# Patient Record
Sex: MALE | Race: BLACK OR AFRICAN AMERICAN | HISPANIC OR LATINO | Employment: OTHER | URBAN - METROPOLITAN AREA
[De-identification: names, ages, dates, MRNs, and addresses within clinical notes are randomized per-mention and may not be internally consistent; named-entity substitution may affect disease eponyms.]

---

## 2017-07-12 ENCOUNTER — HOSPITAL ENCOUNTER (EMERGENCY)
Facility: HOSPITAL | Age: 32
Discharge: HOME | End: 2017-07-12
Admitting: EMERGENCY MEDICINE

## 2017-07-12 ENCOUNTER — APPOINTMENT (EMERGENCY)
Dept: RADIOLOGY | Facility: HOSPITAL | Age: 32
End: 2017-07-12

## 2017-07-12 VITALS
TEMPERATURE: 99 F | OXYGEN SATURATION: 97 % | RESPIRATION RATE: 16 BRPM | SYSTOLIC BLOOD PRESSURE: 121 MMHG | HEART RATE: 85 BPM | DIASTOLIC BLOOD PRESSURE: 60 MMHG

## 2017-07-12 DIAGNOSIS — S89.91XA RIGHT KNEE INJURY, INITIAL ENCOUNTER: Primary | ICD-10-CM

## 2017-07-12 PROCEDURE — 99283 EMERGENCY DEPT VISIT LOW MDM: CPT

## 2017-07-12 PROCEDURE — 73564 X-RAY EXAM KNEE 4 OR MORE: CPT

## 2017-07-12 RX ORDER — IBUPROFEN 600 MG/1
600 TABLET ORAL ONCE
Status: COMPLETED | OUTPATIENT
Start: 2017-07-12 | End: 2017-07-12

## 2017-07-12 RX ADMIN — IBUPROFEN 600 MG: 600 TABLET, FILM COATED ORAL at 20:53

## 2018-04-06 ENCOUNTER — HOSPITAL ENCOUNTER (EMERGENCY)
Facility: HOSPITAL | Age: 33
Discharge: HOME | End: 2018-04-06
Attending: EMERGENCY MEDICINE

## 2018-04-06 VITALS
RESPIRATION RATE: 15 BRPM | OXYGEN SATURATION: 95 % | DIASTOLIC BLOOD PRESSURE: 55 MMHG | WEIGHT: 150 LBS | TEMPERATURE: 98 F | HEART RATE: 56 BPM | SYSTOLIC BLOOD PRESSURE: 105 MMHG

## 2018-04-06 DIAGNOSIS — T78.40XA ALLERGIC REACTION, INITIAL ENCOUNTER: Primary | ICD-10-CM

## 2018-04-06 PROCEDURE — 96372 THER/PROPH/DIAG INJ SC/IM: CPT

## 2018-04-06 PROCEDURE — 96374 THER/PROPH/DIAG INJ IV PUSH: CPT

## 2018-04-06 PROCEDURE — 96375 TX/PRO/DX INJ NEW DRUG ADDON: CPT

## 2018-04-06 PROCEDURE — 99283 EMERGENCY DEPT VISIT LOW MDM: CPT

## 2018-04-06 RX ORDER — EPINEPHRINE 1 MG/ML
0.3 INJECTION, SOLUTION, CONCENTRATE INTRAVENOUS ONCE
Status: COMPLETED | OUTPATIENT
Start: 2018-04-06 | End: 2018-04-06

## 2018-04-06 RX ORDER — DIPHENHYDRAMINE HYDROCHLORIDE 50 MG/ML
12.5 INJECTION INTRAMUSCULAR; INTRAVENOUS ONCE
Status: COMPLETED | OUTPATIENT
Start: 2018-04-06 | End: 2018-04-06

## 2018-04-06 RX ORDER — DEXAMETHASONE SODIUM PHOSPHATE 10 MG/ML
10 INJECTION, SOLUTION INTRAMUSCULAR; INTRAVENOUS ONCE
Status: COMPLETED | OUTPATIENT
Start: 2018-04-06 | End: 2018-04-06

## 2018-04-06 RX ORDER — EPINEPHRINE 0.3 MG/.3ML
0.3 INJECTION SUBCUTANEOUS ONCE
Qty: 0.6 ML | Refills: 0 | Status: SHIPPED | OUTPATIENT
Start: 2018-04-06 | End: 2018-09-06 | Stop reason: SDUPTHER

## 2018-04-06 RX ADMIN — EPINEPHRINE 0.3 MG: 1 INJECTION, SOLUTION, CONCENTRATE INTRAVENOUS at 01:35

## 2018-04-06 RX ADMIN — DEXAMETHASONE SODIUM PHOSPHATE 10 MG: 10 INJECTION, SOLUTION INTRAMUSCULAR; INTRAVENOUS at 01:33

## 2018-04-06 RX ADMIN — FAMOTIDINE 20 MG: 10 INJECTION, SOLUTION INTRAVENOUS at 01:31

## 2018-04-06 RX ADMIN — DIPHENHYDRAMINE HYDROCHLORIDE 12.5 MG: 50 INJECTION, SOLUTION INTRAMUSCULAR; INTRAVENOUS at 01:32

## 2018-04-06 NOTE — DISCHARGE INSTRUCTIONS
Reacción alérgica general   LO QUE NECESITA SABER:   Kenna reacción alérgica es la respuesta de curry cuerpo a un alérgeno  Los alérgenos [employer], alimentos, picaduras de insectos, la caspa de los [place], el moho, el látex, los químicos y los ácaros del polvo  El polen de los [place], [place] césped y las hierbas malas también pueden causar kenna reacción alérgica  INSTRUCCIONES SOBRE EL NATE HOSPITALARIA:   Regrese a la charo de emergencias si:   · Usted tiene un sarpullido, urticaria, inflamación o comezón en la piel que empeora  · Usted tiene dificultad para respirar, falta de aire, sibilancia o tos  · Curry garganta se siente apretada o nenita labios o lengua se inflaman  · Usted tiene dificultad para tragar o hablar  · Usted tiene mareos, sensación de aturdimiento, desmayos o confusión  · Usted tiene náusea, vómito, diarrea o calambres abdominales  · Usted tiene dolor o presión en el pecho  Pregúntele a curry Michael Gambler vitaminas y minerales son adecuados para usted  · Usted tiene preguntas o inquietudes acerca de curry condición o cuidado  Medicamentos:   · Medicamentos,  para aliviar ciertos síntomas de las [place], elijah la comezón, los estornudos y la inflamación  Usted los puede maggy en forma de píldora o de gotas en nenita ojos o nariz  Los tratamientos tópicos pueden darse para aplicarse directamente en curry piel para ayudar a disminuir la comezón o la inflamación  · Cerritos nenita medicamentos elijah se le haya indicado  Consulte con curry médico si usted doug que curry medicamento no le está ayudando o si presenta efectos secundarios  Infórmele si es alérgico a algún medicamento  Mantenga kenna lista actualizada de los [place], las vitaminas y los productos herbales que axel  Incluya los siguientes datos de los medicamentos: cantidad, frecuencia y motivo de administración  Traiga con usted la lista o los envases de la píldoras a nenita citas de seguimiento   Lleve la lista de los medicamentos con usted en mary de kenna emergencia  Acuda a nenita consultas de control con jones médico según le indicaron  Anote nenita preguntas para que se acuerde de hacerlas milena nenita visitas  Cuidados personales:   · Evite el alérgeno  que usted doug haya causado jones reacción alérgica  · Use compresas frías  en la piel o los ojos si fueron afectados por la reacción alérgica  Es probable que las compresas frías ayuden a aliviar jones piel u ojos  · Enjuague nenita pasajes nasales  con kenna solución salina  El enjuague diario puede llegar a ayudar a mantener limpia jones nariz de alérgenos  · No fume  Los síntomas de nenita alergias pueden llegar a disminuir si usted no se expone al humo del cigarrillo  La nicotina y otros químicos de los cigarrillos y cigarros también pueden provocar daño en los pulmones  Pida información a jones médico si usted actualmente fuma y necesita ayuda para dejar de fumar  Los cigarrillos electrónicos o tabaco sin humo todavía contienen nicotina  Consulte con jones médico antes de [employer]  © 2017 [address] [employer] [employer] Information is for End User's use only and may not be sold, redistributed or otherwise used for commercial purposes  All illustrations and images included in CareNotes® are the copyrighted property of A D A M , Inc  or Danny Churchill  Esta información es sólo para uso en educación  Jones intención no es darle un consejo médico sobre enfermedades o tratamientos  Colsulte con jones Alben Bobby farmacéutico antes de seguir cualquier régimen médico para saber si es seguro y efectivo para usted

## 2018-04-06 NOTE — ED PROVIDER NOTES
History  Chief Complaint   Patient presents with    Allergic Reaction     patient reports diffuse hives and itchiness that started today after eating salad and octopus  denies reactions in the past   reports taking 50mg benadryl  denies tongue and throat swelling  reports difficutly breathing  HPI    None       History reviewed  No pertinent past medical history  History reviewed  No pertinent surgical history  History reviewed  No pertinent family history  I have reviewed and agree with the history as documented      Social History   Substance Use Topics    Smoking status: Current Every Day Smoker    Smokeless tobacco: Never Used    Alcohol use No        Review of Systems    Physical Exam  ED Triage Vitals   Temperature Pulse Respirations Blood Pressure SpO2   04/06/18 0051 04/06/18 0051 04/06/18 0051 04/06/18 0051 04/06/18 0051   98 °F (36 7 °C) 101 17 122/74 98 %      Temp Source Heart Rate Source Patient Position - Orthostatic VS BP Location FiO2 (%)   04/06/18 0051 04/06/18 0130 04/06/18 0051 04/06/18 0051 --   Oral Monitor Sitting Right arm       Pain Score       --                  Orthostatic Vital Signs  Vitals:    04/06/18 0051 04/06/18 0130   BP: 122/74 119/73   Pulse: 101 64   Patient Position - Orthostatic VS: Sitting Lying       Physical Exam    ED Medications  Medications   EPINEPHrine PF (ADRENALIN) 1 mg/mL injection 0 3 mg (0 3 mg Intramuscular Given 4/6/18 0135)   diphenhydrAMINE (BENADRYL) injection 12 5 mg (12 5 mg Intravenous Given 4/6/18 0132)   dexamethasone (PF) (DECADRON) injection 10 mg (10 mg Intravenous Given 4/6/18 0133)   famotidine (PEPCID) injection 20 mg (20 mg Intravenous Given 4/6/18 0131)       Diagnostic Studies  Results Reviewed     None                 No orders to display              Procedures  Procedures       Phone Contacts  ED Phone Contact    ED Course  ED Course                                [facility]  CritCare Time    Disposition  Final diagnoses:   None ED Disposition     None      Follow-up Information    None       Patient's Medications    No medications on file     No discharge procedures on file      ED Provider  Electronically Signed by

## 2018-05-08 NOTE — ED PROVIDER NOTES
History  Chief Complaint   Patient presents with    Allergic Reaction     patient reports diffuse hives and itchiness that started today after eating salad and octopus  denies reactions in the past   reports taking 50mg benadryl  denies tongue and throat swelling  reports difficutly breathing  History provided by:  Patient   used: No    Allergic Reaction   Presenting symptoms: difficulty breathing, itching, rash, swelling and wheezing    Presenting symptoms: no difficulty swallowing    Severity:  Moderate  Prior allergic episodes:  No prior episodes  Context: food    Relieved by:  None tried  Worsened by:  Nothing  Ineffective treatments:  None tried      None       History reviewed  No pertinent past medical history  History reviewed  No pertinent surgical history  History reviewed  No pertinent family history  I have reviewed and agree with the history as documented  Social History   Substance Use Topics    Smoking status: Current Every Day Smoker    Smokeless tobacco: Never Used    Alcohol use No        Review of Systems   Constitutional: Negative for activity change, appetite change, diaphoresis, fatigue and fever  HENT: Negative for congestion, rhinorrhea, sore throat and trouble swallowing  Eyes: Negative for pain and visual disturbance  Respiratory: Positive for shortness of breath and wheezing  Negative for cough and chest tightness  Cardiovascular: Negative for chest pain  Gastrointestinal: Negative for abdominal pain, blood in stool, diarrhea, nausea and vomiting  Endocrine: Negative for polyphagia and polyuria  Genitourinary: Negative for dysuria, flank pain, frequency, hematuria and urgency  Musculoskeletal: Negative for back pain, gait problem, neck pain and neck stiffness  Skin: Positive for itching and rash  Negative for color change  Allergic/Immunologic: Negative for immunocompromised state     Neurological: Negative for dizziness, speech difficulty, numbness and headaches  Hematological: Does not bruise/bleed easily  Psychiatric/Behavioral: Negative for confusion and decreased concentration  Physical Exam  ED Triage Vitals   Temperature Pulse Respirations Blood Pressure SpO2   04/06/18 0051 04/06/18 0051 04/06/18 0051 04/06/18 0051 04/06/18 0051   98 °F (36 7 °C) 101 17 122/74 98 %      Temp Source Heart Rate Source Patient Position - Orthostatic VS BP Location FiO2 (%)   04/06/18 0051 04/06/18 0130 04/06/18 0051 04/06/18 0051 --   Oral Monitor Sitting Right arm       Pain Score       --                  Orthostatic Vital Signs  Vitals:    04/06/18 0051 04/06/18 0130 04/06/18 0245   BP: 122/74 119/73 105/55   Pulse: 101 64 56   Patient Position - Orthostatic VS: Sitting Lying Lying       Physical Exam   Constitutional: He is oriented to person, place, and time  He appears well-developed and well-nourished  HENT:   Head: Normocephalic  Lips and tongue appear normal size  Uvula is midline and appears normal    Eyes: Conjunctivae and EOM are normal  Pupils are equal, round, and reactive to light  No scleral icterus  Neck: Normal range of motion  Neck supple  No JVD present  Cardiovascular: Normal rate and regular rhythm  Pulmonary/Chest: Effort normal  No respiratory distress  He has wheezes  Mild scattered wheezes, otherwise normal air movement, no tachypnea or respiratory distress at rest    Abdominal: Soft  Bowel sounds are normal  He exhibits no distension  There is no tenderness  There is no rebound and no guarding  Musculoskeletal: Normal range of motion  He exhibits no tenderness or deformity  Lymphadenopathy:     He has no cervical adenopathy  Neurological: He is alert and oriented to person, place, and time  Coordination normal    Skin: Skin is warm and dry  He is not diaphoretic  Diffuse erythematous, blanching, slightly raised urticarial rash of the chest and back     Psychiatric: He has a normal mood and affect  Vitals reviewed  ED Medications  Medications   EPINEPHrine PF (ADRENALIN) 1 mg/mL injection 0 3 mg (0 3 mg Intramuscular Given 4/6/18 0135)   diphenhydrAMINE (BENADRYL) injection 12 5 mg (12 5 mg Intravenous Given 4/6/18 0132)   dexamethasone (PF) (DECADRON) injection 10 mg (10 mg Intravenous Given 4/6/18 0133)   famotidine (PEPCID) injection 20 mg (20 mg Intravenous Given 4/6/18 0131)       Diagnostic Studies  Results Reviewed     None                 No orders to display              Procedures  Procedures       Phone Contacts  ED Phone Contact    ED Course                               MDM  Number of Diagnoses or Management Options  Allergic reaction, initial encounter: new and requires workup  Diagnosis management comments: 50-year-old, otherwise healthy male presented for evaluation after an apparent allergic reaction that occurred after eating a seafood salad including octopus  Patient states that he has had similar food in the past but that he has not eaten octopus in quite some time  Very shortly after a eating the patient began to developed an itching sensation throughout his body as well as a sensation of throat swelling and difficulty breathing  Patient does report similar reactions in the past although he thinks they were less severe in the past   He has not had a similar reaction in over a year  He does not know of any known food or medication allergies  Patient was noted to have a diffuse urticarial rash  He was not in acute respiratory distress at rest but did have bilateral wheezes on exam   He was treated as described and had excellent relief of his symptoms  He was re-evaluated prior to discharge and his rash was much improved  He had no more wheezing  He will be discharged to follow up with his primary care physician  We discussed the possible utility of allergy testing and future avoidance of triggers    We also discussed return precautions for anyNew or worsening symptoms  Amount and/or Complexity of Data Reviewed  Review and summarize past medical records: yes      CritCare Time    Disposition  Final diagnoses: Allergic reaction, initial encounter     Time reflects when diagnosis was documented in both MDM as applicable and the Disposition within this note     Time User Action Codes Description Comment    4/6/2018  2:31 AM Armando Richards Add [[id number] [id number]] Allergic reaction, initial encounter       ED Disposition     ED Disposition Condition Comment    Discharge  [address] discharge to home/self care  Condition at discharge: Good        Follow-up Information     Follow up With Specialties Details Why [address] [facility]  Please follow-up in 2 to 3 days  If you have new or worsening symptoms please call your doctor or return to the emergency department  [address] [place] [id number] [phone]        Discharge Medication List as of 4/6/2018  2:32 AM      START taking these medications    Details   EPINEPHrine (EPIPEN) 0 3 mg/0 3 mL SOAJ Inject 0 3 mL (0 3 mg total) into the shoulder, thigh, or buttocks once for 1 dose, Starting Fri 4/6/2018, Print           No discharge procedures on file      ED Provider  Electronically Signed by           Aziza Palmer MD  05/08/18 8291

## 2018-09-06 ENCOUNTER — OFFICE VISIT (OUTPATIENT)
Dept: FAMILY MEDICINE CLINIC | Facility: CLINIC | Age: 33
End: 2018-09-06
Payer: COMMERCIAL

## 2018-09-06 VITALS
DIASTOLIC BLOOD PRESSURE: 70 MMHG | HEART RATE: 80 BPM | RESPIRATION RATE: 16 BRPM | WEIGHT: 165 LBS | BODY MASS INDEX: 22.35 KG/M2 | SYSTOLIC BLOOD PRESSURE: 120 MMHG | OXYGEN SATURATION: 98 % | TEMPERATURE: 97.4 F | HEIGHT: 72 IN

## 2018-09-06 DIAGNOSIS — Z23 NEED FOR VACCINATION: ICD-10-CM

## 2018-09-06 DIAGNOSIS — Z91.013 ALLERGY TO SHELLFISH: ICD-10-CM

## 2018-09-06 DIAGNOSIS — T78.40XA ALLERGIC REACTION, INITIAL ENCOUNTER: ICD-10-CM

## 2018-09-06 DIAGNOSIS — Z00.00 PHYSICAL EXAM: Primary | ICD-10-CM

## 2018-09-06 DIAGNOSIS — M25.561 ACUTE PAIN OF RIGHT KNEE: ICD-10-CM

## 2018-09-06 PROCEDURE — 90715 TDAP VACCINE 7 YRS/> IM: CPT | Performed by: FAMILY MEDICINE

## 2018-09-06 PROCEDURE — 90732 PPSV23 VACC 2 YRS+ SUBQ/IM: CPT | Performed by: FAMILY MEDICINE

## 2018-09-06 PROCEDURE — 3008F BODY MASS INDEX DOCD: CPT | Performed by: FAMILY MEDICINE

## 2018-09-06 PROCEDURE — 99214 OFFICE O/P EST MOD 30 MIN: CPT | Performed by: FAMILY MEDICINE

## 2018-09-06 PROCEDURE — 90472 IMMUNIZATION ADMIN EACH ADD: CPT | Performed by: FAMILY MEDICINE

## 2018-09-06 PROCEDURE — 90471 IMMUNIZATION ADMIN: CPT | Performed by: FAMILY MEDICINE

## 2018-09-06 RX ORDER — PREDNISONE 20 MG/1
20 TABLET ORAL ONCE
Qty: 2 TABLET | Refills: 0 | Status: SHIPPED | OUTPATIENT
Start: 2018-09-06 | End: 2018-09-06

## 2018-09-06 RX ORDER — EPINEPHRINE 0.3 MG/.3ML
0.3 INJECTION SUBCUTANEOUS ONCE
Qty: 0.6 ML | Refills: 0 | Status: SHIPPED | OUTPATIENT
Start: 2018-09-06 | End: 2019-05-28

## 2018-09-06 RX ORDER — DIPHENHYDRAMINE HCL 25 MG
25 TABLET ORAL EVERY 6 HOURS PRN
Qty: 30 TABLET | Refills: 0 | Status: SHIPPED | OUTPATIENT
Start: 2018-09-06 | End: 2019-05-28

## 2018-09-06 RX ORDER — IBUPROFEN 600 MG/1
600 TABLET ORAL 2 TIMES DAILY WITH MEALS
Qty: 30 TABLET | Refills: 0 | Status: SHIPPED | OUTPATIENT
Start: 2018-09-06 | End: 2019-05-28

## 2018-09-06 RX ORDER — EPINEPHRINE 0.3 MG/.3ML
0.3 INJECTION SUBCUTANEOUS ONCE
Qty: 0.3 ML | Refills: 0 | Status: SHIPPED | OUTPATIENT
Start: 2018-09-06 | End: 2019-05-28

## 2018-09-06 NOTE — ASSESSMENT & PLAN NOTE
Follow up MRI to r/u meniscal tear  XR of 7/2018 r/u fx but was positive for effusion  PE positive for lateral meniscus tenderness  Motrin, 600 mg, BID, PRN for pain management  Follow up with results

## 2018-09-07 NOTE — PROGRESS NOTES
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 as subsequent hospitalization  Pt has been advised to keep one Epi-pen at home and in his car, along with 50 mg Benadryl and 40 mg Predinsone if pt has concern of possible exposure  Referred to Allergist for further workup  Diagnoses and all orders for this visit:    Physical exam  -     Cancel: Visual acuity screening    Need for vaccination  -     TDAP VACCINE GREATER THAN OR EQUAL TO 6YO IM  -     PNEUMOCOCCAL POLYSACCHARIDE VACCINE 23-VALENT =>1YO SQ IM    Acute pain of right knee  -     MRI knee right  wo contrast; Future  -     ibuprofen (MOTRIN) 600 mg tablet; Take 1 tablet (600 mg total) by mouth 2 (two) times a day with meals    Allergy to shellfish  -     Ambulatory referral to Allergy; Future  -     predniSONE 20 mg tablet; Take 1 tablet (20 mg total) by mouth once for 1 dose  -     diphenhydrAMINE (BENADRYL) 25 mg tablet; Take 1 tablet (25 mg total) by mouth every 6 (six) hours as needed for itching (EXPOSURE TO SHELLFISH)  -     EPINEPHrine (EPIPEN) 0 3 mg/0 3 mL SOAJ; Inject 0 3 mL (0 3 mg total) into a muscle once for 1 dose    Allergic reaction, initial encounter  -     EPINEPHrine (EPIPEN) 0 3 mg/0 3 mL SOAJ; Inject 0 3 mL (0 3 mg total) into a muscle once for 1 dose          Subjective:      Patient ID: Prashant Gamboa is a 28 y o  male  Prashant Gamboa is a 28 y o  Male, presenting to clinic to establish care and complete 's license physical  Pt has no major complaints and denies significant past medical history or current medication usage  Pt current complaint is persistent r  Knee pain, post injury sustained 7/2018 while pt was playing basket ball  Pt states that he landed on his feet and inverted his knee after a jump  Pt was seen in the ER, where an XR was completed and came back positive for joint effusion but negative for fx  Pt currently treats his knee with heat when there is pain  Pt states there is point tenderness on the lateral inferior aspect of the knee, and he experiences a sensation of his knee "giving out" when walking  He describes his pain as 3 to 4/10  The following portions of the patient's history were reviewed and updated as appropriate: He  has no past medical history on file  Patient Active Problem List    Diagnosis Date Noted    Acute pain of right knee 09/06/2018    Allergy to shellfish 09/06/2018     He  has no past surgical history on file  His family history is not on file  He  reports that he has been smoking  He has never used smokeless tobacco  He reports that he drinks alcohol  He reports that he uses drugs, including Marijuana, about 3 times per week  Current Outpatient Prescriptions   Medication Sig Dispense Refill    diphenhydrAMINE (BENADRYL) 25 mg tablet Take 1 tablet (25 mg total) by mouth every 6 (six) hours as needed for itching (EXPOSURE TO SHELLFISH) 30 tablet 0    EPINEPHrine (EPIPEN) 0 3 mg/0 3 mL SOAJ Inject 0 3 mL (0 3 mg total) into a muscle once for 1 dose 0 3 mL 0    EPINEPHrine (EPIPEN) 0 3 mg/0 3 mL SOAJ Inject 0 3 mL (0 3 mg total) into a muscle once for 1 dose 0 6 mL 0    ibuprofen (MOTRIN) 600 mg tablet Take 1 tablet (600 mg total) by mouth 2 (two) times a day with meals 30 tablet 0    predniSONE 20 mg tablet Take 1 tablet (20 mg total) by mouth once for 1 dose 2 tablet 0     No current facility-administered medications for this visit        Current Outpatient Prescriptions on File Prior to Visit   Medication Sig    [DISCONTINUED] EPINEPHrine (EPIPEN) 0 3 mg/0 3 mL SOAJ Inject 0 3 mL (0 3 mg total) into the shoulder, thigh, or buttocks once for 1 dose     No current facility-administered medications on file prior to visit  He has No Known Allergies       Review of Systems   Constitutional: Positive for activity change  Eyes: Negative for visual disturbance  Respiratory: Negative  Cardiovascular: Negative  Gastrointestinal: Negative  Musculoskeletal: Positive for joint swelling  Negative for gait problem and myalgias  Allergic/Immunologic: Positive for food allergies  Neurological: Negative for seizures, weakness and numbness  Objective:      /70 (BP Location: Left arm, Patient Position: Sitting, Cuff Size: Adult)   Pulse 80   Temp (!) 97 4 °F (36 3 °C) (Tympanic)   Resp 16   Ht 6' (1 829 m)   Wt 74 8 kg (165 lb)   SpO2 98%   BMI 22 38 kg/m²          Physical Exam   Constitutional: He appears well-developed and well-nourished  Cardiovascular: Normal rate, regular rhythm and normal heart sounds  Pulmonary/Chest: Effort normal and breath sounds normal  He has no wheezes  Musculoskeletal: Normal range of motion  He exhibits tenderness  He exhibits no edema or deformity  Right knee: He exhibits effusion and abnormal meniscus  He exhibits normal range of motion, no swelling, no deformity and normal patellar mobility

## 2018-09-07 NOTE — ASSESSMENT & PLAN NOTE
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

## 2018-09-14 ENCOUNTER — TELEPHONE (OUTPATIENT)
Dept: FAMILY MEDICINE CLINIC | Facility: CLINIC | Age: 33
End: 2018-09-14

## 2018-09-14 NOTE — TELEPHONE ENCOUNTER
The Dr order a MRI right  KNEE  the physician reviewer states the clinical informations received does  not support the requirements of medical necessity guide lines for this procedure  Request for additional clinical or peer to peer discussion  Must call [phone] the tracking # [id number] attending physician  is Dr Nena Grace and the ordering provider is Dr Sara Mtz

## 2018-10-01 ENCOUNTER — HOSPITAL ENCOUNTER (EMERGENCY)
Facility: HOSPITAL | Age: 33
Discharge: HOME | End: 2018-10-01
Payer: COMMERCIAL

## 2018-10-01 VITALS
OXYGEN SATURATION: 98 % | BODY MASS INDEX: 22.37 KG/M2 | HEART RATE: 91 BPM | SYSTOLIC BLOOD PRESSURE: 135 MMHG | TEMPERATURE: 98.6 F | DIASTOLIC BLOOD PRESSURE: 55 MMHG | WEIGHT: 164.9 LBS | RESPIRATION RATE: 16 BRPM

## 2018-10-01 DIAGNOSIS — M25.561 RIGHT KNEE PAIN: Primary | ICD-10-CM

## 2018-10-01 PROCEDURE — 99283 EMERGENCY DEPT VISIT LOW MDM: CPT

## 2018-10-01 RX ORDER — LIDOCAINE 50 MG/G
1 PATCH TOPICAL DAILY
Qty: 10 PATCH | Refills: 0 | Status: SHIPPED | OUTPATIENT
Start: 2018-10-01 | End: 2019-05-28

## 2018-10-01 NOTE — ED PROVIDER NOTES
History  Chief Complaint   Patient presents with    Knee Pain     c/o right knee pain that got worse yesterday pt states he woke up today and felt his knee buckle  ptwas seen for knee and was told to see specialist however insurance won't cover visit  pt taking ibuprofen with no relief  28year old male presents today complaining of right knee pain for the past few months  Worse this AM  Denies new injuries  Takes motrin for pain with relief  Denies fevers, swelling or redness  Reports history of same  Has seen his PCP and was given Rx for an MRI however it was denied by his insurance  Pt says that his PCP recommended physical therapy however he did not schedule any sessions  Prior to Admission Medications   Prescriptions Last Dose Informant Patient Reported? Taking? EPINEPHrine (EPIPEN) 0 3 mg/0 3 mL SOAJ   No No   Sig: Inject 0 3 mL (0 3 mg total) into a muscle once for 1 dose   EPINEPHrine (EPIPEN) 0 3 mg/0 3 mL SOAJ   No No   Sig: Inject 0 3 mL (0 3 mg total) into a muscle once for 1 dose   diphenhydrAMINE (BENADRYL) 25 mg tablet   No No   Sig: Take 1 tablet (25 mg total) by mouth every 6 (six) hours as needed for itching (EXPOSURE TO SHELLFISH)   ibuprofen (MOTRIN) 600 mg tablet   No No   Sig: Take 1 tablet (600 mg total) by mouth 2 (two) times a day with meals      Facility-Administered Medications: None       History reviewed  No pertinent past medical history  History reviewed  No pertinent surgical history  History reviewed  No pertinent family history  I have reviewed and agree with the history as documented  Social History   Substance Use Topics    Smoking status: Current Some Day Smoker    Smokeless tobacco: Never Used      Comment: about 2 cigarettes max every couple of days    Alcohol use Yes      Comment: occassional        Review of Systems   Musculoskeletal: Positive for arthralgias  All other systems reviewed and are negative        Physical Exam  Physical Exam Constitutional: He appears well-developed and well-nourished  No distress  Cardiovascular: Normal rate  Pulmonary/Chest: No respiratory distress  Musculoskeletal:        Right knee: He exhibits normal range of motion, no swelling, no effusion, no ecchymosis, no deformity, no laceration and no erythema  No tenderness found  No medial joint line and no lateral joint line tenderness noted  Neurological: He is alert  No sensory deficit  Skin: Skin is warm and dry  Capillary refill takes less than 2 seconds  He is not diaphoretic  No erythema  Vital Signs  ED Triage Vitals   Temperature Pulse Respirations Blood Pressure SpO2   10/01/18 1310 10/01/18 1310 10/01/18 1310 10/01/18 1311 10/01/18 1310   98 6 °F (37 °C) 91 16 135/55 98 %      Temp Source Heart Rate Source Patient Position - Orthostatic VS BP Location FiO2 (%)   10/01/18 1310 10/01/18 1310 10/01/18 1311 10/01/18 1311 --   Temporal Monitor Sitting Right arm       Pain Score       10/01/18 1310       8           Vitals:    10/01/18 1310 10/01/18 1311   BP:  135/55   Pulse: 91    Patient Position - Orthostatic VS:  Sitting       Visual Acuity      ED Medications  Medications - No data to display    Diagnostic Studies  Results Reviewed     None                 No orders to display              Procedures  Procedures       Phone Contacts  ED Phone Contact    ED Course                               MDM  CritCare Time    Disposition  Final diagnoses:   Right knee pain     Time reflects when diagnosis was documented in both MDM as applicable and the Disposition within this note     Time User Action Codes Description Comment    10/1/2018  3:19 PM Young, [address] [facility] Right knee pain       ED Disposition     ED Disposition Condition Comment    Discharge  [address] discharge to home/self care      Condition at discharge: Good        Follow-up Information     Follow up With Specialties Details Why Contact Rosanna Conway MD Family Medicine Schedule an appointment as soon as possible for a visit  [address] [facility] [phone]      [address] Orthopedic Surgery Schedule an appointment as soon as possible for a visit  [place] [id number] [phone]          Discharge Medication List as of 10/1/2018  3:22 PM      START taking these medications    Details   lidocaine (LIDODERM) 5 % Apply 1 patch topically daily Remove & Discard patch within 12 hours or as directed by MD, Starting Mon 10/1/2018, Print         CONTINUE these medications which have NOT CHANGED    Details   diphenhydrAMINE (BENADRYL) 25 mg tablet Take 1 tablet (25 mg total) by mouth every 6 (six) hours as needed for itching (EXPOSURE TO SHELLFISH), Starting Thu 9/6/2018, Normal      EPINEPHrine (EPIPEN) 0 3 mg/0 3 mL SOAJ Inject 0 3 mL (0 3 mg total) into a muscle once for 1 dose, Starting Thu 9/6/2018, Normal      EPINEPHrine (EPIPEN) 0 3 mg/0 3 mL SOAJ Inject 0 3 mL (0 3 mg total) into a muscle once for 1 dose, Starting Thu 9/6/2018, Normal      ibuprofen (MOTRIN) 600 mg tablet Take 1 tablet (600 mg total) by mouth 2 (two) times a day with meals, Starting Thu 9/6/2018, Normal           No discharge procedures on file      ED Provider  Electronically Signed by           Ellen Kate PA-C  10/01/18 2009

## 2018-10-01 NOTE — DISCHARGE INSTRUCTIONS
Dolor de rodilla   LO QUE NECESITA SABER:   El dolor de rodilla puede empezar de forma repentina, o ser un problema a Chandler Isrrael  Puede sentir dolor en la parte lateral, delantera o trasera de la rodilla  Puede tener la rodilla rígida e hinchada  Puede oír sonidos de chasquido o sentir que la rodilla cede o se le bloquea al andar  Puede sentir dolor cuando se sienta, se pone de pie, camina o sube y baja escaleras  El dolor de rodilla puede estar provocado por condiciones elijah obesidad, inflamación, esguinces o desgarros de los ligamentos o los tendones  INSTRUCCIONES SOBRE EL NATE HOSPITALARIA:   Debbi Mcclelland en 24 horas a kenna tran de seguimiento con curry médico o elijah se le indique:  Yani Rose necesite tratamientos de seguimiento, elijah inyecciones de esteroides para reducir el dolor  Anote nenita preguntas para que se acuerde de hacerlas milena nenita visitas  Cuidados personales:   · Descanse  la rodilla para que se pueda curar  Limite las actividades que aumenten el dolor  · El hielo  puede reducir la inflamación  Envuelva hielo en kenna isiah y Maribeth que le recomienden y con la frecuencia que le indiquen  · La compresión  con kenna [place] o kenna venda puede ayudar a reducir la hinchazón  Use kenna férula o venda solamente si sigue las instrucciones del mary  · La elevación  ayuda a calmar el dolor y bajar la inflamación  Eleve la rodilla mientras esté sentado o acostado  Apoye la pierna sobre almohadones para mantener la rodilla por encima del corazón  Medicamentos:   · AINEs (Analgésicos antiinflamatorios no esteroides)  pueden disminuir la inflamación y el dolor o la fiebre  Tia medicamento esta disponible con o sin kenna receta médica  Los AINEs pueden causar sangrado estomacal o problemas renales en ciertas personas  Si usted axel un medicamento anticoagulante, siempre pregúntele a curry médico si los DARA son seguros para usted   Siempre joe la etiqueta de tia medicamento y [address] [facility]  · El acetaminofén  Kissousa el dolor y baja la fiebre  Está disponible sin receta médica  Pregunte cuánto maggy y cuándo  [place] [id number]  El acetaminofén puede causar daño en el hígado cuando no se axel de forma correcta  · [place] nenita medicamentos elijah se le haya indicado  Consulte con jones médico si usted doug que jones medicamento no le está ayudando o si presenta efectos secundarios  Infórmele si es alérgico a cualquier medicamento  Mantenga kenna lista actualizada de los [employer], las vitaminas y los productos herbales que axel  Incluya los siguientes datos de los medicamentos: cantidad, frecuencia y motivo de administración  Traiga con usted la lista o los envases de la píldoras a nenita citas de seguimiento  Lleve la lista de los medicamentos con usted en mary de kenna emergencia  Ejercítese según indicaciones:  Es posible que deba consultar con un fisioterapeuta o hacer los ejercicios que le recomienden para mejorar la movilidad y reducir el dolor  Dhruv Jesus le aconsejen que camine, nade o monte en bicicleta  Siga jones plan de ejercicios exactamente del modo que le castellanos indicado para evitar más lesiones  Pregúntele a jones [place] vitaminas y minerales son adecuados para usted  · Usted tiene preguntas o inquietudes acerca de jones condición o cuidado  Regrese a la charo de emergencias si:   · El dolor empeora, [address]  · No puede flexionar o enderezar la pierna completamente  · La hinchazón alrededor de la rodilla no disminuye a pesar del tratamiento  · La rodilla le duele y se nota caliente al tacto  © 2017 [address] Information is for End User's use only and may not be sold, redistributed or otherwise used for commercial purposes  All illustrations and images included in CareNotes® are the copyrighted property of A D A M , Inc  or Danny Churchill  Esta información es sólo para uso en educación   Jones intención no es darle un consejo [employer] o tratamientos  Colsulte con curry [facility] [place] farmacéutico antes de seguir cualquier régimen médico para saber si es seguro y efectivo para usted

## 2018-10-09 ENCOUNTER — TELEPHONE (OUTPATIENT)
Dept: FAMILY MEDICINE CLINIC | Facility: CLINIC | Age: 33
End: 2018-10-09

## 2018-10-09 DIAGNOSIS — M25.561 ACUTE PAIN OF RIGHT KNEE: Primary | ICD-10-CM

## 2018-10-09 NOTE — TELEPHONE ENCOUNTER
Spoke w/ pt using ; pt has been ambulating with minimal pain, and uses Ibuprofen at night  Pt still complains of stabbing pain of the r  Knee w/ sensation of knee "giving in" to weight  MRI of r  Knee was denied- require pt have a trial of NSAIDs and PT  Referral for PT has been completed     Follow up with pt to evaluate pain and progress

## 2018-10-19 ENCOUNTER — EVALUATION (OUTPATIENT)
Dept: PHYSICAL THERAPY | Facility: CLINIC | Age: 33
End: 2018-10-19
Payer: COMMERCIAL

## 2018-10-19 DIAGNOSIS — G89.29 CHRONIC PAIN OF RIGHT KNEE: Primary | ICD-10-CM

## 2018-10-19 DIAGNOSIS — M25.561 CHRONIC PAIN OF RIGHT KNEE: Primary | ICD-10-CM

## 2018-10-19 PROCEDURE — G8991 OTHER PT/OT GOAL STATUS: HCPCS | Performed by: PHYSICAL THERAPIST

## 2018-10-19 PROCEDURE — 97161 PT EVAL LOW COMPLEX 20 MIN: CPT | Performed by: PHYSICAL THERAPIST

## 2018-10-19 PROCEDURE — G8990 OTHER PT/OT CURRENT STATUS: HCPCS | Performed by: PHYSICAL THERAPIST

## 2018-10-19 NOTE — PROGRESS NOTES
PT Evaluation     Today's date: 10/19/2018  Patient name: Yifan Guzmán  : 1985  MRN: [id number]  Referring provider: Leigha Mendez MD  Dx:   Encounter Diagnosis     ICD-10-CM    1  Chronic pain of right knee M25 561     G89 29                   Assessment  Impairments: abnormal gait, abnormal or restricted ROM, activity intolerance, impaired balance, impaired physical strength and lacks appropriate home exercise program    Assessment details: Pt is a 28year old male presenting to PT with one year history of right knee pain  Pt presents with deficits in right knee range of motion, right hip and knee strength, bilateral lower extremity flexibility, balance and gait  Pt with trace effusion, medial joint line tenderness and clinical exam positive for possible ACL and medial meniscal pathology  Pt is a good candidate for skilled PT intervention and will benefit from treatment in order to address his limitations and to restore maximal function  Understanding of Dx/Px/POC: good   Prognosis: good    Goals  Short Term Goals: To be achieved by 4 weeks    1) Patient to be independent with basic HEP  2) Decrease pain to 5/10 at worst   3) Increase ROM by 5 degrees or greater in all deficient planes  4) Increase strength by 1/2 MMT grade or greater in all deficient planes  Long Term Goals: To be achieved by discharge    1) FOTO equal to or greater than 66   2) Patient to be independent with comprehensive HEP  3) Decrease pain to 2/10 at worst  for improved quality of life  4) Increase strength to 5/5 MMT grade in all deficient planes to improve a/iadls  5) Increase ROM to within normal limits  6) Patient will regain normalized gait pattern         Plan  Patient would benefit from: skilled physical therapy  Other planned modality interventions: PRN  Planned therapy interventions: manual therapy, patient education, therapeutic exercise and home exercise program  Frequency: 2x week  Duration in visits: 12  Duration in weeks: 6  Treatment plan discussed with: patient        Subjective Evaluation    History of Present Illness  Date of onset: 7/11/2017  Mechanism of injury: Pt is a 28year old male presenting to PT with one year history of right knee pain  Pt reports he injured his right knee playing basketball sometime in 2017  ER records indicated this injury occurred on/about 7/11/2017  X-ray performed with the following results:   No acute osseous abnormality  Joint effusion  Pt reports he continued with pain and limitation with his right knee over the past year and went to PCP for further evaluation  Pt referred to OPPT  Pt reports he is currently unemployed, he previously was working as a   Pain Location: right knee     Pain Type: sharp    Pain Intensity:  Current: 0  Best: 0  Worst: 8      Pt reports increased pain and/or difficulty with: bending the knee, lifting, squatting, extended walking or standing, ascending/descending stairs; pt reports occasional sleep disturbance secondary to knee pain  Pt reports decreased pain with: Lidocaine, rest     Recurrent probem    Quality of life: good    Treatments  No previous or current treatments  Patient Goals  Patient goals for therapy: decreased pain, improved balance, return to sport/leisure activities, independence with ADLs/IADLs and increased strength          Objective     Observations     Additional Observation Details  Effusion: Trace    Flexibility: moderate R > L tightness of bilateral hamstrings, iliopsoas    SLS: R = 12 seconds;  30+ seconds    Gait: mildly antalgic with right toe out, decreased right stance time    Double Leg Squat: right knee valgus, right hip adduction    Single Leg Squat:  L = WNL; R = right knee valgus, right hip adduction    Tenderness     Right Knee   Tenderness in the medial joint line  No tenderness in the MCL (distal) and MCL (proximal)       Neurological Testing Sensation     Knee   Left Knee   Intact: light touch    Right Knee   Intact: light touch     Additional Neurological Details  Pt denies numbness/tingling in B LE  Active Range of Motion   Left Knee   Flexion: 145 degrees   Extension: 0 degrees     Right Knee   Flexion: 120 degrees   Extension: 0 degrees     Strength/Myotome Testing     Left Hip   Planes of Motion   Flexion: 5  Extension: 5  Abduction: 5  Adduction: 5 and WFL    Right Hip   Planes of Motion   Flexion: 4-  Extension: 3+  Abduction: 3+  Adduction: 3+    Tests     Left Knee   Negative anterior drawer and anterior Lachman  Right Knee   Positive anterior drawer, anterior Lachman, medial Ange, Thessaly's test at 20 degrees and valgus stress test at 30 degrees  Negative valgus stress test at 0 degrees, varus stress test at 0 degrees and varus stress test at 30 degrees         Flowsheet Rows      Most Recent Value   PT/OT G-Codes   Current Score  48   Projected Score  66   FOTO information reviewed  Yes   Assessment Type  Evaluation   G code set  Other PT/OT Primary   Other PT Primary Current Status ()  CK   Other PT Primary Goal Status ()  CJ          Precautions: none    Daily Treatment Diary       Manuals             Stretching for HS and HF                                                    Exercise Diary              Bike             HS Stretch             HF Stretch             SLR x 4             SL Bridge             Mini-Squats             SLS on Airex             Forward Step-Ups             Lateral Step-Ups                                                                                                                                                                                      Modalities             CP R Knee  Post-Tx

## 2018-10-22 ENCOUNTER — OFFICE VISIT (OUTPATIENT)
Dept: PHYSICAL THERAPY | Facility: CLINIC | Age: 33
End: 2018-10-22
Payer: COMMERCIAL

## 2018-10-22 DIAGNOSIS — G89.29 CHRONIC PAIN OF RIGHT KNEE: Primary | ICD-10-CM

## 2018-10-22 DIAGNOSIS — M25.561 CHRONIC PAIN OF RIGHT KNEE: Primary | ICD-10-CM

## 2018-10-22 PROCEDURE — 97110 THERAPEUTIC EXERCISES: CPT

## 2018-10-22 PROCEDURE — 97112 NEUROMUSCULAR REEDUCATION: CPT

## 2018-10-22 NOTE — PROGRESS NOTES
Daily Note     Today's date: 10/22/2018  Patient name: Lelo Cartagena  : 1985  MRN: [id number]  Referring provider: Yvonne Sutton MD  Dx:   Encounter Diagnosis     ICD-10-CM    1  Chronic pain of right knee M25 561     G89 29        Start Time: 0430  Stop Time: 0505  Total time in clinic (min): 35 minutes    Subjective: Patient noted he feels pretty good today with no changes from last session  Objective: See treatment diary below      Precautions: none    Daily Treatment Diary       Manuals 10/22            Stretching for HS and HF np                                                   Exercise Diary  10/22            Bike 5 mins            HS Stretch 3 x 30"            HF Stretch 3 x 30"            SLR x 4 x20 ea on R LE            SL Bridge nv            Mini-Squats x20            SLS on Airex nv            Forward Step-Ups x20            Lateral Step-Ups x20 ea no HHA                                                                                                                                                                                     Modalities 10/22            CP R Knee  Post-Tx np                               Assessment: Patient performed various hip and knee strengthening exercises to assist c pain  Patient noted some pain with hip abduction exercise, however with rest, the pain subsided  Patient tends to hyperextend the knees during step ups 2* decreased stability in the knee  Patient would benefit from continued PT to allow the patient to return to his PLOF  Plan: Continue per plan of care

## 2018-10-25 ENCOUNTER — OFFICE VISIT (OUTPATIENT)
Dept: PHYSICAL THERAPY | Facility: CLINIC | Age: 33
End: 2018-10-25
Payer: COMMERCIAL

## 2018-10-25 DIAGNOSIS — M25.561 CHRONIC PAIN OF RIGHT KNEE: Primary | ICD-10-CM

## 2018-10-25 DIAGNOSIS — G89.29 CHRONIC PAIN OF RIGHT KNEE: Primary | ICD-10-CM

## 2018-10-25 PROCEDURE — 97110 THERAPEUTIC EXERCISES: CPT

## 2018-10-25 NOTE — PROGRESS NOTES
Daily Note     Today's date: 10/25/2018  Patient name: Ebony Turk  : 1985  MRN: [id number]  Referring provider: Gm Rodriguez MD  Dx:   Encounter Diagnosis     ICD-10-CM    1  Chronic pain of right knee M25 561     G89 29                   Subjective: Pt denies R knee pain upon arrival     Objective: See treatment diary below    Precautions: none    Daily Treatment Diary   Manuals 10/22 10/25           Stretching for HS and HF np [facility]                                                  Exercise Diary  10/22 10/25           Bike 5 mins 6 mins           HS Stretch 3 x 30" Active 90/90  30"x3 ea           HF Stretch 3 x 30"            SLR x 4 x20 ea on R LE 20x R           SL Bridge nv 15x R           Mini-Squats x20 20x           SLS on Airex nv 20"x5           Forward Step-Ups x20 2 risers 20x R           Lateral Step-Ups x20 ea no HHA 2 risers  20x ea           Single leg HR  R 20x                                                                                                                                                                       Modalities 10/22 10/25           CP R Knee  Post-Tx                          *Supervised by VERO COOL from [phone]    Assessment: Tolerated treatment well  Patient demonstrated fatigue post treatment, exhibited good technique with therapeutic exercises and would benefit from continued PT to address continued pain in lateral R knee  Plan: Continue per plan of care  Progress treatment as tolerated        Santos Pardo PTA

## 2018-10-29 ENCOUNTER — OFFICE VISIT (OUTPATIENT)
Dept: PHYSICAL THERAPY | Facility: CLINIC | Age: 33
End: 2018-10-29
Payer: COMMERCIAL

## 2018-10-29 DIAGNOSIS — G89.29 CHRONIC PAIN OF RIGHT KNEE: Primary | ICD-10-CM

## 2018-10-29 DIAGNOSIS — M25.561 CHRONIC PAIN OF RIGHT KNEE: Primary | ICD-10-CM

## 2018-10-29 PROCEDURE — 97140 MANUAL THERAPY 1/> REGIONS: CPT

## 2018-10-29 PROCEDURE — 97112 NEUROMUSCULAR REEDUCATION: CPT

## 2018-10-29 PROCEDURE — 97110 THERAPEUTIC EXERCISES: CPT

## 2018-10-29 NOTE — PROGRESS NOTES
Daily Note     Today's date: 10/29/2018  Patient name: Ruy Cornelius  : 1985  MRN: [id number]  Referring provider: Pallavi Nash MD  Dx:   Encounter Diagnosis     ICD-10-CM    1  Chronic pain of right knee M25 561     G89 29          Subjective: Patient noted pain in LB 10/10 applied patch to his back helped a little bit no complaints of R knee pain currently  Objective: See treatment diary below      Assessment: Tolerated treatment well  Did not progress exercises today due to patient's LBP   Patient exhibited good technique with therapeutic exercises and would benefit from continued PT      Plan: Continue per plan of care          Precautions: none     Daily Treatment Diary   Manuals 10/22 10/25 10/29                 Stretching for HS and HF np [facility]  AF                                                                                         Exercise Diary  10/22 10/25  10/29                 Bike 5 mins 6 mins  6 min                 HS Stretch 3 x 30" Active 90/90  30"x3 ea Active 90/90  30"x3 ea                  HF Stretch 3 x 30"                     SLR x 4 x20 ea on R LE 20x R 20x R                  SL Bridge nv 15x R  15xR                 Mini-Squats x20 20x 20x                   SLS on Airex nv 20"x5 20"x5                  Forward Step-Ups x20 2 risers 20x R 2 risers 20x R                  Lateral Step-Ups x20 ea no HHA 2 risers  20x ea 2 risers  20x ea                  Single leg HR   R 20x R 20x                                                                                                                                                                                                                                                                                                                  Modalities 10/22 10/25                   CP R Knee  Post-Tx

## 2018-11-01 ENCOUNTER — OFFICE VISIT (OUTPATIENT)
Dept: PHYSICAL THERAPY | Facility: CLINIC | Age: 33
End: 2018-11-01
Payer: COMMERCIAL

## 2018-11-01 DIAGNOSIS — M25.561 CHRONIC PAIN OF RIGHT KNEE: Primary | ICD-10-CM

## 2018-11-01 DIAGNOSIS — G89.29 CHRONIC PAIN OF RIGHT KNEE: Primary | ICD-10-CM

## 2018-11-01 PROCEDURE — 97112 NEUROMUSCULAR REEDUCATION: CPT

## 2018-11-01 PROCEDURE — 97110 THERAPEUTIC EXERCISES: CPT

## 2018-11-01 PROCEDURE — 97140 MANUAL THERAPY 1/> REGIONS: CPT

## 2018-11-01 NOTE — PROGRESS NOTES
Daily Note     Today's date: 2018  Patient name: Rosales Schmidt  : 1985  MRN: [id number]  Referring provider: Mica Kan MD  Dx:   Encounter Diagnosis     ICD-10-CM    1  Chronic pain of right knee M25 561     G89 29                   Subjective: Pt reports that since his return to work, he has had a significant increase in pain from walking, up to a "9/10" at worst  He notes that his pain is "not too bad" in his knee or back upon arrival and feels good when Kinesiotape is applied  He notes pain in VMO with front step ups and in vastus lateralis with lateral step ups that is reduced but not eliminated with Kinesiotaping as noted below      Objective: See treatment diary below    Precautions: none     Daily Treatment Diary   Manuals 10/22 10/25 10/29  11/1               Stretching for HS and HF np SH  AF ---                Kinesiotape **       [facility]                                                               Exercise Diary  10/22 10/25  10/29  11/1               Bike 5 mins 6 mins  6 min 6 mins         HS Stretch 3 x 30" Active 90/90  30"x3 ea Active 90/90  30"x3 ea  90/90 30"x3         HF Stretch 3 x 30"     EOT  30"x3         SLR x 4 x20 ea on R LE 20x R 20x R  20x ea R         SL Bridge nv 15x R  15xR ---         Mini-Squats x20 20x 20x   20x         SLS on Airex nv 20"x5 20"x5  held         Forward Step-Ups x20 2 risers 20x R 2 risers 20x R  3 risers 20x R         Lateral Step-Ups x20 ea no HHA 2 risers  20x ea 2 risers  20x ea  3 risers 20x R         Single leg HR   R 20x R 20x  R 20x         Lateral dips     1 riser  20x                                                                                                                                                                             Modalities             CP R Knee  Post-Tx                           **Kinesiotape applied for patellar tracking, VMO inhibition, and vastus lateralis inhibition    Assessment: Tolerated treatment well  Patient demonstrated fatigue post treatment, exhibited good technique with therapeutic exercises and would benefit from continued PT to improve quad stability and reduce pain with TKE  Plan: Continue per plan of care  Progress treatment as tolerated        Emily Alfaro PTA

## 2018-11-05 ENCOUNTER — APPOINTMENT (OUTPATIENT)
Dept: PHYSICAL THERAPY | Facility: CLINIC | Age: 33
End: 2018-11-05
Payer: COMMERCIAL

## 2018-11-06 ENCOUNTER — OFFICE VISIT (OUTPATIENT)
Dept: PHYSICAL THERAPY | Facility: CLINIC | Age: 33
End: 2018-11-06
Payer: COMMERCIAL

## 2018-11-06 DIAGNOSIS — M25.561 CHRONIC PAIN OF RIGHT KNEE: Primary | ICD-10-CM

## 2018-11-06 DIAGNOSIS — G89.29 CHRONIC PAIN OF RIGHT KNEE: Primary | ICD-10-CM

## 2018-11-06 PROCEDURE — 97140 MANUAL THERAPY 1/> REGIONS: CPT

## 2018-11-06 PROCEDURE — 97112 NEUROMUSCULAR REEDUCATION: CPT

## 2018-11-06 PROCEDURE — 97110 THERAPEUTIC EXERCISES: CPT

## 2018-11-06 NOTE — PROGRESS NOTES
Daily Note     Today's date: 2018  Patient name: Christo Tamez  : 1985  MRN: [id number]  Referring provider: Srini Porras MD  Dx:   Encounter Diagnosis     ICD-10-CM    1  Chronic pain of right knee M25 561     G89 29               Subjective: Pt reports that his knee feels much better since the Kinesiotaping LV and notes improved LBP since patch was applied a few weeks ago  Objective: See treatment diary below    Precautions: none     Daily Treatment Diary   Manuals 10/22 10/25 10/29  11/1  11/6             Stretching for HS and HF np   AF ---   ---             Kinesiotape **       [facility]                                                             Exercise Diary  10/22 10/25  10/29  11/1  11/6             Bike 5 mins 6 mins  6 min 6 mins 5 mins        HS Stretch 3 x 30" Active 90/90  30"x3 ea Active 90/90  30"x3 ea  90/90 30"x3 90/90 30"x3        HF Stretch 3 x 30"     EOT  30"x3 EOT 30"x3        SLR x 4 x20 ea on R LE 20x R 20x R  20x ea R 20x ea R        SL Bridge nv 15x R  15xR --- 15x R        Mini-Squats x20 20x 20x   20x         SLS on Airex nv 20"x5 20"x5  held ---        Forward Step-Ups x20 2 risers 20x R 2 risers 20x R  3 risers 20x R BOSU w/high knee   15x ea        Lateral Step-Ups x20 ea no HHA 2 risers  20x ea 2 risers  20x ea  3 risers 20x R 3 risers 20x R        Single leg HR   R 20x R 20x  R 20x 20x R        Lateral dips     1 riser  20x 1 riser  20x                                                                                        Modalities             CP R Knee  Post-Tx               Assessment: Tolerated treatment well  Patient demonstrated fatigue post treatment, exhibited good technique with therapeutic exercises and would benefit from continued PT to further improve knee stability and muscular endurance to return to work pain-free  Plan: Continue per plan of care  Progress treatment as tolerated        Dov Obregon, PTA

## 2018-11-08 ENCOUNTER — OFFICE VISIT (OUTPATIENT)
Dept: PHYSICAL THERAPY | Facility: CLINIC | Age: 33
End: 2018-11-08
Payer: COMMERCIAL

## 2018-11-08 DIAGNOSIS — M25.561 CHRONIC PAIN OF RIGHT KNEE: Primary | ICD-10-CM

## 2018-11-08 DIAGNOSIS — G89.29 CHRONIC PAIN OF RIGHT KNEE: Primary | ICD-10-CM

## 2018-11-08 PROCEDURE — 97140 MANUAL THERAPY 1/> REGIONS: CPT

## 2018-11-08 PROCEDURE — 97112 NEUROMUSCULAR REEDUCATION: CPT

## 2018-11-08 PROCEDURE — 97110 THERAPEUTIC EXERCISES: CPT

## 2018-11-08 NOTE — PROGRESS NOTES
Daily Note     Today's date: 2018  Patient name: Geronimo Hirsch  : 1985  MRN: [id number]  Referring provider: Darrian Oneil MD  Dx:   Encounter Diagnosis     ICD-10-CM    1  Chronic pain of right knee M25 561     G89 29                 Subjective: Pt reports significant pain upon arrival after working all day, despite wearing the brace  Pain reduced post-session  Objective: See treatment diary below    Precautions: none     Daily Treatment Diary   Manuals 10/22 10/25 10/29  11/1  11/6  11/8           Stretching for HS and HF np SH  AF ---   --- [facility]           Kinesiotape **       [facility] [address] [facility]                                                           Exercise Diary  10/22 10/25  10/29  11/1  11/6 11/8            Bike 5 mins 6 mins  6 min 6 mins 5 mins 5 mins       HS Stretch 3 x 30" Active 90/90  30"x3 ea Active 90/90  30"x3 ea  90/90 30"x3 90/90 30"x3 Stool seated 30"x3       HF Stretch 3 x 30"     EOT  30"x3 EOT 30"x3 EOT opp SKTC 30"x3       SLR x 4 x20 ea on R LE 20x R 20x R  20x ea R 20x ea R 20x ea R       SL Bridge nv 15x R  15xR --- 15x R 15x R       Mini-Squats x20 20x 20x   20x  20x       SLS on Airex nv 20"x5 20"x5  held --- 20"x5       Forward Step-Ups x20 2 risers 20x R 2 risers 20x R  3 risers 20x R BOSU w/high knee   15x ea 3 risers 20x R       Lateral Step-Ups x20 ea no HHA 2 risers  20x ea 2 risers  20x ea  3 risers 20x R 3 risers 20x R 3 risers 20x R       Single leg HR   R 20x R 20x  R 20x 20x R 20x R       Lateral dips     1 riser  20x 1 riser  20x 0 risers  20x       Prone ext w/knee flexed      20x                                                                         Modalities             CP R Knee  Post-Tx               Assessment: Tolerated treatment well  Patient demonstrated fatigue post treatment, exhibited good technique with therapeutic exercises and would benefit from continued PT to address pain and improve symptoms at work      Plan: Continue per plan of care   Progress treatment as tolerated        Lynne Nash, PTA

## 2018-11-20 ENCOUNTER — OFFICE VISIT (OUTPATIENT)
Dept: PHYSICAL THERAPY | Facility: CLINIC | Age: 33
End: 2018-11-20
Payer: COMMERCIAL

## 2018-11-20 DIAGNOSIS — G89.29 CHRONIC PAIN OF RIGHT KNEE: Primary | ICD-10-CM

## 2018-11-20 DIAGNOSIS — M25.561 CHRONIC PAIN OF RIGHT KNEE: Primary | ICD-10-CM

## 2018-11-20 PROCEDURE — G8990 OTHER PT/OT CURRENT STATUS: HCPCS

## 2018-11-20 PROCEDURE — G8992 OTHER PT/OT  D/C STATUS: HCPCS

## 2018-11-20 PROCEDURE — 97110 THERAPEUTIC EXERCISES: CPT

## 2018-11-20 PROCEDURE — G8991 OTHER PT/OT GOAL STATUS: HCPCS

## 2018-11-20 PROCEDURE — 97140 MANUAL THERAPY 1/> REGIONS: CPT

## 2018-11-20 PROCEDURE — 97112 NEUROMUSCULAR REEDUCATION: CPT

## 2018-11-20 NOTE — PROGRESS NOTES
Daily Note     Today's date: 2018  Patient name: Oneil Ortiz  : 1985  MRN: [id number]  Referring provider: Dinesh Holland MD  Dx:   Encounter Diagnosis     ICD-10-CM    1  Chronic pain of right knee M25 561     G89 29               Subjective: Pt denies R knee pain and reports significant R ankle pain anteriorly from lateral malleoli to midline  Objective: See treatment diary below  Fig 8: L 52 2cm, R: 53 7cm  (-) navicular/base of the 5th palpation, pt able to bear weight  No ecchymosis, edema noted  Pain reported in lateral malleoli and the dome of the talus      Precautions: none     Daily Treatment Diary   Manuals 10/22 10/25 10/29  11/1  11/6  11/8  11/20         Stretching for HS and HF np SH  AF ---   --- [facility]           Kinesiotape **       [facility]            L ankle measurements             [place]                                  Exercise Diary  10/22 10/25  10/29  11/1  11/6 11/8   11/20         Bike 5 mins 6 mins  6 min 6 mins 5 mins 5 mins 5 mins      HS Stretch 3 x 30" Active 90/90  30"x3 ea Active 90/90  30"x3 ea  90/90 30"x3 90/90 30"x3 Stool seated 30"x3 longsit  30"x3      HF Stretch 3 x 30"     EOT  30"x3 EOT 30"x3 EOT opp SKTC 30"x3 EOT opp SKTC 30"x3      SLR x 4 x20 ea on R LE 20x R 20x R  20x ea R 20x ea R 20x ea R 20x ea R      SL Bridge nv 15x R  15xR --- 15x R 15x R ---      Mini-Squats x20 20x 20x   20x  20x 20x      SLS on Airex nv 20"x5 20"x5  held --- 20"x5 ---      Forward Step-Ups x20 2 risers 20x R 2 risers 20x R  3 risers 20x R BOSU w/high knee   15x ea 3 risers 20x R ---      Lateral Step-Ups x20 ea no HHA 2 risers  20x ea 2 risers  20x ea  3 risers 20x R 3 risers 20x R 3 risers 20x R ---      Single leg HR   R 20x R 20x  R 20x 20x R 20x R 20x R      Lateral dips     1 riser  20x 1 riser  20x 0 risers  20x ---      Prone ext w/knee flexed      20x 20x      prone TKE       5"x20                                                          Modalities 11/20      CP R Knee  Post-Tx       10' R ankle        Assessment: Tolerated treatment well  Patient demonstrated fatigue post treatment, exhibited good technique with therapeutic exercises and would benefit from continued PT to further improve R knee stability and reduce R ankle pain and swelling  Plan: Continue per plan of care  Progress treatment as tolerated        Milo Dove, PTA

## 2019-04-10 ENCOUNTER — APPOINTMENT (EMERGENCY)
Dept: CT IMAGING | Facility: HOSPITAL | Age: 34
End: 2019-04-10
Payer: COMMERCIAL

## 2019-04-10 ENCOUNTER — HOSPITAL ENCOUNTER (EMERGENCY)
Facility: HOSPITAL | Age: 34
Discharge: HOME | End: 2019-04-11
Attending: EMERGENCY MEDICINE | Admitting: EMERGENCY MEDICINE
Payer: COMMERCIAL

## 2019-04-10 VITALS
TEMPERATURE: 97.4 F | RESPIRATION RATE: 16 BRPM | HEART RATE: 82 BPM | BODY MASS INDEX: 21.08 KG/M2 | WEIGHT: 155.42 LBS | SYSTOLIC BLOOD PRESSURE: 127 MMHG | OXYGEN SATURATION: 99 % | DIASTOLIC BLOOD PRESSURE: 69 MMHG

## 2019-04-10 DIAGNOSIS — N39.0 UTI (URINARY TRACT INFECTION): Primary | ICD-10-CM

## 2019-04-10 LAB
BILIRUB UR QL STRIP: NEGATIVE
CLARITY UR: ABNORMAL
COLOR UR: YELLOW
COLOR, POC: YELLOW
GLUCOSE UR STRIP-MCNC: NEGATIVE MG/DL
HGB UR QL STRIP.AUTO: NEGATIVE
KETONES UR STRIP-MCNC: NEGATIVE MG/DL
LEUKOCYTE ESTERASE UR QL STRIP: NEGATIVE
NITRITE UR QL STRIP: NEGATIVE
PH UR STRIP.AUTO: 8.5 [PH] (ref 4.5–8)
PROT UR STRIP-MCNC: NEGATIVE MG/DL
SP GR UR STRIP.AUTO: 1.02 (ref 1–1.03)
UROBILINOGEN UR QL STRIP.AUTO: 1 E.U./DL

## 2019-04-10 PROCEDURE — 81003 URINALYSIS AUTO W/O SCOPE: CPT

## 2019-04-10 PROCEDURE — 81003 URINALYSIS AUTO W/O SCOPE: CPT | Performed by: EMERGENCY MEDICINE

## 2019-04-10 PROCEDURE — 99284 EMERGENCY DEPT VISIT MOD MDM: CPT

## 2019-04-10 PROCEDURE — 96372 THER/PROPH/DIAG INJ SC/IM: CPT

## 2019-04-10 PROCEDURE — 99284 EMERGENCY DEPT VISIT MOD MDM: CPT | Performed by: EMERGENCY MEDICINE

## 2019-04-10 PROCEDURE — 74176 CT ABD & PELVIS W/O CONTRAST: CPT

## 2019-04-10 RX ORDER — KETOROLAC TROMETHAMINE 30 MG/ML
30 INJECTION, SOLUTION INTRAMUSCULAR; INTRAVENOUS ONCE
Status: COMPLETED | OUTPATIENT
Start: 2019-04-10 | End: 2019-04-10

## 2019-04-10 RX ORDER — CEPHALEXIN 500 MG/1
500 CAPSULE ORAL EVERY 12 HOURS SCHEDULED
Qty: 14 CAPSULE | Refills: 0 | Status: SHIPPED | OUTPATIENT
Start: 2019-04-10 | End: 2019-04-17

## 2019-04-10 RX ADMIN — KETOROLAC TROMETHAMINE 30 MG: 30 INJECTION, SOLUTION INTRAMUSCULAR at 23:20

## 2019-04-11 LAB
BILIRUB UR QL STRIP: NEGATIVE
CLARITY UR: NORMAL
COLOR UR: YELLOW
GLUCOSE UR STRIP-MCNC: NEGATIVE MG/DL
HGB UR QL STRIP.AUTO: NEGATIVE
KETONES UR STRIP-MCNC: NEGATIVE MG/DL
LEUKOCYTE ESTERASE UR QL STRIP: NEGATIVE
NITRITE UR QL STRIP: NEGATIVE
PH UR STRIP.AUTO: 7.5 [PH]
PROT UR STRIP-MCNC: NEGATIVE MG/DL
SP GR UR STRIP.AUTO: 1.02 (ref 1–1.03)
UROBILINOGEN UR QL STRIP.AUTO: 1 E.U./DL

## 2019-04-26 ENCOUNTER — APPOINTMENT (EMERGENCY)
Dept: RADIOLOGY | Facility: HOSPITAL | Age: 34
End: 2019-04-26
Payer: COMMERCIAL

## 2019-04-26 ENCOUNTER — HOSPITAL ENCOUNTER (EMERGENCY)
Facility: HOSPITAL | Age: 34
Discharge: HOME | End: 2019-04-26
Attending: EMERGENCY MEDICINE | Admitting: EMERGENCY MEDICINE
Payer: COMMERCIAL

## 2019-04-26 VITALS
BODY MASS INDEX: 21.68 KG/M2 | RESPIRATION RATE: 16 BRPM | SYSTOLIC BLOOD PRESSURE: 117 MMHG | OXYGEN SATURATION: 98 % | DIASTOLIC BLOOD PRESSURE: 62 MMHG | HEART RATE: 83 BPM | WEIGHT: 159.83 LBS | TEMPERATURE: 97.8 F

## 2019-04-26 DIAGNOSIS — M54.6 LEFT-SIDED THORACIC BACK PAIN: Primary | ICD-10-CM

## 2019-04-26 LAB
BACTERIA UR QL AUTO: ABNORMAL /HPF
BILIRUB UR QL STRIP: NEGATIVE
CLARITY UR: CLEAR
COLOR UR: YELLOW
DEPRECATED D DIMER PPP: <270 NG/ML (FEU)
GLUCOSE UR STRIP-MCNC: NEGATIVE MG/DL
HGB UR QL STRIP.AUTO: NEGATIVE
KETONES UR STRIP-MCNC: NEGATIVE MG/DL
LEUKOCYTE ESTERASE UR QL STRIP: ABNORMAL
NITRITE UR QL STRIP: NEGATIVE
NON-SQ EPI CELLS URNS QL MICRO: ABNORMAL /HPF
PH UR STRIP.AUTO: 6.5 [PH] (ref 4.5–8)
PROT UR STRIP-MCNC: NEGATIVE MG/DL
RBC #/AREA URNS AUTO: ABNORMAL /HPF
SP GR UR STRIP.AUTO: >=1.03 (ref 1–1.03)
TROPONIN I SERPL-MCNC: <0.02 NG/ML
UROBILINOGEN UR QL STRIP.AUTO: 0.2 E.U./DL
WBC #/AREA URNS AUTO: ABNORMAL /HPF

## 2019-04-26 PROCEDURE — 93005 ELECTROCARDIOGRAM TRACING: CPT

## 2019-04-26 PROCEDURE — 99284 EMERGENCY DEPT VISIT MOD MDM: CPT

## 2019-04-26 PROCEDURE — 96372 THER/PROPH/DIAG INJ SC/IM: CPT

## 2019-04-26 PROCEDURE — 87491 CHLMYD TRACH DNA AMP PROBE: CPT | Performed by: PHYSICIAN ASSISTANT

## 2019-04-26 PROCEDURE — 87591 N.GONORRHOEAE DNA AMP PROB: CPT | Performed by: PHYSICIAN ASSISTANT

## 2019-04-26 PROCEDURE — 85379 FIBRIN DEGRADATION QUANT: CPT | Performed by: PHYSICIAN ASSISTANT

## 2019-04-26 PROCEDURE — 71046 X-RAY EXAM CHEST 2 VIEWS: CPT

## 2019-04-26 PROCEDURE — 81001 URINALYSIS AUTO W/SCOPE: CPT

## 2019-04-26 PROCEDURE — 99283 EMERGENCY DEPT VISIT LOW MDM: CPT | Performed by: EMERGENCY MEDICINE

## 2019-04-26 PROCEDURE — 84484 ASSAY OF TROPONIN QUANT: CPT | Performed by: PHYSICIAN ASSISTANT

## 2019-04-26 PROCEDURE — 36415 COLL VENOUS BLD VENIPUNCTURE: CPT | Performed by: PHYSICIAN ASSISTANT

## 2019-04-26 RX ORDER — LIDOCAINE 50 MG/G
1 PATCH TOPICAL ONCE
Status: DISCONTINUED | OUTPATIENT
Start: 2019-04-26 | End: 2019-04-26 | Stop reason: HOSPADM

## 2019-04-26 RX ORDER — KETOROLAC TROMETHAMINE 30 MG/ML
15 INJECTION, SOLUTION INTRAMUSCULAR; INTRAVENOUS ONCE
Status: COMPLETED | OUTPATIENT
Start: 2019-04-26 | End: 2019-04-26

## 2019-04-26 RX ORDER — NAPROXEN 500 MG/1
500 TABLET ORAL 2 TIMES DAILY WITH MEALS
Qty: 12 TABLET | Refills: 0 | Status: SHIPPED | OUTPATIENT
Start: 2019-04-26 | End: 2019-05-28

## 2019-04-26 RX ADMIN — KETOROLAC TROMETHAMINE 15 MG: 30 INJECTION, SOLUTION INTRAMUSCULAR at 18:01

## 2019-04-26 RX ADMIN — LIDOCAINE 1 PATCH: 50 PATCH TOPICAL at 18:00

## 2019-04-28 ENCOUNTER — APPOINTMENT (EMERGENCY)
Dept: RADIOLOGY | Facility: HOSPITAL | Age: 34
End: 2019-04-28
Payer: COMMERCIAL

## 2019-04-28 ENCOUNTER — HOSPITAL ENCOUNTER (EMERGENCY)
Facility: HOSPITAL | Age: 34
Discharge: HOME | End: 2019-04-28
Attending: EMERGENCY MEDICINE
Payer: COMMERCIAL

## 2019-04-28 VITALS
OXYGEN SATURATION: 100 % | RESPIRATION RATE: 18 BRPM | TEMPERATURE: 98.4 F | HEART RATE: 82 BPM | BODY MASS INDEX: 21.83 KG/M2 | WEIGHT: 160.94 LBS | SYSTOLIC BLOOD PRESSURE: 101 MMHG | DIASTOLIC BLOOD PRESSURE: 64 MMHG

## 2019-04-28 DIAGNOSIS — M62.830 SPASM OF THORACIC BACK MUSCLE: Primary | ICD-10-CM

## 2019-04-28 LAB
C TRACH DNA SPEC QL NAA+PROBE: NEGATIVE
N GONORRHOEA DNA SPEC QL NAA+PROBE: NEGATIVE

## 2019-04-28 PROCEDURE — 96372 THER/PROPH/DIAG INJ SC/IM: CPT

## 2019-04-28 PROCEDURE — 99283 EMERGENCY DEPT VISIT LOW MDM: CPT

## 2019-04-28 PROCEDURE — 99285 EMERGENCY DEPT VISIT HI MDM: CPT | Performed by: PHYSICIAN ASSISTANT

## 2019-04-28 PROCEDURE — 72072 X-RAY EXAM THORAC SPINE 3VWS: CPT

## 2019-04-28 RX ORDER — KETOROLAC TROMETHAMINE 30 MG/ML
30 INJECTION, SOLUTION INTRAMUSCULAR; INTRAVENOUS ONCE
Status: COMPLETED | OUTPATIENT
Start: 2019-04-28 | End: 2019-04-28

## 2019-04-28 RX ORDER — DIAZEPAM 5 MG/1
5 TABLET ORAL EVERY 8 HOURS PRN
Qty: 15 TABLET | Refills: 0 | Status: SHIPPED | OUTPATIENT
Start: 2019-04-28 | End: 2019-05-28

## 2019-04-28 RX ORDER — DIAZEPAM 5 MG/1
5 TABLET ORAL ONCE
Status: COMPLETED | OUTPATIENT
Start: 2019-04-28 | End: 2019-04-28

## 2019-04-28 RX ORDER — PREDNISONE 20 MG/1
20 TABLET ORAL 2 TIMES DAILY WITH MEALS
Qty: 10 TABLET | Refills: 0 | Status: SHIPPED | OUTPATIENT
Start: 2019-04-28 | End: 2019-05-03

## 2019-04-28 RX ADMIN — DIAZEPAM 5 MG: 5 TABLET ORAL at 18:44

## 2019-04-28 RX ADMIN — KETOROLAC TROMETHAMINE 30 MG: 30 INJECTION, SOLUTION INTRAMUSCULAR at 18:43

## 2019-04-29 LAB
ATRIAL RATE: 58 BPM
P AXIS: -3 DEGREES
PR INTERVAL: 150 MS
QRS AXIS: 96 DEGREES
QRSD INTERVAL: 96 MS
QT INTERVAL: 432 MS
QTC INTERVAL: 424 MS
T WAVE AXIS: 66 DEGREES
VENTRICULAR RATE: 58 BPM

## 2019-04-29 PROCEDURE — 93010 ELECTROCARDIOGRAM REPORT: CPT | Performed by: INTERNAL MEDICINE

## 2019-05-28 ENCOUNTER — OFFICE VISIT (OUTPATIENT)
Dept: FAMILY MEDICINE CLINIC | Facility: CLINIC | Age: 34
End: 2019-05-28

## 2019-05-28 ENCOUNTER — APPOINTMENT (OUTPATIENT)
Dept: LAB | Facility: HOSPITAL | Age: 34
End: 2019-05-28
Payer: COMMERCIAL

## 2019-05-28 VITALS
BODY MASS INDEX: 23.05 KG/M2 | HEART RATE: 83 BPM | WEIGHT: 161 LBS | SYSTOLIC BLOOD PRESSURE: 102 MMHG | HEIGHT: 70 IN | OXYGEN SATURATION: 99 % | TEMPERATURE: 98.3 F | RESPIRATION RATE: 18 BRPM | DIASTOLIC BLOOD PRESSURE: 60 MMHG

## 2019-05-28 DIAGNOSIS — R30.0 DYSURIA: ICD-10-CM

## 2019-05-28 DIAGNOSIS — R07.9 CHEST PAIN, UNSPECIFIED TYPE: ICD-10-CM

## 2019-05-28 DIAGNOSIS — Z91.89 ENCOUNTER FOR HEPATITIS C VIRUS SCREENING TEST FOR HIGH RISK PATIENT: ICD-10-CM

## 2019-05-28 DIAGNOSIS — R07.9 CHEST PAIN, UNSPECIFIED TYPE: Primary | ICD-10-CM

## 2019-05-28 DIAGNOSIS — Z11.59 ENCOUNTER FOR HEPATITIS C VIRUS SCREENING TEST FOR HIGH RISK PATIENT: ICD-10-CM

## 2019-05-28 DIAGNOSIS — K59.09 OTHER CONSTIPATION: ICD-10-CM

## 2019-05-28 PROBLEM — K59.00 DIFFICULT BOWEL MOVEMENTS: Status: ACTIVE | Noted: 2019-05-28

## 2019-05-28 PROBLEM — G89.29 CHRONIC PAIN OF RIGHT KNEE: Status: ACTIVE | Noted: 2018-09-06

## 2019-05-28 LAB
ALBUMIN SERPL BCP-MCNC: 4.4 G/DL (ref 3–5.2)
ALP SERPL-CCNC: 52 U/L (ref 43–122)
ALT SERPL W P-5'-P-CCNC: 25 U/L (ref 9–52)
ANION GAP SERPL CALCULATED.3IONS-SCNC: 7 MMOL/L (ref 5–14)
AST SERPL W P-5'-P-CCNC: 19 U/L (ref 17–59)
BASOPHILS # BLD AUTO: 0 THOUSANDS/ΜL (ref 0–0.1)
BASOPHILS NFR BLD AUTO: 1 % (ref 0–1)
BILIRUB SERPL-MCNC: 0.5 MG/DL
BILIRUB UR QL STRIP: NEGATIVE
BUN SERPL-MCNC: 10 MG/DL (ref 5–25)
CALCIUM SERPL-MCNC: 9.6 MG/DL (ref 8.4–10.2)
CHLORIDE SERPL-SCNC: 102 MMOL/L (ref 97–108)
CLARITY UR: CLEAR
CO2 SERPL-SCNC: 31 MMOL/L (ref 22–30)
COLOR UR: NORMAL
CREAT SERPL-MCNC: 0.91 MG/DL (ref 0.7–1.5)
EOSINOPHIL # BLD AUTO: 0.2 THOUSAND/ΜL (ref 0–0.4)
EOSINOPHIL NFR BLD AUTO: 4 % (ref 0–6)
ERYTHROCYTE [DISTWIDTH] IN BLOOD BY AUTOMATED COUNT: 13 %
GFR SERPL CREATININE-BSD FRML MDRD: 110 ML/MIN/1.73SQ M
GLUCOSE SERPL-MCNC: 104 MG/DL (ref 70–99)
GLUCOSE UR STRIP-MCNC: NEGATIVE MG/DL
HCT VFR BLD AUTO: 43.8 % (ref 41–53)
HGB BLD-MCNC: 14.7 G/DL (ref 13.5–17.5)
HGB UR QL STRIP.AUTO: NEGATIVE
KETONES UR STRIP-MCNC: NEGATIVE MG/DL
LEUKOCYTE ESTERASE UR QL STRIP: NEGATIVE
LYMPHOCYTES # BLD AUTO: 1.4 THOUSANDS/ΜL (ref 0.5–4)
LYMPHOCYTES NFR BLD AUTO: 30 % (ref 25–45)
MCH RBC QN AUTO: 31.9 PG (ref 26–34)
MCHC RBC AUTO-ENTMCNC: 33.6 G/DL (ref 31–36)
MCV RBC AUTO: 95 FL (ref 80–100)
MONOCYTES # BLD AUTO: 0.3 THOUSAND/ΜL (ref 0.2–0.9)
MONOCYTES NFR BLD AUTO: 8 % (ref 1–10)
NEUTROPHILS # BLD AUTO: 2.6 THOUSANDS/ΜL (ref 1.8–7.8)
NEUTS SEG NFR BLD AUTO: 57 % (ref 45–65)
NITRITE UR QL STRIP: NEGATIVE
PH UR STRIP.AUTO: 7 [PH]
PLATELET # BLD AUTO: 203 THOUSANDS/UL (ref 150–450)
PMV BLD AUTO: 9.5 FL (ref 8.9–12.7)
POTASSIUM SERPL-SCNC: 4.3 MMOL/L (ref 3.6–5)
PROT SERPL-MCNC: 7 G/DL (ref 5.9–8.4)
PROT UR STRIP-MCNC: NEGATIVE MG/DL
RBC # BLD AUTO: 4.62 MILLION/UL (ref 4.5–5.9)
SODIUM SERPL-SCNC: 140 MMOL/L (ref 137–147)
SP GR UR STRIP.AUTO: 1.01 (ref 1–1.04)
TSH SERPL DL<=0.05 MIU/L-ACNC: 1.08 UIU/ML (ref 0.47–4.68)
UROBILINOGEN UA: NEGATIVE MG/DL
WBC # BLD AUTO: 4.6 THOUSAND/UL (ref 4.5–11)

## 2019-05-28 PROCEDURE — 85025 COMPLETE CBC W/AUTO DIFF WBC: CPT

## 2019-05-28 PROCEDURE — 3008F BODY MASS INDEX DOCD: CPT | Performed by: INTERNAL MEDICINE

## 2019-05-28 PROCEDURE — 80053 COMPREHEN METABOLIC PANEL: CPT

## 2019-05-28 PROCEDURE — 84443 ASSAY THYROID STIM HORMONE: CPT

## 2019-05-28 PROCEDURE — 36415 COLL VENOUS BLD VENIPUNCTURE: CPT

## 2019-05-28 PROCEDURE — 99214 OFFICE O/P EST MOD 30 MIN: CPT | Performed by: INTERNAL MEDICINE

## 2019-05-28 PROCEDURE — 86803 HEPATITIS C AB TEST: CPT

## 2019-05-28 PROCEDURE — 87086 URINE CULTURE/COLONY COUNT: CPT

## 2019-05-28 RX ORDER — OMEPRAZOLE 40 MG/1
40 CAPSULE, DELAYED RELEASE ORAL DAILY
Qty: 30 CAPSULE | Refills: 1 | Status: SHIPPED | OUTPATIENT
Start: 2019-05-28 | End: 2021-01-28

## 2019-05-29 ENCOUNTER — TELEPHONE (OUTPATIENT)
Dept: LABOR AND DELIVERY | Facility: HOSPITAL | Age: 34
End: 2019-05-29

## 2019-05-29 LAB
BACTERIA UR CULT: NORMAL
HCV AB SER QL: NORMAL

## 2019-06-03 ENCOUNTER — HOSPITAL ENCOUNTER (OUTPATIENT)
Dept: NON INVASIVE DIAGNOSTICS | Facility: HOSPITAL | Age: 34
Discharge: HOME | End: 2019-06-03
Payer: COMMERCIAL

## 2019-06-03 DIAGNOSIS — R07.9 CHEST PAIN, UNSPECIFIED TYPE: ICD-10-CM

## 2019-06-03 LAB
CHEST PAIN STATEMENT: NORMAL
MAX DIASTOLIC BP: 40 MMHG
MAX HEART RATE: 164 BPM
MAX PREDICTED HEART RATE: 186 BPM
MAX. SYSTOLIC BP: 150 MMHG
PROTOCOL NAME: NORMAL
REASON FOR TERMINATION: NORMAL
TARGET HR FORMULA: NORMAL
TEST INDICATION: NORMAL
TIME IN EXERCISE PHASE: NORMAL

## 2019-06-03 PROCEDURE — 93018 CV STRESS TEST I&R ONLY: CPT | Performed by: INTERNAL MEDICINE

## 2019-06-03 PROCEDURE — 93017 CV STRESS TEST TRACING ONLY: CPT

## 2019-06-03 PROCEDURE — 93016 CV STRESS TEST SUPVJ ONLY: CPT | Performed by: INTERNAL MEDICINE

## 2019-06-11 ENCOUNTER — TELEPHONE (OUTPATIENT)
Dept: FAMILY MEDICINE CLINIC | Facility: CLINIC | Age: 34
End: 2019-06-11

## 2021-01-28 ENCOUNTER — OFFICE VISIT (OUTPATIENT)
Dept: FAMILY MEDICINE CLINIC | Facility: CLINIC | Age: 36
End: 2021-01-28

## 2021-01-28 VITALS
HEIGHT: 72 IN | OXYGEN SATURATION: 97 % | HEART RATE: 79 BPM | TEMPERATURE: 98.1 F | DIASTOLIC BLOOD PRESSURE: 62 MMHG | SYSTOLIC BLOOD PRESSURE: 108 MMHG | RESPIRATION RATE: 18 BRPM | WEIGHT: 160.1 LBS | BODY MASS INDEX: 21.68 KG/M2

## 2021-01-28 DIAGNOSIS — L72.0 INCLUSION CYST: Primary | ICD-10-CM

## 2021-01-28 PROCEDURE — 99213 OFFICE O/P EST LOW 20 MIN: CPT | Performed by: FAMILY MEDICINE

## 2021-01-28 NOTE — PROGRESS NOTES
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  Recommended considering online support groups  2  The presentation of his scalp and behind the ears is suggestive of epidermal inclusion cysts  Due to the locations I recommended dermatologist evaluation for best cosmetic result  3  Counseled for 3 minutes on flu shot  Declined  We will discuss it again at future visits  4  RTO p r n  I let the patient know that my last day in this office is June 30  The above was discussed in layman's terms, the patient was engaged using the shared-decision making process, verbalized understanding of the treatment plan, and all questions were answered to the patient's satisfaction  Diagnoses and all orders for this visit:    Inclusion cyst  -     Ambulatory referral to Dermatology; Future    Other orders  -     Cancel: influenza vaccine, quadrivalent, 0 5 mL, preservative-free, for adult and pediatric patients 6 mos+ (AFLURIA, FLUARIX, FLULAVAL, FLUZONE)          Subjective/HPI  1  Patient is well known to me  Seen today with his wife and 2 children  No significant past medical history other than COVID infection in November  Since then he has lost his ability to taste or smell  He has tried numerous recommendations from [employer] and [employer] but none of them have worked  This is a significant impairment in his life and limits his enjoyment of his wife's cooking  2  He also has a concerning bump in the middle of his head  He also gets similar bumps on the backs of his ears  Right now he only has the 1 on his head    It is not that bothersome but the ones behind the ears get painful when they recur  Review of Systems  Constitutional: Negative for activity change, appetite change, chills and fever  Respiratory: Negative for shortness of breath  Cardiovascular: Negative for chest pain  Gastrointestinal: Negative for blood in stool, nausea and vomiting  Genitourinary: Negative for difficulty urinating and dysuria  Pertinent items are noted in chief complaint and HPI  Social History    reports that he has been smoking  He has never used smokeless tobacco     reports current alcohol use    reports current drug use  Frequency: 3 00 times per week  Drug: Marijuana  Objective  Physical Exam  Vitals signs reviewed  Constitutional:       Appearance: Normal appearance  He is well-developed  Cardiovascular:      Heart sounds: Normal heart sounds  Pulmonary:      Effort: Pulmonary effort is normal       Breath sounds: Normal breath sounds  Neurological:      Mental Status: He is oriented to person, place, and time  Midpoint of the head with superficial 1 centimetre by 1 centimetre nodule  Nontender  Not warm  No erythema  Bilateral posterior ears appear normal   No head and neck lymphadenopathy  This note has been dictated using [employer] software  It may contain errors, including improperly dictated words  Please contact physician directly for any questions  Chart Review/Health Maintenance   The following have been updated: none    Allergies   Allergen Reactions    Shellfish-Derived Products Anaphylaxis and Rash   No current outpatient medications on file      Patient Active Problem List   Diagnosis    Chronic pain of right knee    Allergy to shellfish    Chest pain    Other constipation    Dysuria     Past Surgical History:   Procedure Laterality Date    CHOLECYSTECTOMY       Family History   Problem Relation Age of Onset    Hyperthyroidism Mother     Colon cancer Neg Hx      Health Maintenance   Topic Date Due    HIV Screening 08/03/1999    Annual Physical  08/03/2002    Depression Screening PHQ  10/19/2019    Influenza Vaccine (1) 09/01/2020    BMI: Adult  01/28/2022    DTaP,Tdap,and Td Vaccines (2 - Td) 09/06/2028    Hepatitis C Screening  Completed    Pneumococcal Vaccine: Pediatrics (0 to 5 Years) and At-Risk Patients (6 to 59 Years)  Completed    HIB Vaccine  Aged Out    Hepatitis B Vaccine  Aged Out    IPV Vaccine  Aged Out    Hepatitis A Vaccine  Aged Out    Meningococcal ACWY Vaccine  Aged Out    HPV Vaccine  Aged Out

## 2021-07-02 ENCOUNTER — HOSPITAL ENCOUNTER (EMERGENCY)
Facility: HOSPITAL | Age: 36
Discharge: HOME | End: 2021-07-02
Attending: EMERGENCY MEDICINE
Payer: COMMERCIAL

## 2021-07-02 ENCOUNTER — APPOINTMENT (EMERGENCY)
Dept: RADIOLOGY | Facility: HOSPITAL | Age: 36
End: 2021-07-02
Payer: COMMERCIAL

## 2021-07-02 VITALS
SYSTOLIC BLOOD PRESSURE: 121 MMHG | RESPIRATION RATE: 18 BRPM | DIASTOLIC BLOOD PRESSURE: 73 MMHG | HEART RATE: 72 BPM | OXYGEN SATURATION: 99 % | TEMPERATURE: 98.2 F | BODY MASS INDEX: 19.38 KG/M2 | WEIGHT: 142.86 LBS

## 2021-07-02 DIAGNOSIS — L03.116 CELLULITIS OF LEFT FOOT: Primary | ICD-10-CM

## 2021-07-02 PROCEDURE — 73630 X-RAY EXAM OF FOOT: CPT

## 2021-07-02 PROCEDURE — 99284 EMERGENCY DEPT VISIT MOD MDM: CPT | Performed by: PHYSICIAN ASSISTANT

## 2021-07-02 PROCEDURE — 99283 EMERGENCY DEPT VISIT LOW MDM: CPT

## 2021-07-02 RX ORDER — CEPHALEXIN 250 MG/1
500 CAPSULE ORAL ONCE
Status: COMPLETED | OUTPATIENT
Start: 2021-07-02 | End: 2021-07-02

## 2021-07-02 RX ORDER — SULFAMETHOXAZOLE AND TRIMETHOPRIM 800; 160 MG/1; MG/1
1 TABLET ORAL ONCE
Status: COMPLETED | OUTPATIENT
Start: 2021-07-02 | End: 2021-07-02

## 2021-07-02 RX ORDER — CEPHALEXIN 500 MG/1
500 CAPSULE ORAL EVERY 6 HOURS SCHEDULED
Qty: 28 CAPSULE | Refills: 0 | Status: SHIPPED | OUTPATIENT
Start: 2021-07-02 | End: 2021-07-09

## 2021-07-02 RX ORDER — SULFAMETHOXAZOLE AND TRIMETHOPRIM 800; 160 MG/1; MG/1
1 TABLET ORAL 2 TIMES DAILY
Qty: 14 TABLET | Refills: 0 | Status: SHIPPED | OUTPATIENT
Start: 2021-07-02 | End: 2021-07-09

## 2021-07-02 RX ADMIN — CEPHALEXIN 500 MG: 250 CAPSULE ORAL at 18:07

## 2021-07-02 RX ADMIN — SULFAMETHOXAZOLE AND TRIMETHOPRIM 1 TABLET: 800; 160 TABLET ORAL at 18:07

## 2021-07-02 NOTE — DISCHARGE INSTRUCTIONS
Take Keflex and Bactrim as prescribed for full 7 days  Continue Tylenol or ibuprofen at home as needed for pain  Rest, ice, elevation may help alleviate symptoms  Follow-up with PCP for monitoring of symptoms  Return to ED if symptoms worsen including increased pain, swelling, spreading redness, fevers, red streaking up the foot or leg, no improvement after 48 hours of antibiotics

## 2021-07-02 NOTE — ED PROVIDER NOTES
History  Chief Complaint   Patient presents with    Foot Swelling     Pt reports monique he was bit by something 3 days ago on the L foot  Now with area of erythema and swelling to the top of the foot  Patient is a 67-year-old male with no significant past medical history presents with left foot pain, swelling and redness for 3 days  Patient noted a small dime size area of erythema on the top of his foot that started 3 days ago  He is not aware of any injury, insect bite, sun exposure, puncture at that time  He notes gradual increase of the swelling and erythema over the last 2-3 days, now larger in size, tender to palpation and with ambulation  He denies any associated numbness, tingling, weakness of the toes, lymphatic streaking, fevers, chills, diaphoresis  He has been taking Tylenol, with improvement of pain, with no improvement of redness  Patient states he is otherwise in his usual state of health and denies any headache, congestion, cough, shortness of breath, chest pain, abdominal pain, nausea, vomiting, diarrhea, urinary changes, rash  None       History reviewed  No pertinent past medical history  Past Surgical History:   Procedure Laterality Date    CHOLECYSTECTOMY         Family History   Problem Relation Age of Onset    Hyperthyroidism Mother     Colon cancer Neg Hx      I have reviewed and agree with the history as documented  E-Cigarette/Vaping     E-Cigarette/Vaping Substances     Social History     Tobacco Use    Smoking status: Current Some Day Smoker    Smokeless tobacco: Never Used    Tobacco comment: about 2 cigarettes max every couple of days   Substance Use Topics    Alcohol use: Yes     Comment: occassional    Drug use: Yes     Frequency: 3 0 times per week     Types: Marijuana       Review of Systems   Constitutional: Negative for chills, diaphoresis and fever  HENT: Negative for congestion      Respiratory: Negative for cough, shortness of breath, wheezing and stridor  Cardiovascular: Negative for chest pain, palpitations and leg swelling  Gastrointestinal: Negative for abdominal pain, diarrhea, nausea and vomiting  Genitourinary: Negative for difficulty urinating  Musculoskeletal: Negative for joint swelling and myalgias  Left foot pain, redness, swelling   Skin: Positive for color change  Negative for pallor and rash  Neurological: Negative for weakness, light-headedness, numbness and headaches  All other systems reviewed and are negative  Physical Exam  Physical Exam  Vitals and nursing note reviewed  Constitutional:       General: He is awake  He is not in acute distress  Appearance: He is well-developed  He is not ill-appearing, toxic-appearing or diaphoretic  HENT:      Head: Normocephalic and atraumatic  Right Ear: External ear normal       Left Ear: External ear normal       Nose: Nose normal    Eyes:      Conjunctiva/sclera: Conjunctivae normal       Pupils: Pupils are equal, round, and reactive to light  Cardiovascular:      Rate and Rhythm: Normal rate and regular rhythm  Pulses: Normal pulses  Dorsalis pedis pulses are 2+ on the right side and 2+ on the left side  Posterior tibial pulses are 2+ on the right side and 2+ on the left side  Heart sounds: Normal heart sounds, S1 normal and S2 normal    Pulmonary:      Effort: Pulmonary effort is normal  No respiratory distress  Breath sounds: Normal breath sounds  No stridor  No decreased breath sounds or wheezing  Abdominal:      General: Bowel sounds are normal  There is no distension  Palpations: Abdomen is soft  Tenderness: There is no abdominal tenderness  Musculoskeletal:         General: Normal range of motion  Cervical back: Normal range of motion and neck supple  Left lower leg: Normal       Left ankle: Normal       Right foot: Normal       Left foot: Normal range of motion and normal capillary refill  Swelling and tenderness present  No laceration, bony tenderness or crepitus  Normal pulse  Feet:    Skin:     General: Skin is warm and dry  Capillary Refill: Capillary refill takes less than 2 seconds  Neurological:      Mental Status: He is alert and oriented to person, place, and time  Psychiatric:         Behavior: Behavior is cooperative  Vital Signs  ED Triage Vitals [07/02/21 1653]   Temperature Pulse Respirations Blood Pressure SpO2   98 2 °F (36 8 °C) 75 18 120/74 99 %      Temp Source Heart Rate Source Patient Position - Orthostatic VS BP Location FiO2 (%)   Oral Monitor Sitting Right arm --      Pain Score       Worst Possible Pain           Vitals:    07/02/21 1653 07/02/21 1902   BP: 120/74 121/73   Pulse: 75 72   Patient Position - Orthostatic VS: Sitting          Visual Acuity      ED Medications  Medications   cephalexin (KEFLEX) capsule 500 mg (500 mg Oral Given 7/2/21 1807)   sulfamethoxazole-trimethoprim (BACTRIM DS) 800-160 mg per tablet 1 tablet (1 tablet Oral Given 7/2/21 1807)       Diagnostic Studies  Results Reviewed     None                 XR foot 3+ views LEFT   ED Interpretation by Christine Edwards PA-C (07/02 1855)   No acute osseous abnormality or foreign body noted                 Procedures  Procedures         ED Course                             SBIRT 22yo+      Most Recent Value   SBIRT (23 yo +)   In order to provide better care to our patients, we are screening all of our patients for alcohol and drug use  Would it be okay to ask you these screening questions? No Filed at: 07/02/2021 1729                    MDM  Number of Diagnoses or Management Options  Cellulitis of left foot  Diagnosis management comments: Reviewed all results with patient, answered questions  Line was drawn around cellulitic area and provided education regarding monitoring, with strict return precautions  Reviewed medication education, treatment at home    Recommended follow-up with PCP for monitoring of symptoms  The management plan was discussed in detail with the patient at bedside and all questions were answered  Provided both verbal and written instructions  Reviewed red flag symptoms and strict return to ED instructions  Patient notes understanding and agrees to plan  Disposition  Final diagnoses:   Cellulitis of left foot     Time reflects when diagnosis was documented in both MDM as applicable and the Disposition within this note     Time User Action Codes Description Comment    7/2/2021  6:56 PM Gisele Tidwell [R06 [id number]] Cellulitis of left foot       ED Disposition     ED Disposition Condition Date/Time Comment    Discharge Stable Fri Jul 2, 2021  6:56 PM [address] discharge to home/self care  Follow-up Information     Follow up With Specialties Details Why [address] [facility] Emergency Department Emergency Medicine  If symptoms worsen [facility] [id number]  [address] [facility] Emergency Department, [address] [facility], [address] [facility], [address]    Follow-up with PCP for monitoring of symptoms               Discharge Medication List as of 7/2/2021  6:57 PM      START taking these medications    Details   cephalexin (KEFLEX) 500 mg capsule Take 1 capsule (500 mg total) by mouth every 6 (six) hours for 7 days, Starting Fri 7/2/2021, Until Fri 7/9/2021, Normal      sulfamethoxazole-trimethoprim (BACTRIM DS) 800-160 mg per tablet Take 1 tablet by mouth 2 (two) times a day for 7 days smx-tmp DS (BACTRIM) 800-160 mg tabs (1tab q12 D10), Starting Fri 7/2/2021, Until Fri 7/9/2021, Normal           No discharge procedures on file      PDMP Review     None          ED Provider  Electronically Signed by           Sulaiman Treviño PA-C  07/02/21 8774

## 2021-09-10 ENCOUNTER — HOSPITAL ENCOUNTER (EMERGENCY)
Facility: HOSPITAL | Age: 36
Discharge: HOME | End: 2021-09-10
Attending: EMERGENCY MEDICINE | Admitting: EMERGENCY MEDICINE
Payer: COMMERCIAL

## 2021-09-10 VITALS
DIASTOLIC BLOOD PRESSURE: 79 MMHG | HEART RATE: 54 BPM | BODY MASS INDEX: 19.43 KG/M2 | OXYGEN SATURATION: 97 % | SYSTOLIC BLOOD PRESSURE: 121 MMHG | TEMPERATURE: 98.6 F | RESPIRATION RATE: 18 BRPM | WEIGHT: 143.3 LBS

## 2021-09-10 DIAGNOSIS — K08.89 PAIN, DENTAL: Primary | ICD-10-CM

## 2021-09-10 PROCEDURE — 99284 EMERGENCY DEPT VISIT MOD MDM: CPT | Performed by: PHYSICIAN ASSISTANT

## 2021-09-10 PROCEDURE — 99282 EMERGENCY DEPT VISIT SF MDM: CPT

## 2021-09-10 RX ORDER — NAPROXEN 250 MG/1
250 TABLET ORAL
Qty: 20 TABLET | Refills: 0 | Status: SHIPPED | OUTPATIENT
Start: 2021-09-10 | End: 2021-10-04

## 2021-09-10 NOTE — ED PROVIDER NOTES
History  Chief Complaint   Patient presents with    Dental Pain     Pt reports he is to get his left molar removed, pt's dentist prescribed Clindamycin and pt has been taking it, but pain is worse     Patient presents to the ER for evaluation of left lower dental pain  The patient states that 8 days ago he was told he had to have his left lower molar removed  The patient states that his dentist put him on clindamycin which he has been taking and states that he has 2 days left of the antibiotic  The patient states that he has had persistent pain since the visit  States that yesterday he began taking tylenol for his pain  States that he has been taking 1g tylenol around every 4 hours while awake  States that he has also been using a topical anesthetic  States the the anesthetic completely relieves the pain for a few hours however the pain returns when it wears off  The patient denies any new dental trauma  Denies any immunocompromising risk factors  Denies any fevers, facial swelling, difficulty swallowing, difficulty breathing or any other concerning symptoms  None       History reviewed  No pertinent past medical history  Past Surgical History:   Procedure Laterality Date    CHOLECYSTECTOMY         Family History   Problem Relation Age of Onset    Hyperthyroidism Mother     Colon cancer Neg Hx      I have reviewed and agree with the history as documented  E-Cigarette/Vaping     E-Cigarette/Vaping Substances     Social History     Tobacco Use    Smoking status: Current Some Day Smoker    Smokeless tobacco: Never Used    Tobacco comment: about 2 cigarettes max every couple of days   Substance Use Topics    Alcohol use: Yes     Comment: occassional    Drug use: Yes     Frequency: 3 0 times per week     Types: Marijuana       Review of Systems   Constitutional: Negative for chills and fever  HENT: Negative for congestion, facial swelling and sore throat      Respiratory: Negative for shortness of breath  Cardiovascular: Negative for chest pain  Gastrointestinal: Negative for abdominal pain, diarrhea, nausea and vomiting  Musculoskeletal: Negative for back pain  Skin: Negative for rash  Neurological: Negative for headaches  All other systems reviewed and are negative  Physical Exam  Physical Exam  Constitutional:       General: He is not in acute distress  Appearance: He is well-developed  HENT:      Head: Normocephalic and atraumatic  Comments: No facial swelling     Nose: Nose normal       Mouth/Throat:      Pharynx: No oropharyngeal exudate or posterior oropharyngeal erythema  Comments: Tooth fracture of left lower molar  No trismus  Eyes:      Conjunctiva/sclera: Conjunctivae normal    Cardiovascular:      Rate and Rhythm: Normal rate  Pulmonary:      Effort: Pulmonary effort is normal    Abdominal:      Palpations: Abdomen is soft  Tenderness: There is no abdominal tenderness  There is no guarding  Musculoskeletal:         General: Normal range of motion  Cervical back: Normal range of motion  Skin:     General: Skin is warm  Capillary Refill: Capillary refill takes less than 2 seconds  Neurological:      Mental Status: He is alert and oriented to person, place, and time  Vital Signs  ED Triage Vitals [09/10/21 1451]   Temperature Pulse Respirations Blood Pressure SpO2   98 6 °F (37 °C) (!) 54 18 121/79 97 %      Temp Source Heart Rate Source Patient Position - Orthostatic VS BP Location FiO2 (%)   Oral Monitor -- -- --      Pain Score       Worst Possible Pain           Vitals:    09/10/21 1451   BP: 121/79   Pulse: (!) 54         Visual Acuity      ED Medications  Medications - No data to display    Diagnostic Studies  Results Reviewed     None                 No orders to display              Procedures  Procedures         ED Course                                           MDM     Patient with tooth fracture noted   No signs of abscess, facial swelling or trismus  Discussed with patient to continue antibiotics as prescribed  Discussed symptomatic treatment and strict return instructions  Patient in no acute distress throughout ER stay  Vitals stable and reassuring  Patient stable for discharge at this time  Reviewed plan with patient/family  Reviewed red flag symptoms and strict return instructions  Patient/family voiced understanding and agreement to plan  Patient/family had opportunity to ask questions and all questions were answered at bedside  Disposition  Final diagnoses:   Pain, dental     Time reflects when diagnosis was documented in both MDM as applicable and the Disposition within this note     Time User Action Codes Description Comment    9/10/2021  4:39 PM Karolyn Mireya Diann [[id number]] Pain, dental       ED Disposition     ED Disposition Condition Date/Time Comment    Discharge Stable Fri Sep 10, 2021  4:39 PM [address] discharge to home/self care  Follow-up Information     Follow up With Specialties Details Why [address] [facility] Emergency Department Emergency Medicine  If symptoms worsen [facility] [id number]  [address] [facility] Emergency Department, [address] [place] [address], [facility] [address] for Oral and Maxillofacial Surgery [facility]   As discussed call to schedule appointment for follow up with the dentist [address]           Discharge Medication List as of 9/10/2021  4:40 PM      START taking these medications    Details   naproxen (NAPROSYN) 250 mg tablet Take 1 tablet (250 mg total) by mouth 3 (three) times a day with meals, Starting Fri 9/10/2021, Normal           No discharge procedures on file      PDMP Review     None          ED Provider  Electronically Signed by           Mya Lambert YVONNE  09/10/21 1755

## 2021-09-10 NOTE — DISCHARGE INSTRUCTIONS
Return to the ER with any new or worsening of symptoms such as but not limited to increased pain, facial swelling, fevers, or any other concerning symptoms    Do not take the naproxen with advil/motrin/aleve/ibuprofen as they are in the same category of medication  Thank you for allowing us to be part of your care today

## 2021-10-04 ENCOUNTER — OFFICE VISIT (OUTPATIENT)
Dept: FAMILY MEDICINE CLINIC | Facility: CLINIC | Age: 36
End: 2021-10-04

## 2021-10-04 VITALS
DIASTOLIC BLOOD PRESSURE: 60 MMHG | BODY MASS INDEX: 20.72 KG/M2 | SYSTOLIC BLOOD PRESSURE: 116 MMHG | TEMPERATURE: 98 F | RESPIRATION RATE: 16 BRPM | HEIGHT: 72 IN | WEIGHT: 153 LBS | OXYGEN SATURATION: 98 % | HEART RATE: 84 BPM

## 2021-10-04 DIAGNOSIS — M54.50 ACUTE BILATERAL LOW BACK PAIN WITHOUT SCIATICA: Primary | ICD-10-CM

## 2021-10-04 DIAGNOSIS — Z23 ENCOUNTER FOR VACCINATION: ICD-10-CM

## 2021-10-04 PROBLEM — R07.9 CHEST PAIN: Status: RESOLVED | Noted: 2019-05-28 | Resolved: 2021-10-04

## 2021-10-04 PROBLEM — R30.0 DYSURIA: Status: RESOLVED | Noted: 2019-05-28 | Resolved: 2021-10-04

## 2021-10-04 PROCEDURE — 90686 IIV4 VACC NO PRSV 0.5 ML IM: CPT | Performed by: FAMILY MEDICINE

## 2021-10-04 PROCEDURE — 99213 OFFICE O/P EST LOW 20 MIN: CPT | Performed by: FAMILY MEDICINE

## 2021-10-04 PROCEDURE — 90471 IMMUNIZATION ADMIN: CPT | Performed by: FAMILY MEDICINE

## 2021-10-04 RX ORDER — CYCLOBENZAPRINE HCL 10 MG
10 TABLET ORAL 3 TIMES DAILY PRN
Qty: 30 TABLET | Refills: 0 | Status: SHIPPED | OUTPATIENT
Start: 2021-10-04 | End: 2022-01-10 | Stop reason: SDUPTHER

## 2021-10-04 RX ORDER — NAPROXEN 500 MG/1
500 TABLET ORAL 2 TIMES DAILY WITH MEALS
Qty: 10 TABLET | Refills: 0 | Status: SHIPPED | OUTPATIENT
Start: 2021-10-04 | End: 2022-01-10 | Stop reason: SDUPTHER

## 2022-01-10 ENCOUNTER — OFFICE VISIT (OUTPATIENT)
Dept: FAMILY MEDICINE CLINIC | Facility: CLINIC | Age: 37
End: 2022-01-10

## 2022-01-10 VITALS
DIASTOLIC BLOOD PRESSURE: 68 MMHG | TEMPERATURE: 97.8 F | WEIGHT: 149.1 LBS | HEART RATE: 70 BPM | BODY MASS INDEX: 20.19 KG/M2 | HEIGHT: 72 IN | OXYGEN SATURATION: 97 % | RESPIRATION RATE: 18 BRPM | SYSTOLIC BLOOD PRESSURE: 108 MMHG

## 2022-01-10 DIAGNOSIS — M54.6 CHRONIC MIDLINE THORACIC BACK PAIN: Primary | ICD-10-CM

## 2022-01-10 DIAGNOSIS — G89.29 CHRONIC MIDLINE THORACIC BACK PAIN: Primary | ICD-10-CM

## 2022-01-10 DIAGNOSIS — M54.50 ACUTE BILATERAL LOW BACK PAIN WITHOUT SCIATICA: ICD-10-CM

## 2022-01-10 PROCEDURE — 99213 OFFICE O/P EST LOW 20 MIN: CPT

## 2022-01-10 RX ORDER — CYCLOBENZAPRINE HCL 10 MG
10 TABLET ORAL 3 TIMES DAILY PRN
Qty: 30 TABLET | Refills: 0 | Status: SHIPPED | OUTPATIENT
Start: 2022-01-10 | End: 2022-05-26

## 2022-01-10 RX ORDER — NAPROXEN 500 MG/1
500 TABLET ORAL 2 TIMES DAILY WITH MEALS
Qty: 90 TABLET | Refills: 0 | Status: SHIPPED | OUTPATIENT
Start: 2022-01-10 | End: 2022-05-17 | Stop reason: ALTCHOICE

## 2022-01-10 RX ORDER — METHYLPREDNISOLONE 4 MG/1
TABLET ORAL
Qty: 21 EACH | Refills: 0 | Status: SHIPPED | OUTPATIENT
Start: 2022-01-10 | End: 2022-05-13

## 2022-01-10 NOTE — PROGRESS NOTES
Assessment/Plan:    Chronic midline thoracic back pain  -PT, continue prn naproxen & flexeril  -Start and complete medrol pack when pain is at its worst- avoid naproxen when taking medrol pack  -Reviewed dangers of street drugs  -Advised pt to call office when his pt is intolerable  Pt is agreeable & verbalized understanding       Diagnoses and all orders for this visit:    Chronic midline thoracic back pain  -     Ambulatory Referral to Comprehensive Spine Program; Future  -     cyclobenzaprine (FLEXERIL) 10 mg tablet; Take 1 tablet (10 mg total) by mouth 3 (three) times a day as needed for muscle spasms  -     methylPREDNISolone 4 MG tablet therapy pack; Use as directed on package    Acute bilateral low back pain without sciatica  -     naproxen (EC NAPROSYN) 500 MG EC tablet; Take 1 tablet (500 mg total) by mouth 2 (two) times a day with meals  -     cyclobenzaprine (FLEXERIL) 10 mg tablet; Take 1 tablet (10 mg total) by mouth 3 (three) times a day as needed for muscle spasms  -     methylPREDNISolone 4 MG tablet therapy pack; Use as directed on package          Subjective:      Patient ID: Ruy Cornelius is a 39 y o  male  Ruy Cornelius is a very pleasant 39 y o  male  He complains of mid thoracic back pain that started in October  It comes and goes  It is worse at night  He originally presented to the office for this on 10/4/21  He was prescribed as needed Flexeril and naproxen, and encouraged to try physical therapy  However he has not gone to physical therapy because he was not given the information to make the appointment  Reports that medications do help  He has also tried heat and ice packs and lidocaine cream which do not provide relief  He also reports that there have been times where the pain is so bad at night that he has taken a Percocet from his brother, which he obtains from the [employer] in [place]  The pain did subside for a few weeks  However it has now returned  Denies any red flag symptoms  The following portions of the patient's history were reviewed and updated as appropriate: allergies, current medications, past family history, past medical history, past social history, past surgical history and problem list     Review of Systems   Constitutional: Negative for chills and fever  HENT: Negative for ear pain and sore throat  Eyes: Negative for pain and visual disturbance  Respiratory: Negative for cough and shortness of breath  Cardiovascular: Negative for chest pain and palpitations  Gastrointestinal: Negative for abdominal pain and vomiting  Genitourinary: Negative for dysuria and hematuria  Musculoskeletal: Positive for back pain  Negative for arthralgias  Skin: Negative for color change and rash  Neurological: Negative for seizures and syncope  All other systems reviewed and are negative  Objective:      /68 (BP Location: Left arm, Patient Position: Sitting, Cuff Size: Standard)   Pulse 70   Temp 97 8 °F (36 6 °C) (Temporal)   Resp 18   Ht 6' (1 829 m)   Wt 67 6 kg (149 lb 1 6 oz)   SpO2 97%   BMI 20 22 kg/m²          Physical Exam  Vitals and nursing note reviewed  Constitutional:       General: He is not in acute distress  Appearance: He is normal weight  He is not ill-appearing, toxic-appearing or diaphoretic  HENT:      Head: Normocephalic and atraumatic  Right Ear: External ear normal       Left Ear: External ear normal       Nose: Nose normal    Eyes:      Conjunctiva/sclera: Conjunctivae normal    Cardiovascular:      Rate and Rhythm: Normal rate  Pulmonary:      Effort: Pulmonary effort is normal    Musculoskeletal:         General: Normal range of motion  Cervical back: Normal, normal range of motion and neck supple  Thoracic back: Normal       Lumbar back: Normal    Skin:     General: Skin is warm and dry     Neurological:      Mental Status: He is alert and oriented to person, place, and time  Mental status is at baseline  Psychiatric:         Mood and Affect: Mood normal          Behavior: Behavior normal          Thought Content:  Thought content normal          Judgment: Judgment normal

## 2022-01-10 NOTE — ASSESSMENT & PLAN NOTE
-PT, continue prn naproxen & flexeril  -Start and complete medrol pack when pain is at its worst- avoid naproxen when taking medrol pack  -Reviewed dangers of street drugs  -Advised pt to call office when his pt is intolerable     Pt is agreeable & verbalized understanding

## 2022-01-10 NOTE — PATIENT INSTRUCTIONS
Dolor de espalda   LO QUE NECESITA SABER:   El dolor de espalda es común  Usted puede tener dolor de espalda y espasmos musculares  Usted puede estar adolorido o sentir rigidez en orlin o ambos lados de la espalda  El dolor se puede propagar a la parte baja del cuerpo  Las condiciones que afectan la columna, las articulaciones, o los músculos pueden causar el dolor de espalda  Estos pueden incluir la artritis, estenosis de la emy dorsal (estrechamiento de la columna vertebral), tensión muscular o descomposición de los discos de la columna vertebral   INSTRUCCIONES SOBRE EL NATE HOSPITALARIA:   Llame al número de emergencias local (911 en los [place]) si:  · Usted tiene dolor de espalda intenso con dolor en el pecho  · Usted no puede controlar curry orina ni nenita deposiciones intestinales  · El dolor se vuelve tan intenso que lo incapacita para caminar  Regrese a la charo de emergencias si:  · Usted tiene dolor, entumecimiento o debilidad en kenna o en ambas piernas  · Usted tiene dolor de espalda intenso, náuseas y vómito  · Usted tiene un dolor de espalda intenso que se propaga a un costado o al área genital     Llame a curry médico si:  · Usted tiene dolor de espalda que no mejora con el reposo, ni con el medicamento para el dolor  · Tiene fiebre  · Usted tiene un dolor que empeora cuando está acostado boca Jacob Cantrell o al descansar  · Usted tiene dolor que empeora cuando tose o estornuda  · Usted pierde peso sin proponérselo  · Usted tiene preguntas o inquietudes acerca de curry condición o cuidado  Medicamentos: Es posible que usted necesite alguno de los siguientes:  · [place], elijah el ibuprofeno, [facility] a disminuir la inflamación, el dolor y la [place]  [place] medicamento está disponible con o sin kenna receta médica  Los DARA pueden causar sangrado estomacal o problemas renales en ciertas personas   Si usted axel un medicamento anticoagulante, siempre pregúntele a curry médico si los DARA son seguros para usted  Siempre camila la etiqueta de tia medicamento y [place] instrucciones  · Acetaminofén imelda el dolor y baja la fiebre  Está disponible sin receta médica  Pregunte la cantidad y la frecuencia con que debe tomarlos  [id number]  Camila las etiquetas de todos los demás medicamentos que esté usando para saber si también contienen acetaminofén, o pregunte a curry médico o farmacéutico  El acetaminofén puede causar daño en el hígado cuando no se axel de forma correcta  No use más de 4 gramos (4000 miligramos) en total de acetaminofeno en un día  · Relajantes musculares ayudan a disminuir los espasmos musculares y el dolor de espalda  · Puede administrarse podrían administrarse  Pregunte al médico cómo debe maggy tia medicamento de forma lundy  Algunos medicamentos recetados para el dolor contienen acetaminofén  No tome otros medicamentos que contengan acetaminofén sin consultarlo con curry médico  Demasiado acetaminofeno puede causar daño al hígado  Los medicamentos recetados para el dolor podrían causar estreñimiento  Pregunte a curry médico elijah prevenir o tratar estreñimiento  · [place] nenita medicamentos elijah se le haya indicado  Consulte con curry médico si usted doug que curry medicamento no le está ayudando o si presenta efectos secundarios  Infórmele si es alérgico a cualquier medicamento  Mantenga kenna lista actualizada de los [place], las vitaminas y los productos herbales que axel  Incluya los siguientes datos de los medicamentos: cantidad, frecuencia y motivo de administración  Traiga con usted la lista o los envases de las píldoras a nenita citas de seguimiento  Lleve la lista de los medicamentos con usted en mary de kenna emergencia  La forma de controlar curry dolor de espalda:  · Aplique hielo en la espalda de 15 a 20 minutos cada hora o elijah se le indique   Use kenna compresa de hielo o ponga hielo triturado en kenna bolsa de plástico  Judeen Siddharth con kenna toalla antes de aplicarlo sobre curry piel  El hielo disminuye el dolor y ayuda a evitar el daño en los tejidos  · Aplique calor en la espalda de 20 a 30 minutos cada 2 horas por los [employer] indiquen  El calor ayuda a disminuir el dolor y los espasmos musculares  · Manténgase activo lo más que pueda sin causar [employer]  El reposo en cama puede empeorar curry dolor de espalda  Evite levantar objetos hasta que ya no tenga dolor  · Vaya a terapia física según indicaciones  Un fisioterapeuta puede enseñarle ejercicios que contribuyan a mejorar curry movimiento y fuerza, y a aliviar el dolor  Acuda a kenna consulta de control con curry médico dentro de 2 semanas, o según le hayan indicado: Es posible que necesite fabiola a un especialista  Anote nenita preguntas para que se acuerde de hacerlas milena nenita visitas  © Copyright [employer] 2021 Information is for End User's use only and may not be sold, redistributed or otherwise used for commercial purposes  All illustrations and images included in CareNotes® are the copyrighted property of A D A M , Inc  or [address] [place] [address] es sólo para uso en educación  Curry intención no es darle un consejo médico sobre enfermedades o tratamientos  Colsulte con curry Vee Geovanni farmacéutico antes de seguir cualquier régimen médico para saber si es seguro y efectivo para usted

## 2022-01-12 ENCOUNTER — TELEPHONE (OUTPATIENT)
Dept: PHYSICAL THERAPY | Facility: OTHER | Age: 37
End: 2022-01-12

## 2022-02-07 ENCOUNTER — OFFICE VISIT (OUTPATIENT)
Dept: FAMILY MEDICINE CLINIC | Facility: CLINIC | Age: 37
End: 2022-02-07

## 2022-02-07 VITALS
BODY MASS INDEX: 19.83 KG/M2 | WEIGHT: 146.4 LBS | SYSTOLIC BLOOD PRESSURE: 114 MMHG | OXYGEN SATURATION: 98 % | HEART RATE: 81 BPM | TEMPERATURE: 96.9 F | DIASTOLIC BLOOD PRESSURE: 66 MMHG | HEIGHT: 72 IN | RESPIRATION RATE: 18 BRPM

## 2022-02-07 DIAGNOSIS — Z02.4 DRIVER'S PERMIT PE (PHYSICAL EXAMINATION): Primary | ICD-10-CM

## 2022-02-07 PROCEDURE — 99213 OFFICE O/P EST LOW 20 MIN: CPT

## 2022-02-07 NOTE — PROGRESS NOTES
Assessment/Plan:    's permit PE (physical examination)  Here today for a 's permit physical   Denies any history of seizures, neuro dx,  neuropsych dx, syncope, cardiac issues, htn, drug/ alcohol abuse, diabetes, physical disability and any conditions that cause a lapse of consciousness              Diagnoses and all orders for this visit:    's permit PE (physical examination)          Subjective:      Patient ID: Marilia Burdick is a 39 y o  male  Pt presents today for a DMV physical  Denies any acute compliants  The following portions of the patient's history were reviewed and updated as appropriate: allergies, current medications, past family history, past medical history, past social history, past surgical history and problem list     Review of Systems   Constitutional: Negative for chills and fever  HENT: Negative for ear pain and sore throat  Eyes: Negative for pain and visual disturbance  Respiratory: Negative for cough and shortness of breath  Cardiovascular: Negative for chest pain and palpitations  Gastrointestinal: Negative for abdominal pain and vomiting  Genitourinary: Negative for dysuria and hematuria  Musculoskeletal: Negative for arthralgias and back pain  Skin: Negative for color change and rash  Neurological: Negative for seizures and syncope  All other systems reviewed and are negative  Objective:      /66 (BP Location: Right arm, Patient Position: Sitting, Cuff Size: Standard)   Pulse 81   Temp (!) 96 9 °F (36 1 °C) (Temporal)   Resp 18   Ht 6' (1 829 m)   Wt 66 4 kg (146 lb 6 4 oz)   SpO2 98%   BMI 19 86 kg/m²          Physical Exam  Vitals and nursing note reviewed  Constitutional:       General: He is not in acute distress  Appearance: He is not ill-appearing  HENT:      Head: Normocephalic and atraumatic  Right Ear: External ear normal  There is no impacted cerumen        Left Ear: External ear normal  There is no impacted cerumen  Nose: Nose normal  No congestion  Eyes:      Pupils: Pupils are equal, round, and reactive to light  Cardiovascular:      Rate and Rhythm: Normal rate and regular rhythm  Pulses: Normal pulses  Heart sounds: Normal heart sounds  No murmur heard  Pulmonary:      Effort: Pulmonary effort is normal       Breath sounds: Normal breath sounds  Abdominal:      General: Bowel sounds are normal       Palpations: Abdomen is soft  Tenderness: There is no abdominal tenderness  Musculoskeletal:         General: No tenderness  Normal range of motion  Cervical back: Normal range of motion  No tenderness  Skin:     General: Skin is warm and dry  Neurological:      General: No focal deficit present  Mental Status: He is alert and oriented to person, place, and time  Psychiatric:         Mood and Affect: Mood normal          Behavior: Behavior normal          Thought Content:  Thought content normal          Judgment: Judgment normal             Visual Acuity Screening    Right eye Left eye Both eyes   Without correction:      With correction: 20/20 20/20 20/20

## 2022-02-07 NOTE — ASSESSMENT & PLAN NOTE
Here today for a 's permit physical   Denies any history of seizures, neuro dx,  neuropsych dx, syncope, cardiac issues, htn, drug/ alcohol abuse, diabetes, physical disability and any conditions that cause a lapse of consciousness  Noe Manifold

## 2022-03-22 ENCOUNTER — OFFICE VISIT (OUTPATIENT)
Dept: FAMILY MEDICINE CLINIC | Facility: CLINIC | Age: 37
End: 2022-03-22

## 2022-03-22 VITALS
HEIGHT: 72 IN | WEIGHT: 150.9 LBS | BODY MASS INDEX: 20.44 KG/M2 | OXYGEN SATURATION: 98 % | TEMPERATURE: 97.6 F | DIASTOLIC BLOOD PRESSURE: 62 MMHG | SYSTOLIC BLOOD PRESSURE: 100 MMHG | HEART RATE: 87 BPM | RESPIRATION RATE: 18 BRPM

## 2022-03-22 DIAGNOSIS — M54.6 CHRONIC MIDLINE THORACIC BACK PAIN: Primary | ICD-10-CM

## 2022-03-22 DIAGNOSIS — G89.29 CHRONIC MIDLINE THORACIC BACK PAIN: Primary | ICD-10-CM

## 2022-03-22 PROCEDURE — 99213 OFFICE O/P EST LOW 20 MIN: CPT | Performed by: FAMILY MEDICINE

## 2022-03-22 NOTE — PROGRESS NOTES
Assessment/Plan:    Chronic midline thoracic back pain  Pain has completely subsided and pt is no longer endorsing back trouble at this time  Plan  - continue with home exercises as tolerated       Diagnoses and all orders for this visit:    Chronic midline thoracic back pain          Subjective:      Patient ID: Oneil Ortiz is a 39 y o  male  Pt comes in for f/u of back pain  Pt states that back pain has completely resolved  Has been doing back exercises that have really helped  Has not attempted to see PT or spine center as initially instructed  Pain 0/10  No longer pain with sleeping or with activity  Pt also endorsing bump on back of head which he states causes pain during certain seasons  Also notes "blackheads on legs" which hurt when he tries to squeeze them  Denying fevers, previous skin infection, and shaving of affected areas  The following portions of the patient's history were reviewed and updated as appropriate: allergies, current medications, past family history, past medical history, past social history, past surgical history and problem list     Review of Systems   Constitutional: Negative for chills and fever  All these positive symptoms occurred after receiving news of a close friend's passing   HENT: Negative for ear pain and sore throat  Eyes: Negative for pain and visual disturbance  Respiratory: Negative for cough and shortness of breath  Cardiovascular: Positive for palpitations  Negative for chest pain  Gastrointestinal: Positive for nausea and vomiting  Negative for abdominal pain, blood in stool, constipation and diarrhea  Genitourinary: Negative for dysuria and hematuria  Musculoskeletal: Negative for arthralgias and back pain  Skin: Negative for color change and rash  Neurological: Positive for syncope  Negative for seizures  All other systems reviewed and are negative          Objective:      /62 (BP Location: Left arm, Patient Position: Sitting, Cuff Size: Standard)   Pulse 87   Temp 97 6 °F (36 4 °C) (Temporal)   Resp 18   Ht 6' (1 829 m)   Wt 68 4 kg (150 lb 14 4 oz)   SpO2 98%   BMI 20 47 kg/m²          Physical Exam  Vitals and nursing note reviewed  Constitutional:       General: He is not in acute distress  Appearance: Normal appearance  He is not ill-appearing, toxic-appearing or diaphoretic  HENT:      Head: Normocephalic and atraumatic  Comments: No "bump" or erythema noted on examination  Eyes:      General: No scleral icterus  Right eye: No discharge  Left eye: No discharge  Extraocular Movements: Extraocular movements intact  Conjunctiva/sclera: Conjunctivae normal    Cardiovascular:      Rate and Rhythm: Normal rate and regular rhythm  Pulses: Normal pulses  Heart sounds: Normal heart sounds  No murmur heard  No friction rub  No gallop  Pulmonary:      Effort: Pulmonary effort is normal  No respiratory distress  Breath sounds: Normal breath sounds  No stridor  No wheezing, rhonchi or rales  Abdominal:      General: There is no distension  Palpations: Abdomen is soft  Tenderness: There is no abdominal tenderness  There is no guarding or rebound  Musculoskeletal:         General: No swelling or tenderness  Cervical back: Normal range of motion  Right lower leg: No edema  Left lower leg: No edema  Skin:     General: Skin is warm and dry  Capillary Refill: Capillary refill takes less than 2 seconds  Coloration: Skin is not jaundiced  Comments: No edema, erythema, or pain to palpation was noted on skin of thighs where pt states is where he develops the "blackheads"    Neurological:      General: No focal deficit present  Mental Status: He is alert     Psychiatric:         Mood and Affect: Mood normal          Behavior: Behavior normal

## 2022-03-22 NOTE — ASSESSMENT & PLAN NOTE
Pain has completely subsided and pt is no longer endorsing back trouble at this time      Plan  - continue with home exercises as tolerated

## 2022-04-19 ENCOUNTER — TELEPHONE (OUTPATIENT)
Dept: FAMILY MEDICINE CLINIC | Facility: CLINIC | Age: 37
End: 2022-04-19

## 2022-04-19 DIAGNOSIS — Z11.4 ENCOUNTER FOR SCREENING FOR HIV: ICD-10-CM

## 2022-04-19 DIAGNOSIS — Z00.00 ENCOUNTER FOR ANNUAL PHYSICAL EXAM: Primary | ICD-10-CM

## 2022-04-19 NOTE — TELEPHONE ENCOUNTER
Called pt regarding lab test request  He was to be scheduled for annual physical and not f/u of back pain  Pt states he was told he was ordered to get blood tests however I explained that usually during the annual physical visit was when I would place order for tests and not before  Will now order CBC, CMP, Lipid Panel, and HIV screening with pt's agreement  Pt to be scheduled for annual physical to review results and make medication adjustment if needed

## 2022-04-19 NOTE — TELEPHONE ENCOUNTER
Patient is requesting lab order before he comes to the appt  Pt stated that the last office visit provider was going to give him lab orderbut there is nothing in his chart       Please adviseother

## 2022-04-24 ENCOUNTER — APPOINTMENT (INPATIENT)
Dept: RADIOLOGY | Facility: HOSPITAL | Age: 37
DRG: 135 | End: 2022-04-24
Payer: COMMERCIAL

## 2022-04-24 ENCOUNTER — HOSPITAL ENCOUNTER (INPATIENT)
Facility: HOSPITAL | Age: 37
LOS: 9 days | Discharge: HOME | DRG: 135 | End: 2022-05-03
Attending: SURGERY | Admitting: SURGERY
Payer: COMMERCIAL

## 2022-04-24 ENCOUNTER — APPOINTMENT (INPATIENT)
Dept: CT IMAGING | Facility: HOSPITAL | Age: 37
DRG: 135 | End: 2022-04-24
Payer: COMMERCIAL

## 2022-04-24 ENCOUNTER — APPOINTMENT (EMERGENCY)
Dept: CT IMAGING | Facility: HOSPITAL | Age: 37
DRG: 135 | End: 2022-04-24
Payer: COMMERCIAL

## 2022-04-24 DIAGNOSIS — S02.85XA CLOSED FRACTURE OF LEFT ORBIT, INITIAL ENCOUNTER (HCC): ICD-10-CM

## 2022-04-24 DIAGNOSIS — S83.105A LEFT KNEE DISLOCATION, INITIAL ENCOUNTER: ICD-10-CM

## 2022-04-24 DIAGNOSIS — T14.90XA TRAUMA: ICD-10-CM

## 2022-04-24 DIAGNOSIS — S82.142A CLOSED FRACTURE OF LEFT TIBIAL PLATEAU, INITIAL ENCOUNTER: Primary | ICD-10-CM

## 2022-04-24 DIAGNOSIS — T07.XXXA MULTIPLE LACERATIONS: ICD-10-CM
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
## 2022-04-24 PROBLEM — G89.11 ACUTE PAIN DUE TO TRAUMA: Status: ACTIVE | Noted: 2022-04-24

## 2022-04-24 PROBLEM — S27.0XXA TRAUMATIC PNEUMOTHORAX: Status: ACTIVE | Noted: 2022-04-24

## 2022-04-24 PROBLEM — S92.302A CLOSED FRACTURE OF METATARSAL BONE OF LEFT FOOT: Status: ACTIVE | Noted: 2022-04-24

## 2022-04-24 LAB
ABO GROUP BLD: NORMAL
ABO GROUP BLD: NORMAL
ALBUMIN SERPL BCP-MCNC: 4.1 G/DL (ref 3.5–5)
ALP SERPL-CCNC: 45 U/L (ref 46–116)
ALT SERPL W P-5'-P-CCNC: 53 U/L (ref 12–78)
ANION GAP SERPL CALCULATED.3IONS-SCNC: 13 MMOL/L (ref 4–13)
APTT PPP: 28 SECONDS (ref 23–37)
AST SERPL W P-5'-P-CCNC: 33 U/L (ref 5–45)
BASE EXCESS BLDA CALC-SCNC: -1 MMOL/L (ref -2–3)
BASOPHILS # BLD AUTO: 0.03 THOUSANDS/ΜL (ref 0–0.1)
BASOPHILS NFR BLD AUTO: 0 % (ref 0–1)
BILIRUB SERPL-MCNC: 1.32 MG/DL (ref 0.2–1)
BLD GP AB SCN SERPL QL: NEGATIVE
BUN SERPL-MCNC: 13 MG/DL (ref 5–25)
CA-I BLD-SCNC: 1.14 MMOL/L (ref 1.12–1.32)
CALCIUM SERPL-MCNC: 8.5 MG/DL (ref 8.3–10.1)
CHLORIDE SERPL-SCNC: 105 MMOL/L (ref 100–108)
CO2 SERPL-SCNC: 22 MMOL/L (ref 21–32)
CREAT SERPL-MCNC: 1.3 MG/DL (ref 0.6–1.3)
EOSINOPHIL # BLD AUTO: 0.04 THOUSAND/ΜL (ref 0–0.61)
EOSINOPHIL NFR BLD AUTO: 1 % (ref 0–6)
ERYTHROCYTE [DISTWIDTH] IN BLOOD BY AUTOMATED COUNT: 12.4 % (ref 11.6–15.1)
GFR SERPL CREATININE-BSD FRML MDRD: 69 ML/MIN/1.73SQ M
GLUCOSE SERPL-MCNC: 149 MG/DL (ref 65–140)
GLUCOSE SERPL-MCNC: 154 MG/DL (ref 65–140)
HCO3 BLDA-SCNC: 22.1 MMOL/L (ref 24–30)
HCT VFR BLD AUTO: 42.6 % (ref 36.5–49.3)
HCT VFR BLD CALC: 44 % (ref 36.5–49.3)
HGB BLD-MCNC: 14.9 G/DL (ref 12–17)
HGB BLDA-MCNC: 15 G/DL (ref 12–17)
IMM GRANULOCYTES # BLD AUTO: 0.08 THOUSAND/UL (ref 0–0.2)
IMM GRANULOCYTES NFR BLD AUTO: 1 % (ref 0–2)
INR PPP: 1.14 (ref 0.84–1.19)
LACTATE SERPL-SCNC: 1.6 MMOL/L (ref 0.5–2)
LYMPHOCYTES # BLD AUTO: 2.07 THOUSANDS/ΜL (ref 0.6–4.47)
LYMPHOCYTES NFR BLD AUTO: 25 % (ref 14–44)
MCH RBC QN AUTO: 31.5 PG (ref 26.8–34.3)
MCHC RBC AUTO-ENTMCNC: 35 G/DL (ref 31.4–37.4)
MCV RBC AUTO: 90 FL (ref 82–98)
MONOCYTES # BLD AUTO: 0.41 THOUSAND/ΜL (ref 0.17–1.22)
MONOCYTES NFR BLD AUTO: 5 % (ref 4–12)
NEUTROPHILS # BLD AUTO: 5.73 THOUSANDS/ΜL (ref 1.85–7.62)
NEUTS SEG NFR BLD AUTO: 68 % (ref 43–75)
NRBC BLD AUTO-RTO: 0 /100 WBCS
PCO2 BLD: 23 MMOL/L (ref 21–32)
PCO2 BLD: 32.2 MM HG (ref 42–50)
PH BLD: 7.45 [PH] (ref 7.3–7.4)
PLATELET # BLD AUTO: 218 THOUSANDS/UL (ref 149–390)
PMV BLD AUTO: 10.5 FL (ref 8.9–12.7)
PO2 BLD: 47 MM HG (ref 35–45)
POTASSIUM BLD-SCNC: 3.9 MMOL/L (ref 3.5–5.3)
POTASSIUM SERPL-SCNC: 3.9 MMOL/L (ref 3.5–5.3)
PROT SERPL-MCNC: 7.2 G/DL (ref 6.4–8.2)
PROTHROMBIN TIME: 14.6 SECONDS (ref 11.6–14.5)
RBC # BLD AUTO: 4.73 MILLION/UL (ref 3.88–5.62)
RH BLD: POSITIVE
RH BLD: POSITIVE
SAO2 % BLD FROM PO2: 85 % (ref 60–85)
SODIUM BLD-SCNC: 140 MMOL/L (ref 136–145)
SODIUM SERPL-SCNC: 140 MMOL/L (ref 136–145)
SPECIMEN EXPIRATION DATE: NORMAL
SPECIMEN SOURCE: ABNORMAL
WBC # BLD AUTO: 8.36 THOUSAND/UL (ref 4.31–10.16)

## 2022-04-24 PROCEDURE — NC001 PR NO CHARGE: Performed by: PHYSICIAN ASSISTANT

## 2022-04-24 PROCEDURE — 76604 US EXAM CHEST: CPT | Performed by: PHYSICIAN ASSISTANT

## 2022-04-24 PROCEDURE — 72125 CT NECK SPINE W/O DYE: CPT

## 2022-04-24 PROCEDURE — 86900 BLOOD TYPING SEROLOGIC ABO: CPT | Performed by: SURGERY

## 2022-04-24 PROCEDURE — 83605 ASSAY OF LACTIC ACID: CPT | Performed by: SURGERY

## 2022-04-24 PROCEDURE — 99223 1ST HOSP IP/OBS HIGH 75: CPT | Performed by: ORTHOPAEDIC SURGERY

## 2022-04-24 PROCEDURE — 73700 CT LOWER EXTREMITY W/O DYE: CPT

## 2022-04-24 PROCEDURE — 99285 EMERGENCY DEPT VISIT HI MDM: CPT

## 2022-04-24 PROCEDURE — G1004 CDSM NDSC: HCPCS

## 2022-04-24 PROCEDURE — 12032 INTMD RPR S/A/T/EXT 2.6-7.5: CPT | Performed by: PHYSICIAN ASSISTANT

## 2022-04-24 PROCEDURE — 96375 TX/PRO/DX INJ NEW DRUG ADDON: CPT

## 2022-04-24 PROCEDURE — 73630 X-RAY EXAM OF FOOT: CPT

## 2022-04-24 PROCEDURE — NC001 PR NO CHARGE: Performed by: EMERGENCY MEDICINE

## 2022-04-24 PROCEDURE — 76705 ECHO EXAM OF ABDOMEN: CPT | Performed by: PHYSICIAN ASSISTANT

## 2022-04-24 PROCEDURE — 84295 ASSAY OF SERUM SODIUM: CPT

## 2022-04-24 PROCEDURE — 85730 THROMBOPLASTIN TIME PARTIAL: CPT | Performed by: SURGERY

## 2022-04-24 PROCEDURE — 85610 PROTHROMBIN TIME: CPT | Performed by: SURGERY

## 2022-04-24 PROCEDURE — 84132 ASSAY OF SERUM POTASSIUM: CPT

## 2022-04-24 PROCEDURE — 85025 COMPLETE CBC W/AUTO DIFF WBC: CPT | Performed by: SURGERY

## 2022-04-24 PROCEDURE — 71260 CT THORAX DX C+: CPT

## 2022-04-24 PROCEDURE — 70450 CT HEAD/BRAIN W/O DYE: CPT

## 2022-04-24 PROCEDURE — 08QRXZZ REPAIR LEFT LOWER EYELID, EXTERNAL APPROACH: ICD-10-PCS | Performed by: PHYSICIAN ASSISTANT

## 2022-04-24 PROCEDURE — 12015 RPR F/E/E/N/L/M 7.6-12.5 CM: CPT | Performed by: SURGERY

## 2022-04-24 PROCEDURE — 73564 X-RAY EXAM KNEE 4 OR MORE: CPT

## 2022-04-24 PROCEDURE — 82947 ASSAY GLUCOSE BLOOD QUANT: CPT

## 2022-04-24 PROCEDURE — 73706 CT ANGIO LWR EXTR W/O&W/DYE: CPT

## 2022-04-24 PROCEDURE — 36415 COLL VENOUS BLD VENIPUNCTURE: CPT

## 2022-04-24 PROCEDURE — 86901 BLOOD TYPING SEROLOGIC RH(D): CPT | Performed by: SURGERY

## 2022-04-24 PROCEDURE — 93308 TTE F-UP OR LMTD: CPT | Performed by: PHYSICIAN ASSISTANT

## 2022-04-24 PROCEDURE — 13121 CMPLX RPR S/A/L 2.6-7.5 CM: CPT | Performed by: SURGERY

## 2022-04-24 PROCEDURE — 82803 BLOOD GASES ANY COMBINATION: CPT

## 2022-04-24 PROCEDURE — 90715 TDAP VACCINE 7 YRS/> IM: CPT | Performed by: PHYSICIAN ASSISTANT

## 2022-04-24 PROCEDURE — 85014 HEMATOCRIT: CPT

## 2022-04-24 PROCEDURE — 96365 THER/PROPH/DIAG IV INF INIT: CPT

## 2022-04-24 PROCEDURE — 74177 CT ABD & PELVIS W/CONTRAST: CPT

## 2022-04-24 PROCEDURE — 73590 X-RAY EXAM OF LOWER LEG: CPT

## 2022-04-24 PROCEDURE — 12015 RPR F/E/E/N/L/M 7.6-12.5 CM: CPT | Performed by: PHYSICIAN ASSISTANT

## 2022-04-24 PROCEDURE — 80053 COMPREHEN METABOLIC PANEL: CPT | Performed by: SURGERY

## 2022-04-24 PROCEDURE — 86850 RBC ANTIBODY SCREEN: CPT | Performed by: SURGERY

## 2022-04-24 PROCEDURE — 99222 1ST HOSP IP/OBS MODERATE 55: CPT | Performed by: SURGERY

## 2022-04-24 PROCEDURE — 82330 ASSAY OF CALCIUM: CPT

## 2022-04-24 PROCEDURE — 0HQLXZZ REPAIR LEFT LOWER LEG SKIN, EXTERNAL APPROACH: ICD-10-PCS | Performed by: PHYSICIAN ASSISTANT

## 2022-04-24 RX ORDER — LIDOCAINE HYDROCHLORIDE 10 MG/ML
15 INJECTION, SOLUTION EPIDURAL; INFILTRATION; INTRACAUDAL; PERINEURAL ONCE
Status: COMPLETED | OUTPATIENT
Start: 2022-04-24 | End: 2022-04-24

## 2022-04-24 RX ORDER — GABAPENTIN 100 MG/1
100 CAPSULE ORAL 3 TIMES DAILY
Status: DISCONTINUED | OUTPATIENT
Start: 2022-04-24 | End: 2022-05-03 | Stop reason: HOSPADM

## 2022-04-24 RX ORDER — POLYETHYLENE GLYCOL 3350 17 G/17G
17 POWDER, FOR SOLUTION ORAL DAILY
Status: DISCONTINUED | OUTPATIENT
Start: 2022-04-24 | End: 2022-05-03 | Stop reason: HOSPADM

## 2022-04-24 RX ORDER — FENTANYL CITRATE 50 UG/ML
25 INJECTION, SOLUTION INTRAMUSCULAR; INTRAVENOUS ONCE
Status: COMPLETED | OUTPATIENT
Start: 2022-04-24 | End: 2022-04-24

## 2022-04-24 RX ORDER — GINSENG 100 MG
1 CAPSULE ORAL ONCE
Status: COMPLETED | OUTPATIENT
Start: 2022-04-24 | End: 2022-04-24

## 2022-04-24 RX ORDER — AMOXICILLIN 250 MG
2 CAPSULE ORAL DAILY
Status: DISCONTINUED | OUTPATIENT
Start: 2022-04-24 | End: 2022-05-03 | Stop reason: HOSPADM

## 2022-04-24 RX ORDER — OXYCODONE HYDROCHLORIDE 10 MG/1
10 TABLET ORAL EVERY 4 HOURS PRN
Status: DISCONTINUED | OUTPATIENT
Start: 2022-04-24 | End: 2022-04-27

## 2022-04-24 RX ORDER — CEFAZOLIN SODIUM 2 G/50ML
2000 SOLUTION INTRAVENOUS ONCE
Status: COMPLETED | OUTPATIENT
Start: 2022-04-24 | End: 2022-04-24

## 2022-04-24 RX ORDER — HYDROMORPHONE HCL/PF 1 MG/ML
0.5 SYRINGE (ML) INJECTION
Status: DISCONTINUED | OUTPATIENT
Start: 2022-04-24 | End: 2022-04-26

## 2022-04-24 RX ORDER — CYCLOBENZAPRINE HCL 10 MG
10 TABLET ORAL 3 TIMES DAILY PRN
COMMUNITY
End: 2022-05-03 | Stop reason: HOSPADM

## 2022-04-24 RX ORDER — ONDANSETRON 2 MG/ML
4 INJECTION INTRAMUSCULAR; INTRAVENOUS EVERY 4 HOURS PRN
Status: DISCONTINUED | OUTPATIENT
Start: 2022-04-24 | End: 2022-05-03 | Stop reason: HOSPADM

## 2022-04-24 RX ORDER — ACETAMINOPHEN 325 MG/1
975 TABLET ORAL EVERY 8 HOURS SCHEDULED
Status: DISCONTINUED | OUTPATIENT
Start: 2022-04-24 | End: 2022-05-03 | Stop reason: HOSPADM

## 2022-04-24 RX ORDER — LIDOCAINE HYDROCHLORIDE 10 MG/ML
5 INJECTION, SOLUTION EPIDURAL; INFILTRATION; INTRACAUDAL; PERINEURAL ONCE
Status: COMPLETED | OUTPATIENT
Start: 2022-04-24 | End: 2022-04-24

## 2022-04-24 RX ORDER — OXYCODONE HYDROCHLORIDE 5 MG/1
5 TABLET ORAL EVERY 4 HOURS PRN
Status: DISCONTINUED | OUTPATIENT
Start: 2022-04-24 | End: 2022-04-27

## 2022-04-24 RX ORDER — FENTANYL CITRATE 50 UG/ML
1 INJECTION, SOLUTION INTRAMUSCULAR; INTRAVENOUS ONCE
Status: COMPLETED | OUTPATIENT
Start: 2022-04-24 | End: 2022-04-24

## 2022-04-24 RX ORDER — METHOCARBAMOL 500 MG/1
500 TABLET, FILM COATED ORAL EVERY 6 HOURS SCHEDULED
Status: DISCONTINUED | OUTPATIENT
Start: 2022-04-24 | End: 2022-04-26

## 2022-04-24 RX ADMIN — BACITRACIN ZINC 1 LARGE APPLICATION: 500 OINTMENT TOPICAL at 16:36

## 2022-04-24 RX ADMIN — CEFAZOLIN SODIUM 2000 MG: 2 SOLUTION INTRAVENOUS at 13:25

## 2022-04-24 RX ADMIN — FENTANYL CITRATE 25 MCG: 50 INJECTION INTRAMUSCULAR; INTRAVENOUS at 16:12

## 2022-04-24 RX ADMIN — LIDOCAINE HYDROCHLORIDE 5 ML: 10 INJECTION, SOLUTION EPIDURAL; INFILTRATION; INTRACAUDAL at 16:46

## 2022-04-24 RX ADMIN — GABAPENTIN 100 MG: 100 CAPSULE ORAL at 16:46

## 2022-04-24 RX ADMIN — ACETAMINOPHEN 975 MG: 325 TABLET ORAL at 14:38

## 2022-04-24 RX ADMIN — HYDROMORPHONE HYDROCHLORIDE 0.5 MG: 1 INJECTION, SOLUTION INTRAMUSCULAR; INTRAVENOUS; SUBCUTANEOUS at 21:21

## 2022-04-24 RX ADMIN — GABAPENTIN 100 MG: 100 CAPSULE ORAL at 20:51

## 2022-04-24 RX ADMIN — METHOCARBAMOL 500 MG: 500 TABLET ORAL at 14:38

## 2022-04-24 RX ADMIN — IODIXANOL 100 ML: 320 INJECTION, SOLUTION INTRAVASCULAR at 18:59

## 2022-04-24 RX ADMIN — ACETAMINOPHEN 975 MG: 325 TABLET ORAL at 21:26

## 2022-04-24 RX ADMIN — SENNOSIDES AND DOCUSATE SODIUM 2 TABLET: 50; 8.6 TABLET ORAL at 14:38

## 2022-04-24 RX ADMIN — IOHEXOL 100 ML: 350 INJECTION, SOLUTION INTRAVENOUS at 13:55

## 2022-04-24 RX ADMIN — HYDROMORPHONE HYDROCHLORIDE 0.5 MG: 1 INJECTION, SOLUTION INTRAMUSCULAR; INTRAVENOUS; SUBCUTANEOUS at 14:16

## 2022-04-24 RX ADMIN — LIDOCAINE HYDROCHLORIDE 15 ML: 10 INJECTION, SOLUTION EPIDURAL; INFILTRATION; INTRACAUDAL; PERINEURAL at 14:05

## 2022-04-24 RX ADMIN — FENTANYL CITRATE 25 MCG: 50 INJECTION, SOLUTION INTRAMUSCULAR; INTRAVENOUS at 13:20

## 2022-04-24 RX ADMIN — TETANUS TOXOID, REDUCED DIPHTHERIA TOXOID AND ACELLULAR PERTUSSIS VACCINE, ADSORBED 0.5 ML: 5; 2.5; 8; 8; 2.5 SUSPENSION INTRAMUSCULAR at 14:13

## 2022-04-24 RX ADMIN — OXYCODONE HYDROCHLORIDE 10 MG: 10 TABLET ORAL at 20:50

## 2022-04-24 RX ADMIN — ONDANSETRON 4 MG: 2 INJECTION INTRAMUSCULAR; INTRAVENOUS at 15:12

## 2022-04-24 RX ADMIN — POLYETHYLENE GLYCOL 3350 17 G: 17 POWDER, FOR SOLUTION ORAL at 14:41

## 2022-04-24 NOTE — ASSESSMENT & PLAN NOTE
- Acute comminuted angulated 1st left metatarsal shaft fracture with associated intra-articular fracture involving the base of the 1st metatarsal, moderately displaced fracture of the proximal 2nd metatarsal, and fracture through the 5th metatarsal head, present on admission   - Appreciate Orthopedic surgery evaluation and recommendations  - CT scan of the left lower extremity from 4/24/2022 reviewed  - Maintain NON-weightbearing status on the left lower extremity   - Monitor left lower extremity neurovascular exam   - Continue multimodal analgesic regimen   - Continue DVT prophylaxis  - PT and OT evaluation and treatment as indicated
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
## 2022-04-24 NOTE — PROCEDURES
Laceration repair    Date/Time: 4/24/2022 4:13 PM  Performed by: Marilu Bedolla PA-C  Authorized by: Marilu Bedolla PA-C   Consent: Verbal consent obtained  Risks and benefits: risks, benefits and alternatives were discussed  Consent given by: patient  Patient understanding: patient states understanding of the procedure being performed  Radiology Images displayed and confirmed  If images not available, report reviewed: imaging studies available  Required items: required blood products, implants, devices, and special equipment available  Patient identity confirmed: verbally with patient and arm band  Body area: head/neck  Location details: left eyebrow  Laceration length: 3 cm  Foreign bodies: no foreign bodies  Tendon involvement: none  Nerve involvement: none  Vascular damage: no  Anesthesia: local infiltration    Anesthesia:  Local Anesthetic: lidocaine 1% without epinephrine    Sedation:  Patient sedated: no        Procedure Details:  Preparation: Patient was prepped and draped in the usual sterile fashion    Irrigation solution: saline  Irrigation method: syringe  Debridement: none  Degree of undermining: none  Skin closure: 5-0 Prolene  Number of sutures: 6  Technique: simple  Approximation: close  Approximation difficulty: simple  Dressing: antibiotic ointment  Patient tolerance: patient tolerated the procedure well with no immediate complications
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
## 2022-04-24 NOTE — PROCEDURES
Laceration repair    Date/Time: 4/24/2022 4:20 PM  Performed by: Tita Howard PA-C  Authorized by: Tita Howard PA-C   Consent: Verbal consent obtained  Risks and benefits: risks, benefits and alternatives were discussed  Consent given by: patient  Patient understanding: patient states understanding of the procedure being performed  Radiology Images displayed and confirmed   If images not available, report reviewed: imaging studies available  Required items: required blood products, implants, devices, and special equipment available  Patient identity confirmed: verbally with patient and arm band  Body area: head/neck  Location details: left eyebrow  Laceration length: 0 5 cm  Foreign bodies: no foreign bodies  Tendon involvement: none  Nerve involvement: none  Vascular damage: no    Sedation:  Patient sedated: no        Procedure Details:  Irrigation solution: saline  Irrigation method: syringe  Amount of cleaning: standard  Debridement: none  Degree of undermining: none  Skin closure: glue  Approximation: close  Approximation difficulty: simple  Patient tolerance: patient tolerated the procedure well with no immediate complications

## 2022-04-24 NOTE — QUICK NOTE
I personally reviewed the available imaging of the patient's left lower extremity with the Orthopedic surgeon and discussed my concerns regarding the patient's potentially unstable knee when re-evaluating him and placing him in the immobilizer as well as to review the multiple left foot metatarsal fractures not previously discussed with the Orthopedic surgery team     Additional imaging including multi view x-rays of the left knee in the left foot had been obtained and reviewed by Orthopedic surgery  Orthopedic surgery also performed bedside evaluation the patient and agreed with the assessment that the patient has likely multiple ligament injury to the left knee  Treatment plan for orthopedic injuries remains nonweightbearing status on the left lower extremity with knee immobilizer for his left knee injuries as well as outpatient follow-up with Orthopedic surgery/sports medicine for anticipated operative intervention of his left knee ligamentous injuries  Patient also to be managed in a low-tide CAM boot for the left foot fractures with nonweightbearing status and non operatively upfront  Patient to follow up with Orthopedic surgery for re-evaluation and consideration for operative intervention of the left foot injuries  Patient and family updated      Amauri Walker PA-C  4/24/2022 05:58 PM

## 2022-04-24 NOTE — INCIDENTAL FINDINGS
The following findings require follow up:  Radiographic finding     Finding: One or more subcentimeter sharply circumscribed low-density hepatic lesion(s) are noted, too small to accurately characterize, but statistically most likely to represent subcentimeter hepatic cysts  No suspicious solid hepatic lesion is identified  A malignancy (cancer) cannot be excluded based on trauma imaging alone  Follow up required: Recommend outpatient follow up with PCP to review the finding and for surveillance or further work up as indicated       Follow up should be done within 2 week(s)    Please notify the following clinician to assist with the follow up:   Dr Winnie Wiseman

## 2022-04-24 NOTE — PROCEDURES
Laceration repair    Date/Time: 4/24/2022 4:18 PM  Performed by: Alessia Cameron PA-C  Authorized by: Alessia Cameron PA-C   Consent: Verbal consent obtained  Risks and benefits: risks, benefits and alternatives were discussed  Consent given by: patient  Patient understanding: patient states understanding of the procedure being performed  Radiology Images displayed and confirmed  If images not available, report reviewed: imaging studies available  Required items: required blood products, implants, devices, and special equipment available  Patient identity confirmed: verbally with patient and arm band  Body area: head/neck  Location details: forehead  Laceration length: 3 cm  Foreign bodies: no foreign bodies  Tendon involvement: none  Nerve involvement: none  Vascular damage: no    Sedation:  Patient sedated: no        Procedure Details:  Preparation: Patient was prepped and draped in the usual sterile fashion    Irrigation solution: saline  Irrigation method: syringe  Debridement: none  Degree of undermining: none  Skin closure: glue  Approximation: close  Approximation difficulty: simple  Patient tolerance: patient tolerated the procedure well with no immediate complications
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
## 2022-04-24 NOTE — PROCEDURES
Laceration repair    Date/Time: 4/24/2022 4:43 PM  Performed by: Smitha Alaniz PA-C  Authorized by: Smitha Alaniz PA-C   Consent: Verbal consent obtained  Risks and benefits: risks, benefits and alternatives were discussed  Consent given by: patient  Patient understanding: patient states understanding of the procedure being performed  Radiology Images displayed and confirmed  If images not available, report reviewed: imaging studies available  Required items: required blood products, implants, devices, and special equipment available  Patient identity confirmed: verbally with patient and arm band  Body area: lower extremity  Location details: left lower leg  Laceration length: 3 cm  Foreign bodies: no foreign bodies  Tendon involvement: none  Nerve involvement: none  Vascular damage: yes  Anesthesia: local infiltration    Anesthesia:  Local Anesthetic: lidocaine 1% without epinephrine  Anesthetic total: 5 mL    Sedation:  Patient sedated: no        Procedure Details:  Irrigation solution: saline  Irrigation method: syringe  Amount of cleaning: extensive  Debridement: minimal  Degree of undermining: minimal  Wound skin closure material used: 2-0 Nylon    Number of sutures: 5  Technique: vertical mattress  Approximation: close  Approximation difficulty: complex  Dressing: antibiotic ointment and 4x4 sterile gauze  Patient tolerance: patient tolerated the procedure well with no immediate complications

## 2022-04-24 NOTE — PROCEDURES
Laceration repair    Date/Time: 4/24/2022 5:45 PM  Performed by: Tulio Joel PA-C  Authorized by: Tulio Joel PA-C   Consent: Verbal consent obtained  Consent given by: patient  Patient understanding: patient states understanding of the procedure being performed  Patient identity confirmed: arm band  Time out: Immediately prior to procedure a "time out" was called to verify the correct patient, procedure, equipment, support staff and site/side marked as required    Body area: head/neck  Location details: left eyelid  Laceration length: 0 5 cm  Anesthesia: local infiltration    Anesthesia:  Local Anesthetic: lidocaine 1% without epinephrine    Wound Dehiscence:  Superficial Wound Dehiscence: simple closure      Procedure Details:  Irrigation solution: saline  Irrigation method: syringe  Amount of cleaning: standard  Skin closure: 4-0 Prolene  Number of sutures: 3  Technique: simple  Approximation: close  Approximation difficulty: simple  Dressing: antibiotic ointment

## 2022-04-24 NOTE — ASSESSMENT & PLAN NOTE
- Acute mildly displaced fracture of the left orbital floor, present on presentation   - Await OMS evaluation and recommendations  - Await Ophthalmology consultation   - Maintain sinus precautions  - Initiate multimodal analgesic regimen

## 2022-04-24 NOTE — ASSESSMENT & PLAN NOTE
- Lacerations to multiple sites including multiple facial lacerations as well as a left anterior lower leg laceration   - Plan for bedside washout and wound closure as indicated to facial lacerations as well as left lower extremity laceration   - Local wound care as indicated  - Analgesia as needed  - Update tetanus vaccination status

## 2022-04-24 NOTE — ASSESSMENT & PLAN NOTE
- Acute pain due to multiple traumatic injuries  - Initiate multimodal analgesic regimen  - Initiate bowel regimen  - Monitor pain and adjust regimen as indicated  - Consider consultation to APS if pain control not adequate
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
## 2022-04-24 NOTE — PROCEDURES
Laceration repair    Date/Time: 4/24/2022 4:22 PM  Performed by: Marc Cespedes PA-C  Authorized by: Marc Cespedes PA-C   Consent: Verbal consent obtained  Consent given by: patient  Patient understanding: patient states understanding of the procedure being performed  Radiology Images displayed and confirmed   If images not available, report reviewed: imaging studies available  Required items: required blood products, implants, devices, and special equipment available  Patient identity confirmed: arm band and verbally with patient  Body area: head/neck  Location details: left eyelid  Laceration length: 1 5 cm  Foreign bodies: no foreign bodies  Tendon involvement: none  Nerve involvement: none  Vascular damage: no    Sedation:  Patient sedated: no        Procedure Details:  Irrigation solution: saline  Irrigation method: syringe  Amount of cleaning: standard  Debridement: none  Degree of undermining: none  Skin closure: glue  Approximation: close  Approximation difficulty: simple  Patient tolerance: patient tolerated the procedure well with no immediate complications

## 2022-04-24 NOTE — ASSESSMENT & PLAN NOTE
- Acute nondisplaced left tibial plateau fracture, present on admission   - Await formal Orthopedic surgery evaluation and recommendations  Based on initial discussion with Orthopedic surgery, anticipate non operative management in knee immobilizer with outpatient follow-up  - Maintain NON-weightbearing status on the left lower extremity in knee immobilizer  - Monitor left lower extremity neurovascular exam   - Continue multimodal analgesic regimen   - Continue DVT prophylaxis  - PT and OT evaluation and treatment as indicated  - Outpatient follow up with Orthopedic surgery for re-evaluation
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
## 2022-04-24 NOTE — QUICK NOTE
Cervical Collar Clearance: The patient had a CT scan of the cervical spine demonstrating no acute injury  On exam, the patient had no midline point tenderness or paresthesias/numbness/weakness in the extremities  The patient had full range of motion (was then able to flex, extend, and rotate head laterally) without pain  There were no distracting injuries and the patient was not intoxicated  The patient's cervical spine was cleared radiologically and clinically  Cervical collar removed at this time       Tita Howard PA-C  4/24/2022 03:01 PM

## 2022-04-24 NOTE — ASSESSMENT & PLAN NOTE
- Right-sided pneumothorax, present on admission   - Management of chest wall contusion as noted  No obvious rib fractures noted  - Continue to encourage incentive spirometer use and adequate pulmonary hygiene  Patient with PIC score of 6 (1 / 2 / 3)  - Supplemental oxygen via nasal cannula as needed  On room air, no supplemental oxygen required  - Repeat chest x-ray on 4/25/2022 reviewed  - Outpatient follow-up in the trauma clinic for re-evaluation in approximately 2 weeks

## 2022-04-25 ENCOUNTER — APPOINTMENT (INPATIENT)
Dept: RADIOLOGY | Facility: HOSPITAL | Age: 37
DRG: 135 | End: 2022-04-25
Payer: COMMERCIAL

## 2022-04-25 LAB
ANION GAP SERPL CALCULATED.3IONS-SCNC: 10 MMOL/L (ref 4–13)
BUN SERPL-MCNC: 13 MG/DL (ref 5–25)
CALCIUM SERPL-MCNC: 8.3 MG/DL (ref 8.3–10.1)
CHLORIDE SERPL-SCNC: 103 MMOL/L (ref 100–108)
CO2 SERPL-SCNC: 23 MMOL/L (ref 21–32)
CREAT SERPL-MCNC: 1.09 MG/DL (ref 0.6–1.3)
ERYTHROCYTE [DISTWIDTH] IN BLOOD BY AUTOMATED COUNT: 12.8 % (ref 11.6–15.1)
GFR SERPL CREATININE-BSD FRML MDRD: 86 ML/MIN/1.73SQ M
GLUCOSE SERPL-MCNC: 91 MG/DL (ref 65–140)
HCT VFR BLD AUTO: 40.1 % (ref 36.5–49.3)
HGB BLD-MCNC: 13.2 G/DL (ref 12–17)
MCH RBC QN AUTO: 30.8 PG (ref 26.8–34.3)
MCHC RBC AUTO-ENTMCNC: 32.9 G/DL (ref 31.4–37.4)
MCV RBC AUTO: 94 FL (ref 82–98)
PLATELET # BLD AUTO: 176 THOUSANDS/UL (ref 149–390)
PMV BLD AUTO: 10.6 FL (ref 8.9–12.7)
POTASSIUM SERPL-SCNC: 3.7 MMOL/L (ref 3.5–5.3)
RBC # BLD AUTO: 4.29 MILLION/UL (ref 3.88–5.62)
SODIUM SERPL-SCNC: 136 MMOL/L (ref 136–145)
WBC # BLD AUTO: 8.63 THOUSAND/UL (ref 4.31–10.16)

## 2022-04-25 PROCEDURE — 99233 SBSQ HOSP IP/OBS HIGH 50: CPT | Performed by: PHYSICIAN ASSISTANT

## 2022-04-25 PROCEDURE — 97167 OT EVAL HIGH COMPLEX 60 MIN: CPT

## 2022-04-25 PROCEDURE — 71046 X-RAY EXAM CHEST 2 VIEWS: CPT

## 2022-04-25 PROCEDURE — 85027 COMPLETE CBC AUTOMATED: CPT | Performed by: PHYSICIAN ASSISTANT

## 2022-04-25 PROCEDURE — 80048 BASIC METABOLIC PNL TOTAL CA: CPT | Performed by: PHYSICIAN ASSISTANT

## 2022-04-25 PROCEDURE — 97163 PT EVAL HIGH COMPLEX 45 MIN: CPT

## 2022-04-25 PROCEDURE — 73564 X-RAY EXAM KNEE 4 OR MORE: CPT

## 2022-04-25 PROCEDURE — 99232 SBSQ HOSP IP/OBS MODERATE 35: CPT | Performed by: STUDENT IN AN ORGANIZED HEALTH CARE EDUCATION/TRAINING PROGRAM

## 2022-04-25 RX ADMIN — ENOXAPARIN SODIUM 30 MG: 30 INJECTION SUBCUTANEOUS at 17:06

## 2022-04-25 RX ADMIN — METHOCARBAMOL 500 MG: 500 TABLET ORAL at 17:06

## 2022-04-25 RX ADMIN — ACETAMINOPHEN 975 MG: 325 TABLET ORAL at 05:36

## 2022-04-25 RX ADMIN — HYDROMORPHONE HYDROCHLORIDE 0.5 MG: 1 INJECTION, SOLUTION INTRAMUSCULAR; INTRAVENOUS; SUBCUTANEOUS at 23:17

## 2022-04-25 RX ADMIN — OXYCODONE HYDROCHLORIDE 10 MG: 10 TABLET ORAL at 11:52

## 2022-04-25 RX ADMIN — HYDROMORPHONE HYDROCHLORIDE 0.5 MG: 1 INJECTION, SOLUTION INTRAMUSCULAR; INTRAVENOUS; SUBCUTANEOUS at 17:10

## 2022-04-25 RX ADMIN — OXYCODONE HYDROCHLORIDE 10 MG: 10 TABLET ORAL at 19:33

## 2022-04-25 RX ADMIN — HYDROMORPHONE HYDROCHLORIDE 0.5 MG: 1 INJECTION, SOLUTION INTRAMUSCULAR; INTRAVENOUS; SUBCUTANEOUS at 13:31

## 2022-04-25 RX ADMIN — OXYCODONE HYDROCHLORIDE 10 MG: 10 TABLET ORAL at 15:50

## 2022-04-25 RX ADMIN — GABAPENTIN 100 MG: 100 CAPSULE ORAL at 08:31

## 2022-04-25 RX ADMIN — SENNOSIDES AND DOCUSATE SODIUM 2 TABLET: 50; 8.6 TABLET ORAL at 08:31

## 2022-04-25 RX ADMIN — ENOXAPARIN SODIUM 30 MG: 30 INJECTION SUBCUTANEOUS at 05:36

## 2022-04-25 RX ADMIN — GABAPENTIN 100 MG: 100 CAPSULE ORAL at 15:50

## 2022-04-25 RX ADMIN — HYDROMORPHONE HYDROCHLORIDE 0.5 MG: 1 INJECTION, SOLUTION INTRAMUSCULAR; INTRAVENOUS; SUBCUTANEOUS at 21:03

## 2022-04-25 RX ADMIN — ACETAMINOPHEN 975 MG: 325 TABLET ORAL at 13:27

## 2022-04-25 RX ADMIN — METHOCARBAMOL 500 MG: 500 TABLET ORAL at 23:17

## 2022-04-25 RX ADMIN — OXYCODONE HYDROCHLORIDE 10 MG: 10 TABLET ORAL at 07:57

## 2022-04-25 RX ADMIN — METHOCARBAMOL 500 MG: 500 TABLET ORAL at 00:55

## 2022-04-25 RX ADMIN — OXYCODONE HYDROCHLORIDE 10 MG: 10 TABLET ORAL at 03:12

## 2022-04-25 RX ADMIN — GABAPENTIN 100 MG: 100 CAPSULE ORAL at 21:03

## 2022-04-25 RX ADMIN — ACETAMINOPHEN 975 MG: 325 TABLET ORAL at 21:03

## 2022-04-25 RX ADMIN — METHOCARBAMOL 500 MG: 500 TABLET ORAL at 11:52

## 2022-04-25 RX ADMIN — METHOCARBAMOL 500 MG: 500 TABLET ORAL at 05:36

## 2022-04-25 NOTE — ASSESSMENT & PLAN NOTE
- Acute mildly displaced fracture of the left orbital floor, present on presentation   - Appreciate OMS evaluation and recommendations  Non operative management recommended  - Await Ophthalmology consultation   - Maintain sinus precautions  - Initiate multimodal analgesic regimen   - Outpatient follow-up with OMS and Ophthalmology

## 2022-04-25 NOTE — PLAN OF CARE
Problem: Prexisting or High Potential for Compromised Skin Integrity  Goal: Skin integrity is maintained or improved  Description: INTERVENTIONS:  - Identify patients at risk for skin breakdown  - Assess and monitor skin integrity  - Assess and monitor nutrition and hydration status  - Monitor labs   - Assess for incontinence   - Turn and reposition patient  - Assist with mobility/ambulation  - Relieve pressure over bony prominences  - Avoid friction and shearing  - Provide appropriate hygiene as needed including keeping skin clean and dry  - Evaluate need for skin moisturizer/barrier cream  - Collaborate with interdisciplinary team   - Patient/family teaching  - Consider wound care consult   Outcome: Not Progressing     Problem: MOBILITY - ADULT  Goal: Maintain or return to baseline ADL function  Description: INTERVENTIONS:  -  Assess patient's ability to carry out ADLs; assess patient's baseline for ADL function and identify physical deficits which impact ability to perform ADLs (bathing, care of mouth/teeth, toileting, grooming, dressing, etc )  - Assess/evaluate cause of self-care deficits   - Assess range of motion  - Assess patient's mobility; develop plan if impaired  - Assess patient's need for assistive devices and provide as appropriate  - Encourage maximum independence but intervene and supervise when necessary  - Involve family in performance of ADLs  - Assess for home care needs following discharge   - Consider OT consult to assist with ADL evaluation and planning for discharge  - Provide patient education as appropriate  Outcome: Not Progressing  Goal: Maintains/Returns to pre admission functional level  Description: INTERVENTIONS:  - Perform BMAT or MOVE assessment daily    - Set and communicate daily mobility goal to care team and patient/family/caregiver     - Out of bed for toileting  - Record patient progress and toleration of activity level   Outcome: Not Progressing
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
## 2022-04-25 NOTE — ASSESSMENT & PLAN NOTE
- Acute nondisplaced left tibial plateau fracture, present on admission  Based on left knee instability and physical exam, patient likely to have ligamentous injuries  - Appreciate formal Orthopedic surgery evaluation and recommendations  Anticipate eventual operative intervention, likely as outpatient pending additional workup (MRI) when swelling improves  Do not anticipate operative intervention for tibial plateau, but patient likely to need surgical intervention for suspected ligamentous injuries in the left knee  - Maintain NON-weightbearing status on the left lower extremity in knee immobilizer  - Monitor left lower extremity neurovascular exam   - Continue multimodal analgesic regimen   - Continue DVT prophylaxis  - PT and OT evaluation and treatment as indicated  - Outpatient follow up with Orthopedic surgery for re-evaluation
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
## 2022-04-25 NOTE — ASSESSMENT & PLAN NOTE
- Right-sided pneumothorax, present on admission   - Management of chest wall contusion as noted  No obvious rib fractures noted  - Continue to encourage incentive spirometer use and adequate pulmonary hygiene  Patient with PIC score of 8 (2 / 3 / 3)  - Supplemental oxygen via nasal cannula as needed  On room air, no supplemental oxygen required  - Repeat chest x-ray on 4/25/2022 reviewed  - Outpatient follow-up in the trauma clinic for re-evaluation in approximately 2 weeks
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
## 2022-04-25 NOTE — QUICK NOTE
Patient seen and re-evaluated for new pain and swelling in the right knee  Patient and therapy staff noted that the right knee began to bother him about 30 minutes prior to evaluation by the therapy service  By the time therapy team had arrived, he developed some right knee swelling, but was still able to ambulate and bear weight through the right lower extremity to get to the chair  He notes that his right knee has remained swollen and painful since that time  He was able to again bear weight through the right lower extremity in order to get from the chair back to the bed  On exam, his right knee appears significantly swollen compared to his initial evaluation this morning and there was tenderness across the anterior right knee  The abrasions appear to be healing appropriately and the tenderness did not appear to be related to the abrasions  Due to significant traumatic mechanism and multiple traumatic injuries with new right knee pain and swelling, will obtain right knee x-rays to rule delayed presentation of underlying injury in setting of possible multiple distracting injuries  Further recommendations pending x-ray results and review      Meghna Larkin PA-C  4/25/2022 02:55 PM
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
## 2022-04-26 ENCOUNTER — APPOINTMENT (INPATIENT)
Dept: RADIOLOGY | Facility: HOSPITAL | Age: 37
DRG: 135 | End: 2022-04-26
Payer: COMMERCIAL

## 2022-04-26 ENCOUNTER — APPOINTMENT (INPATIENT)
Dept: CT IMAGING | Facility: HOSPITAL | Age: 37
DRG: 135 | End: 2022-04-26
Payer: COMMERCIAL

## 2022-04-26 PROBLEM — M25.561 ACUTE PAIN OF RIGHT KNEE: Status: ACTIVE | Noted: 2022-04-26

## 2022-04-26 LAB
ANION GAP SERPL CALCULATED.3IONS-SCNC: 10 MMOL/L (ref 4–13)
BUN SERPL-MCNC: 16 MG/DL (ref 5–25)
CALCIUM SERPL-MCNC: 8.2 MG/DL (ref 8.3–10.1)
CHLORIDE SERPL-SCNC: 98 MMOL/L (ref 100–108)
CO2 SERPL-SCNC: 23 MMOL/L (ref 21–32)
CREAT SERPL-MCNC: 1.25 MG/DL (ref 0.6–1.3)
ERYTHROCYTE [DISTWIDTH] IN BLOOD BY AUTOMATED COUNT: 12.1 % (ref 11.6–15.1)
GFR SERPL CREATININE-BSD FRML MDRD: 73 ML/MIN/1.73SQ M
GLUCOSE SERPL-MCNC: 170 MG/DL (ref 65–140)
HCT VFR BLD AUTO: 29.4 % (ref 36.5–49.3)
HGB BLD-MCNC: 10.4 G/DL (ref 12–17)
MCH RBC QN AUTO: 32.5 PG (ref 26.8–34.3)
MCHC RBC AUTO-ENTMCNC: 35.4 G/DL (ref 31.4–37.4)
MCV RBC AUTO: 92 FL (ref 82–98)
PLATELET # BLD AUTO: 172 THOUSANDS/UL (ref 149–390)
PMV BLD AUTO: 10.4 FL (ref 8.9–12.7)
POTASSIUM SERPL-SCNC: 4 MMOL/L (ref 3.5–5.3)
RBC # BLD AUTO: 3.2 MILLION/UL (ref 3.88–5.62)
SODIUM SERPL-SCNC: 131 MMOL/L (ref 136–145)
WBC # BLD AUTO: 11.39 THOUSAND/UL (ref 4.31–10.16)

## 2022-04-26 PROCEDURE — 87040 BLOOD CULTURE FOR BACTERIA: CPT | Performed by: PHYSICIAN ASSISTANT

## 2022-04-26 PROCEDURE — 73701 CT LOWER EXTREMITY W/DYE: CPT

## 2022-04-26 PROCEDURE — 20610 DRAIN/INJ JOINT/BURSA W/O US: CPT | Performed by: PHYSICIAN ASSISTANT

## 2022-04-26 PROCEDURE — G1004 CDSM NDSC: HCPCS

## 2022-04-26 PROCEDURE — 99232 SBSQ HOSP IP/OBS MODERATE 35: CPT | Performed by: STUDENT IN AN ORGANIZED HEALTH CARE EDUCATION/TRAINING PROGRAM

## 2022-04-26 PROCEDURE — 85027 COMPLETE CBC AUTOMATED: CPT | Performed by: PHYSICIAN ASSISTANT

## 2022-04-26 PROCEDURE — 80048 BASIC METABOLIC PNL TOTAL CA: CPT | Performed by: PHYSICIAN ASSISTANT

## 2022-04-26 PROCEDURE — 99233 SBSQ HOSP IP/OBS HIGH 50: CPT | Performed by: PHYSICIAN ASSISTANT

## 2022-04-26 PROCEDURE — 71046 X-RAY EXAM CHEST 2 VIEWS: CPT

## 2022-04-26 RX ORDER — LIDOCAINE 50 MG/G
1 PATCH TOPICAL DAILY
Status: DISCONTINUED | OUTPATIENT
Start: 2022-04-26 | End: 2022-05-03 | Stop reason: HOSPADM

## 2022-04-26 RX ORDER — METHOCARBAMOL 750 MG/1
750 TABLET, FILM COATED ORAL EVERY 6 HOURS SCHEDULED
Status: DISCONTINUED | OUTPATIENT
Start: 2022-04-26 | End: 2022-05-03 | Stop reason: HOSPADM

## 2022-04-26 RX ORDER — LIDOCAINE HYDROCHLORIDE 10 MG/ML
10 INJECTION, SOLUTION EPIDURAL; INFILTRATION; INTRACAUDAL; PERINEURAL ONCE
Status: DISCONTINUED | OUTPATIENT
Start: 2022-04-26 | End: 2022-05-03 | Stop reason: HOSPADM

## 2022-04-26 RX ORDER — HYDROMORPHONE HCL/PF 1 MG/ML
0.5 SYRINGE (ML) INJECTION EVERY 2 HOUR PRN
Status: DISCONTINUED | OUTPATIENT
Start: 2022-04-26 | End: 2022-05-01

## 2022-04-26 RX ADMIN — GABAPENTIN 100 MG: 100 CAPSULE ORAL at 08:52

## 2022-04-26 RX ADMIN — GABAPENTIN 100 MG: 100 CAPSULE ORAL at 18:11

## 2022-04-26 RX ADMIN — METHOCARBAMOL 500 MG: 500 TABLET ORAL at 05:04

## 2022-04-26 RX ADMIN — ENOXAPARIN SODIUM 30 MG: 30 INJECTION SUBCUTANEOUS at 18:11

## 2022-04-26 RX ADMIN — SENNOSIDES AND DOCUSATE SODIUM 2 TABLET: 50; 8.6 TABLET ORAL at 08:52

## 2022-04-26 RX ADMIN — POLYETHYLENE GLYCOL 3350 17 G: 17 POWDER, FOR SOLUTION ORAL at 08:52

## 2022-04-26 RX ADMIN — HYDROMORPHONE HYDROCHLORIDE 0.5 MG: 1 INJECTION, SOLUTION INTRAMUSCULAR; INTRAVENOUS; SUBCUTANEOUS at 14:27

## 2022-04-26 RX ADMIN — ACETAMINOPHEN 975 MG: 325 TABLET ORAL at 05:04

## 2022-04-26 RX ADMIN — HYDROMORPHONE HYDROCHLORIDE 0.5 MG: 1 INJECTION, SOLUTION INTRAMUSCULAR; INTRAVENOUS; SUBCUTANEOUS at 03:05

## 2022-04-26 RX ADMIN — METHOCARBAMOL 750 MG: 750 TABLET ORAL at 18:11

## 2022-04-26 RX ADMIN — OXYCODONE HYDROCHLORIDE 10 MG: 10 TABLET ORAL at 08:57

## 2022-04-26 RX ADMIN — LIDOCAINE 5% 1 PATCH: 700 PATCH TOPICAL at 11:31

## 2022-04-26 RX ADMIN — GABAPENTIN 100 MG: 100 CAPSULE ORAL at 20:20

## 2022-04-26 RX ADMIN — HYDROMORPHONE HYDROCHLORIDE 0.5 MG: 1 INJECTION, SOLUTION INTRAMUSCULAR; INTRAVENOUS; SUBCUTANEOUS at 18:26

## 2022-04-26 RX ADMIN — METHOCARBAMOL 750 MG: 750 TABLET ORAL at 11:31

## 2022-04-26 RX ADMIN — OXYCODONE HYDROCHLORIDE 10 MG: 10 TABLET ORAL at 13:09

## 2022-04-26 RX ADMIN — METHOCARBAMOL 750 MG: 750 TABLET ORAL at 23:13

## 2022-04-26 RX ADMIN — OXYCODONE HYDROCHLORIDE 10 MG: 10 TABLET ORAL at 20:19

## 2022-04-26 RX ADMIN — ACETAMINOPHEN 975 MG: 325 TABLET ORAL at 14:29

## 2022-04-26 RX ADMIN — ACETAMINOPHEN 975 MG: 325 TABLET ORAL at 21:00

## 2022-04-26 RX ADMIN — IOHEXOL 100 ML: 350 INJECTION, SOLUTION INTRAVENOUS at 17:33

## 2022-04-26 RX ADMIN — HYDROMORPHONE HYDROCHLORIDE 0.5 MG: 1 INJECTION, SOLUTION INTRAMUSCULAR; INTRAVENOUS; SUBCUTANEOUS at 10:15

## 2022-04-26 RX ADMIN — ENOXAPARIN SODIUM 30 MG: 30 INJECTION SUBCUTANEOUS at 05:04

## 2022-04-26 NOTE — PLAN OF CARE
Problem: Prexisting or High Potential for Compromised Skin Integrity  Goal: Skin integrity is maintained or improved  Description: INTERVENTIONS:  - Identify patients at risk for skin breakdown  - Assess and monitor skin integrity  - Assess and monitor nutrition and hydration status  - Monitor labs   - Assess for incontinence   - Turn and reposition patient  - Assist with mobility/ambulation  - Relieve pressure over bony prominences  - Avoid friction and shearing  - Provide appropriate hygiene as needed including keeping skin clean and dry  - Evaluate need for skin moisturizer/barrier cream  - Collaborate with interdisciplinary team   - Patient/family teaching  - Consider wound care consult   Outcome: Progressing     Problem: MOBILITY - ADULT  Goal: Maintain or return to baseline ADL function  Description: INTERVENTIONS:  -  Assess patient's ability to carry out ADLs; assess patient's baseline for ADL function and identify physical deficits which impact ability to perform ADLs (bathing, care of mouth/teeth, toileting, grooming, dressing, etc )  - Assess/evaluate cause of self-care deficits   - Assess range of motion  - Assess patient's mobility; develop plan if impaired  - Assess patient's need for assistive devices and provide as appropriate  - Encourage maximum independence but intervene and supervise when necessary  - Involve family in performance of ADLs  - Assess for home care needs following discharge   - Consider OT consult to assist with ADL evaluation and planning for discharge  - Provide patient education as appropriate  Outcome: Progressing     Problem: MOBILITY - ADULT  Goal: Maintains/Returns to pre admission functional level  Description: INTERVENTIONS:  - Perform BMAT or MOVE assessment daily    - Set and communicate daily mobility goal to care team and patient/family/caregiver  - Collaborate with rehabilitation services on mobility goals if consulted  - Perform Range of Motion 3 times a day    - Reposition patient every 2 hours  - Record patient progress and toleration of activity level   Outcome: Progressing     Problem: Potential for Falls  Goal: Patient will remain free of falls  Description: INTERVENTIONS:  - Educate patient/family on patient safety including physical limitations  - Instruct patient to call for assistance with activity   - Consult OT/PT to assist with strengthening/mobility   - Keep Call bell within reach  - Keep bed low and locked with side rails adjusted as appropriate  - Keep care items and personal belongings within reach  - Initiate and maintain comfort rounds  - Make Fall Risk Sign visible to staff  - Offer Toileting every 2 Hours, in advance of need  - Obtain necessary fall risk management equipment  - Apply yellow socks and bracelet for high fall risk patients  - Consider moving patient to room near nurses station  Outcome: Progressing     Problem: Nutrition/Hydration-ADULT  Goal: Nutrient/Hydration intake appropriate for improving, restoring or maintaining nutritional needs  Description: Monitor and assess patient's nutrition/hydration status for malnutrition  Collaborate with interdisciplinary team and initiate plan and interventions as ordered  Monitor patient's weight and dietary intake as ordered or per policy  Utilize nutrition screening tool and intervene as necessary  Determine patient's food preferences and provide high-protein, high-caloric foods as appropriate       INTERVENTIONS:  - Monitor oral intake, urinary output, labs, and treatment plans  - Assess nutrition and hydration status and recommend course of action  - Evaluate amount of meals eaten  - Assist patient with eating if necessary   - Allow adequate time for meals  - Recommend/ encourage appropriate diets, oral nutritional supplements, and vitamin/mineral supplements  - Order, calculate, and assess calorie counts as needed  - Recommend, monitor, and adjust tube feedings and TPN/PPN based on assessed needs  - Assess need for intravenous fluids  - Provide specific nutrition/hydration education as appropriate  - Include patient/family/caregiver in decisions related to nutrition  Outcome: Progressing

## 2022-04-26 NOTE — ASSESSMENT & PLAN NOTE
- Acute mildly displaced fracture of the left orbital floor, present on presentation   - Appreciate OMS evaluation and recommendations  Non operative management recommended  - Appreciate Ophthalmology evaluation and recommendations  No intervention or further workup at this time  Recommend outpatient follow-up  - Maintain sinus precautions  - Initiate multimodal analgesic regimen   - Outpatient follow-up with OMS and Ophthalmology
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
## 2022-04-26 NOTE — ASSESSMENT & PLAN NOTE
- Acute comminuted angulated 1st left metatarsal shaft fracture with associated intra-articular fracture involving the base of the 1st metatarsal, moderately displaced fracture of the proximal 2nd metatarsal, and fracture through the 5th metatarsal head, present on admission   - Appreciate Orthopedic surgery evaluation and recommendations  Anticipate potential need for operative intervention in delayed fashion pending improvement in swelling  Per Orthopedic surgery, surgery anticipated in approximately 2 weeks  - CT scan of the left lower extremity from 4/24/2022 reviewed  - Maintain NON-weightbearing status on the left lower extremity in CAM boot  - Monitor left lower extremity neurovascular exam   Patient with significant swelling, but this is improving; no evidence of compartment syndrome at this time  - Continue multimodal analgesic regimen   - Continue DVT prophylaxis  - PT and OT evaluation and treatment as indicated

## 2022-04-26 NOTE — ASSESSMENT & PLAN NOTE
- Acute right knee and distal thigh pain and swelling developed spontaneously on 04/25/2022  Suspect likely soft tissue contusion and hematoma, but cannot rule out septic joint or additional traumatic injury   - Right knee x-rays from 04/25/2022 reviewed without evidence of underlying fracture, but there was suprapatellar soft tissue swelling noted  - Orthopedic surgery contacted regarding this issue and will assess for possible tap of joint verses drainage of fluid collection today  - Further workup with CT scan of the right lower extremity with IV contrast   - Continue to monitor neurovascular exam; no evidence of compartment syndrome at this time  - Continue multimodal analgesic regimen
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
## 2022-04-27 LAB
ANION GAP SERPL CALCULATED.3IONS-SCNC: 8 MMOL/L (ref 4–13)
BUN SERPL-MCNC: 15 MG/DL (ref 5–25)
CALCIUM SERPL-MCNC: 8.1 MG/DL (ref 8.3–10.1)
CHLORIDE SERPL-SCNC: 99 MMOL/L (ref 100–108)
CO2 SERPL-SCNC: 27 MMOL/L (ref 21–32)
CREAT SERPL-MCNC: 1.08 MG/DL (ref 0.6–1.3)
ERYTHROCYTE [DISTWIDTH] IN BLOOD BY AUTOMATED COUNT: 12.3 % (ref 11.6–15.1)
GFR SERPL CREATININE-BSD FRML MDRD: 87 ML/MIN/1.73SQ M
GLUCOSE SERPL-MCNC: 132 MG/DL (ref 65–140)
HCT VFR BLD AUTO: 24 % (ref 36.5–49.3)
HGB BLD-MCNC: 8.4 G/DL (ref 12–17)
MCH RBC QN AUTO: 32.1 PG (ref 26.8–34.3)
MCHC RBC AUTO-ENTMCNC: 35 G/DL (ref 31.4–37.4)
MCV RBC AUTO: 92 FL (ref 82–98)
PLATELET # BLD AUTO: 158 THOUSANDS/UL (ref 149–390)
PMV BLD AUTO: 10.8 FL (ref 8.9–12.7)
POTASSIUM SERPL-SCNC: 4.6 MMOL/L (ref 3.5–5.3)
RBC # BLD AUTO: 2.62 MILLION/UL (ref 3.88–5.62)
SODIUM SERPL-SCNC: 134 MMOL/L (ref 136–145)
WBC # BLD AUTO: 10.03 THOUSAND/UL (ref 4.31–10.16)

## 2022-04-27 PROCEDURE — 85027 COMPLETE CBC AUTOMATED: CPT | Performed by: PHYSICIAN ASSISTANT

## 2022-04-27 PROCEDURE — 99231 SBSQ HOSP IP/OBS SF/LOW 25: CPT

## 2022-04-27 PROCEDURE — 80048 BASIC METABOLIC PNL TOTAL CA: CPT | Performed by: PHYSICIAN ASSISTANT

## 2022-04-27 PROCEDURE — 99232 SBSQ HOSP IP/OBS MODERATE 35: CPT | Performed by: STUDENT IN AN ORGANIZED HEALTH CARE EDUCATION/TRAINING PROGRAM

## 2022-04-27 RX ORDER — KETOROLAC TROMETHAMINE 30 MG/ML
15 INJECTION, SOLUTION INTRAMUSCULAR; INTRAVENOUS EVERY 6 HOURS SCHEDULED
Status: COMPLETED | OUTPATIENT
Start: 2022-04-27 | End: 2022-04-29

## 2022-04-27 RX ORDER — HYDROMORPHONE HYDROCHLORIDE 2 MG/1
4 TABLET ORAL EVERY 4 HOURS PRN
Status: DISCONTINUED | OUTPATIENT
Start: 2022-04-27 | End: 2022-05-03 | Stop reason: HOSPADM

## 2022-04-27 RX ORDER — HYDROMORPHONE HYDROCHLORIDE 2 MG/1
2 TABLET ORAL EVERY 4 HOURS PRN
Status: DISCONTINUED | OUTPATIENT
Start: 2022-04-27 | End: 2022-05-03 | Stop reason: HOSPADM

## 2022-04-27 RX ADMIN — ACETAMINOPHEN 975 MG: 325 TABLET ORAL at 21:16

## 2022-04-27 RX ADMIN — ENOXAPARIN SODIUM 30 MG: 30 INJECTION SUBCUTANEOUS at 05:31

## 2022-04-27 RX ADMIN — HYDROMORPHONE HYDROCHLORIDE 4 MG: 2 TABLET ORAL at 21:16

## 2022-04-27 RX ADMIN — METHOCARBAMOL 750 MG: 750 TABLET ORAL at 18:21

## 2022-04-27 RX ADMIN — GABAPENTIN 100 MG: 100 CAPSULE ORAL at 08:29

## 2022-04-27 RX ADMIN — HYDROMORPHONE HYDROCHLORIDE 4 MG: 2 TABLET ORAL at 09:15

## 2022-04-27 RX ADMIN — GABAPENTIN 100 MG: 100 CAPSULE ORAL at 21:16

## 2022-04-27 RX ADMIN — METHOCARBAMOL 750 MG: 750 TABLET ORAL at 13:21

## 2022-04-27 RX ADMIN — ACETAMINOPHEN 975 MG: 325 TABLET ORAL at 05:31

## 2022-04-27 RX ADMIN — KETAMINE HYDROCHLORIDE 0.2 MG/KG/HR: 50 INJECTION, SOLUTION INTRAMUSCULAR; INTRAVENOUS at 09:41

## 2022-04-27 RX ADMIN — KETOROLAC TROMETHAMINE 15 MG: 30 INJECTION, SOLUTION INTRAMUSCULAR at 18:21

## 2022-04-27 RX ADMIN — METHOCARBAMOL 750 MG: 750 TABLET ORAL at 05:31

## 2022-04-27 RX ADMIN — LIDOCAINE 5% 1 PATCH: 700 PATCH TOPICAL at 08:29

## 2022-04-27 RX ADMIN — GABAPENTIN 100 MG: 100 CAPSULE ORAL at 16:01

## 2022-04-27 RX ADMIN — ENOXAPARIN SODIUM 30 MG: 30 INJECTION SUBCUTANEOUS at 18:20

## 2022-04-27 RX ADMIN — KETOROLAC TROMETHAMINE 15 MG: 30 INJECTION, SOLUTION INTRAMUSCULAR at 09:40

## 2022-04-27 RX ADMIN — SENNOSIDES AND DOCUSATE SODIUM 2 TABLET: 50; 8.6 TABLET ORAL at 08:29

## 2022-04-27 RX ADMIN — OXYCODONE HYDROCHLORIDE 10 MG: 10 TABLET ORAL at 03:43

## 2022-04-27 RX ADMIN — ACETAMINOPHEN 975 MG: 325 TABLET ORAL at 13:21

## 2022-04-27 NOTE — QUICK NOTE
Patient having increased R knee pain  Extremely tender to palpation, unable to straight leg raise  Exam limited d/t pain, unable to palapate quad tendon  Thigh and leg compartments are soft and compressible  Sensation motor grossly intact L3-S1   Dp pulse intact bilaterally       Aspiration yesterday unsuccessful, no reason to repeat procedure today     Inpatient MRI ordered  Continue to follow with ortho   NWB B/l LE
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
## 2022-04-27 NOTE — ASSESSMENT & PLAN NOTE
- Right-sided pneumothorax, present on admission   - Management of chest wall contusion as noted  No obvious rib fractures noted  - Continue to encourage incentive spirometer use and adequate pulmonary hygiene  Patient with PIC score of 8 (2 / 3 / 3)  - Supplemental oxygen via nasal cannula as needed  On room air, no supplemental oxygen required  - Repeat chest x-ray on 4/26/2022 reviewed showing stability of the right pneumothorax  - Outpatient follow-up in the trauma clinic for re-evaluation in approximately 2 weeks with repeat chest x-ray at that time

## 2022-04-27 NOTE — ASSESSMENT & PLAN NOTE
- Acute right knee and distal thigh pain and swelling developed spontaneously on 04/25/2022  Suspect likely soft tissue contusion and hematoma, but cannot rule out septic joint or additional traumatic injury   - Right knee x-rays from 04/25/2022 reviewed without evidence of underlying fracture, but there was suprapatellar soft tissue swelling noted  - CT right lower extremity shows right knee hematoma without active extravasation  - orthopedics recommending MRI right knee and maintaining nonweightbearing status to the right lower extremity in the meantime   - Continue to monitor neurovascular exam; no evidence of compartment syndrome at this time  - Continue multimodal analgesic regimen
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
## 2022-04-27 NOTE — ASSESSMENT & PLAN NOTE
- Acute comminuted angulated 1st left metatarsal shaft fracture with associated intra-articular fracture involving the base of the 1st metatarsal, moderately displaced fracture of the proximal 2nd metatarsal, and fracture through the 5th metatarsal head, present on admission   - Appreciate Orthopedic surgery evaluation and recommendations  Anticipate potential need for operative intervention in delayed fashion pending improvement in swelling  Per Orthopedic surgery, surgery anticipated in approximately 2 weeks  - CT scan of the left lower extremity from 4/24/2022 reviewed  - Maintain NON-weightbearing status on the left lower extremity in CAM boot  - Monitor left lower extremity neurovascular exam   Patient with significant swelling, but this is improving; no evidence of compartment syndrome at this time  - Continue multimodal analgesic regimen   - Continue DVT prophylaxis  - PT and OT evaluation and treatment as indicated  Patient Information     Patient Name MRN Sex Fatoumata Pérez [id number] Female 2017      Patient Instructions by Feliz Solorio MD at 2017  9:46 AM     Author:  Feliz Solorio MD Service:  (none) Author Type:  Physician     Filed:  2017  9:46 AM Encounter Date:  2017 Status:  Signed     :  Feliz Solorio MD (Physician)              Growth Percentiles  Weight: 18 %ile based on WHO (Girls, 0-2 years) weight-for-age data using vitals from 2017.  Length: 31 %ile based on WHO (Girls, 0-2 years) length-for-age data using vitals from 2017.  Weight for length: 17 %ile based on WHO (Girls, 0-2 years) weight-for-recumbent length data using vitals from 2017.  Head Circumference: 53 %ile based on WHO (Girls, 0-2 years) head circumference-for-age data using vitals from 2017.    Health and Wellness: 18 Months     Immunizations (Shots) Today  Your child may receive these shots at this time:    DTaP (diphtheria, tetanus and acellular pertussis)    Hep A (hepatitis A)    Influenza    Talk with your health care provider for information about giving acetaminophen (Tylenol ) before and after your child s immunizations.    Development  At this age, your child may:    walk fast, run stiffly, walk backwards and walk up stairs with one hand held    sit in a small chair and climb into an adult chair    kick and throw a ball    stack three or four blocks and put rings on a cone    turn single pages in a book or magazine, look at pictures and name some objects    speak four to 10 words, combine two-word phrases, understand and follow simple directions, speak two or more wants or needs and point to a body part when asked    pull a toy    imitate a crayon stroke on paper    feed himself or herself, use a spoon and hold and drink from a sippy cup fairly well    use a household toy (like a toy telephone) well.    Feeding Tips    Your child's food likes and dislikes may change. Do not make  mealtimes a rios. Give your child a good example with your own food choices.    Offer your child a variety of healthful foods. Your child should decide how much she eats.    To see if your child has a healthful diet, look at a 4 or 5 day span to see if she is eating a good balance of foods from the food groups.    Limit sweets and fast foods.     Do not offer food as rewards.     Your child does not need juice.    Teach your child to wash her hands and face often. This is important before eating and drinking.    Your child needs at least 700 mg of calcium and 800 IU of vitamin D each day.    Milk is an excellent source of calcium and vitamin D.    Toilet Training    Your child may show interest in potty training. Signs she may be ready include dry naps, use of words like  pee pee,   wee wee  or  poo,   grunting and straining after meals, realizing the need to go, going to the potty alone and undressing.     For most children, this interest in toilet training happens between the ages of 2 and 3.      Sleep    Your child s nap schedule may vary from no naps to two naps each day. If your child does not nap, you may want to start a  quiet time.  Be sure to use this time for yourself!    Your child may have night fears. Using a night light or opening the bedroom door may help calm fears.    Choose calm activities before bedtime. A consistent bedtime is best.    Continue your regular nighttime routine: bath, brushing teeth and reading.    Safety    Use an approved car seat for the height and weight of your child every time she rides in a vehicle. The car seat must be properly secured in the back seat.     According to state law, the car seat must be rear-facing (facing the rear window) until your child is 20 pounds AND 1 year old. Safety guidelines suggest that children should be rear-facing until age 2.    Be a good role model for your child. Do not talk or text on your cellphone while driving.    Protect your child  "from falls, burns, drowning, choking and other accidents.    Keep all medicines, cleaning supplies and poisons locked and out of your child s reach.     Call the poison control center (1-326.652.7320) or your health care provider for directions in case your child swallows poison. Have these numbers handy by your telephone or program them into your phone.    Do not leave your child alone in the car or the house, even for a minute.    The American Academy of Pediatrics recommends that if you want to introduce screen time to your child, choose high-quality programs and watch them with your child.    What Your Child Needs    Your child may become stubborn and possessive. Do not expect her to share toys with other children.     Give your child strong toys that can pull apart, be put together or be used to build. Stay away from toys with small or sharp parts.    Your child may become interested in exploring the home. If possible, let her play with pots, pans, and plastic dishes or \"help\" with simple chores like sweeping.    Make sure your child is getting consistent discipline at home and at . Talk with your  provider if this isn t the case.    Praise your child for positive, appropriate behavior. Your child does not understand danger or remember the word  no.  Distract or prevent your child from getting into dangerous or negative behavior.    Ignore temper tantrums. Make sure the child is in a safe place during the tantrum or you may hold your child gently, but firmly.    Never shake or hit your child. If you think you are losing control, make sure your child is safe and take a 10-minute time out. If you are still not calm, call a friend, neighbor or relative to come over and help you. If you have no other options, call your local crisis nursery or [facility] at [phone] or dial 211.    Read to your child often. Set aside a few quiet minutes every day for sharing books together. This time " should be free of television, texting and other distractions.     Consider joining a parent child group, such as Early Childhood Family Education (ECFE) through your local school district.      Dental Care    Make regular dental appointments for cleanings and checkups starting at age 3 or earlier if there are questions or concerns. (Your child may need fluoride supplements if you have well water.)    Using bottles increases the risk for cavities and ear infections.    Brush your child s teeth one to two times each day with a soft-bristled toothbrush. You do not need to use toothpaste. If you do, use a very small amount without fluoride. Let your child play with the toothbrush after brushing.      Your Child s Next Well Checkup    Your child s next well checkup will be at age 2.    Your child may need these shots:   o Influenza    Talk with your health care provider for information about giving acetaminophen (Tylenol ) before and after your child s immunizations.    Acetaminophen Dosage Chart  Dosages may be repeated every 4 hours, but should not be given more than 5 times in 24 hours. (Note: Milliliter is abbreviated as mL; 5 mL equals 1 teaspoon. Do not use household dinnerware spoons, which can vary in size.) Do not save droppers from old bottles. Only use the dosing tool that comes with the medicine.     For the chart below: Find your child s weight. Follow the row that matches your child s weight to suspension or liquid, or chewable tablets or meltaways.    Weight   (pounds) Age Dose   (milligrams)  Children s liquid or suspension  160 mg/5 mL Children's chewable tablets or meltaways   80 mg Children s chewable tablets or meltaways   160 mg   6 to 11   to 2 years 40 mg 1.25 mL  (  teaspoon) -- --   12 to 17   80 mg 2.5 mL  (  teaspoon) -- --   18 to 23   120 mg 3.75 mL  (  teaspoon) -- --   24 to 35  2 to 3 years 160 mg 5 mL  (1 teaspoon) 2 1   36 to 47  4 to 5 years 240 mg 7.5 mL  (1 and     teaspoon) 3  "1     48 to 59  6 to 8 years 320 mg 10 mL  (2 teaspoons) 4 2   60 to 71  9 to 10 years 400 mg 12.5 mL  (2 and    teaspoon) 5 2     72 to 95  11 years 480 mg 15 mL  (3 teaspoons) 6 3 children s tablets or meltaways, or 1 to 1   adult 325 mg tablets   96+  12 years 640 mg 20 mL  (4 teaspoons) 8 4 children s tablets or meltaways, or 2 adult 325 mg tablets     Information combined from http://www.tylenol.[employer] , AAP as an excerpt from \"Caring for Your Baby and Young Child: Birth to Age 5\" [place] 2004 2006 American Academy of Pediatrics, and http://www.babycenter.com/0_rhmqynlscrgdt-rflkpe-lrzzd_86582.bc        2013 [employer]  AND THE [employer] LOGO ARE REGISTERED TR[employer]EMARKS OF [employer]   OTHER TRADEMARKS USED ARE OWNED BY THEIR RESPECTIVE OWNERS  [facility]-11071 (9/13)          "

## 2022-04-27 NOTE — PROGRESS NOTES
PHYSICAL THERAPY CANCELLATION NOTE    Name: Jeannette Griggs  : 1985       04/27/22 0808   PT Last Visit   PT Visit Date 22   Note Type   Note type Cancelled Session   Cancel Reasons Other   Additional Comments Chart review performed  Pt w/ worsened swelling and pain in RLE, continuing to require workup for etiology  Will hold PT intervention for today given significant pain on pt WB leg  Will f/u as appropriate and schedule allows  Amanda So, SPT

## 2022-04-27 NOTE — PLAN OF CARE
Problem: Prexisting or High Potential for Compromised Skin Integrity  Goal: Skin integrity is maintained or improved  Description: INTERVENTIONS:  - Identify patients at risk for skin breakdown  - Assess and monitor skin integrity  - Assess and monitor nutrition and hydration status  - Monitor labs   - Assess for incontinence   - Turn and reposition patient  - Assist with mobility/ambulation  - Relieve pressure over bony prominences  - Avoid friction and shearing  - Provide appropriate hygiene as needed including keeping skin clean and dry  - Evaluate need for skin moisturizer/barrier cream  - Collaborate with interdisciplinary team   - Patient/family teaching  - Consider wound care consult   Outcome: Progressing     Problem: MOBILITY - ADULT  Goal: Maintain or return to baseline ADL function  Description: INTERVENTIONS:  -  Assess patient's ability to carry out ADLs; assess patient's baseline for ADL function and identify physical deficits which impact ability to perform ADLs (bathing, care of mouth/teeth, toileting, grooming, dressing, etc )  - Assess/evaluate cause of self-care deficits   - Assess range of motion  - Assess patient's mobility; develop plan if impaired  - Assess patient's need for assistive devices and provide as appropriate  - Encourage maximum independence but intervene and supervise when necessary  - Involve family in performance of ADLs  - Assess for home care needs following discharge   - Consider OT consult to assist with ADL evaluation and planning for discharge  - Provide patient education as appropriate  Outcome: Progressing  Goal: Maintains/Returns to pre admission functional level  Description: INTERVENTIONS:  - Perform BMAT or MOVE assessment daily    - Set and communicate daily mobility goal to care team and patient/family/caregiver  - Collaborate with rehabilitation services on mobility goals if consulted  - Perform Range of Motion 3 times a day    - Reposition patient every 2 hours   - Dangle patient 3 times a day  - Stand patient 3 times a day  - Ambulate patient 3 times a day  - Out of bed to chair 3 times a day   - Out of bed for meals 3 times a day  - Out of bed for toileting  - Record patient progress and toleration of activity level   Outcome: Progressing     Problem: Potential for Falls  Goal: Patient will remain free of falls  Description: INTERVENTIONS:  - Educate patient/family on patient safety including physical limitations  - Instruct patient to call for assistance with activity   - Consult OT/PT to assist with strengthening/mobility   - Keep Call bell within reach  - Keep bed low and locked with side rails adjusted as appropriate  - Keep care items and personal belongings within reach  - Initiate and maintain comfort rounds  - Make Fall Risk Sign visible to staff  - Offer Toileting every 3 Hours, in advance of need  - Obtain necessary fall risk management equipment  - Apply yellow socks and bracelet for high fall risk patients  - Consider moving patient to room near nurses station  Outcome: Progressing     Problem: Nutrition/Hydration-ADULT  Goal: Nutrient/Hydration intake appropriate for improving, restoring or maintaining nutritional needs  Description: Monitor and assess patient's nutrition/hydration status for malnutrition  Collaborate with interdisciplinary team and initiate plan and interventions as ordered  Monitor patient's weight and dietary intake as ordered or per policy  Utilize nutrition screening tool and intervene as necessary  Determine patient's food preferences and provide high-protein, high-caloric foods as appropriate       INTERVENTIONS:  - Monitor oral intake, urinary output, labs, and treatment plans  - Assess nutrition and hydration status and recommend course of action  - Evaluate amount of meals eaten  - Assist patient with eating if necessary   - Allow adequate time for meals  - Recommend/ encourage appropriate diets, oral nutritional supplements, and vitamin/mineral supplements  - Order, calculate, and assess calorie counts as needed  - Recommend, monitor, and adjust tube feedings and TPN/PPN based on assessed needs  - Assess need for intravenous fluids  - Provide specific nutrition/hydration education as appropriate  - Include patient/family/caregiver in decisions related to nutrition  Outcome: Progressing

## 2022-04-27 NOTE — PLAN OF CARE
Problem: Prexisting or High Potential for Compromised Skin Integrity  Goal: Skin integrity is maintained or improved  Description: INTERVENTIONS:  - Identify patients at risk for skin breakdown  - Assess and monitor skin integrity  - Assess and monitor nutrition and hydration status  - Monitor labs   - Assess for incontinence   - Turn and reposition patient  - Assist with mobility/ambulation  - Relieve pressure over bony prominences  - Avoid friction and shearing  - Provide appropriate hygiene as needed including keeping skin clean and dry  - Evaluate need for skin moisturizer/barrier cream  - Collaborate with interdisciplinary team   - Patient/family teaching  - Consider wound care consult   Outcome: Progressing     Problem: MOBILITY - ADULT  Goal: Maintain or return to baseline ADL function  Description: INTERVENTIONS:  -  Assess patient's ability to carry out ADLs; assess patient's baseline for ADL function and identify physical deficits which impact ability to perform ADLs (bathing, care of mouth/teeth, toileting, grooming, dressing, etc )  - Assess/evaluate cause of self-care deficits   - Assess range of motion  - Assess patient's mobility; develop plan if impaired  - Assess patient's need for assistive devices and provide as appropriate  - Encourage maximum independence but intervene and supervise when necessary  - Involve family in performance of ADLs  - Assess for home care needs following discharge   - Consider OT consult to assist with ADL evaluation and planning for discharge  - Provide patient education as appropriate  Outcome: Progressing     Problem: Potential for Falls  Goal: Patient will remain free of falls  Description: INTERVENTIONS:  - Educate patient/family on patient safety including physical limitations  - Instruct patient to call for assistance with activity   - Consult OT/PT to assist with strengthening/mobility   - Keep Call bell within reach  - Keep bed low and locked with side rails adjusted as appropriate  - Keep care items and personal belongings within reach  - Initiate and maintain comfort rounds  - Make Fall Risk Sign visible to staff  - Offer Toileting every 2 Hours, in advance of need  - Initiate/Maintain bed alarm  - Obtain necessary fall risk management equipment: bed alarm  - Apply yellow socks and bracelet for high fall risk patients  - Consider moving patient to room near nurses station  Outcome: Progressing     Problem: Nutrition/Hydration-ADULT  Goal: Nutrient/Hydration intake appropriate for improving, restoring or maintaining nutritional needs  Description: Monitor and assess patient's nutrition/hydration status for malnutrition  Collaborate with interdisciplinary team and initiate plan and interventions as ordered  Monitor patient's weight and dietary intake as ordered or per policy  Utilize nutrition screening tool and intervene as necessary  Determine patient's food preferences and provide high-protein, high-caloric foods as appropriate       INTERVENTIONS:  - Monitor oral intake, urinary output, labs, and treatment plans  - Assess nutrition and hydration status and recommend course of action  - Evaluate amount of meals eaten  - Assist patient with eating if necessary   - Allow adequate time for meals  - Recommend/ encourage appropriate diets, oral nutritional supplements, and vitamin/mineral supplements  - Order, calculate, and assess calorie counts as needed  - Recommend, monitor, and adjust tube feedings and TPN/PPN based on assessed needs  - Assess need for intravenous fluids  - Provide specific nutrition/hydration education as appropriate  - Include patient/family/caregiver in decisions related to nutrition  Outcome: Progressing
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
## 2022-04-27 NOTE — ASSESSMENT & PLAN NOTE
- Acute pain due to multiple traumatic injuries  - Continue multimodal analgesic regimen   - Continue bowel regimen  - Monitor pain and adjust regimen as indicated  Pain is currently well controlled

## 2022-04-28 ENCOUNTER — APPOINTMENT (INPATIENT)
Dept: MRI IMAGING | Facility: HOSPITAL | Age: 37
DRG: 135 | End: 2022-04-28
Payer: COMMERCIAL

## 2022-04-28 PROCEDURE — 99232 SBSQ HOSP IP/OBS MODERATE 35: CPT | Performed by: PHYSICIAN ASSISTANT

## 2022-04-28 PROCEDURE — 73721 MRI JNT OF LWR EXTRE W/O DYE: CPT

## 2022-04-28 PROCEDURE — 99232 SBSQ HOSP IP/OBS MODERATE 35: CPT | Performed by: STUDENT IN AN ORGANIZED HEALTH CARE EDUCATION/TRAINING PROGRAM

## 2022-04-28 PROCEDURE — G1004 CDSM NDSC: HCPCS

## 2022-04-28 RX ORDER — MECLIZINE HCL 12.5 MG/1
12.5 TABLET ORAL EVERY 8 HOURS PRN
Status: DISCONTINUED | OUTPATIENT
Start: 2022-04-28 | End: 2022-05-03 | Stop reason: HOSPADM

## 2022-04-28 RX ORDER — HALOPERIDOL 5 MG/ML
2 INJECTION INTRAMUSCULAR EVERY 6 HOURS PRN
Status: DISCONTINUED | OUTPATIENT
Start: 2022-04-28 | End: 2022-05-03 | Stop reason: HOSPADM

## 2022-04-28 RX ADMIN — ENOXAPARIN SODIUM 30 MG: 30 INJECTION SUBCUTANEOUS at 06:04

## 2022-04-28 RX ADMIN — KETOROLAC TROMETHAMINE 15 MG: 30 INJECTION, SOLUTION INTRAMUSCULAR at 06:04

## 2022-04-28 RX ADMIN — GABAPENTIN 100 MG: 100 CAPSULE ORAL at 22:07

## 2022-04-28 RX ADMIN — KETOROLAC TROMETHAMINE 15 MG: 30 INJECTION, SOLUTION INTRAMUSCULAR at 12:59

## 2022-04-28 RX ADMIN — ACETAMINOPHEN 975 MG: 325 TABLET ORAL at 14:25

## 2022-04-28 RX ADMIN — MECLIZINE 12.5 MG: 12.5 TABLET ORAL at 22:06

## 2022-04-28 RX ADMIN — METHOCARBAMOL 750 MG: 750 TABLET ORAL at 00:00

## 2022-04-28 RX ADMIN — HYDROMORPHONE HYDROCHLORIDE 4 MG: 2 TABLET ORAL at 17:06

## 2022-04-28 RX ADMIN — METHOCARBAMOL 750 MG: 750 TABLET ORAL at 12:59

## 2022-04-28 RX ADMIN — METHOCARBAMOL 750 MG: 750 TABLET ORAL at 06:04

## 2022-04-28 RX ADMIN — ENOXAPARIN SODIUM 30 MG: 30 INJECTION SUBCUTANEOUS at 17:05

## 2022-04-28 RX ADMIN — SENNOSIDES AND DOCUSATE SODIUM 2 TABLET: 50; 8.6 TABLET ORAL at 09:02

## 2022-04-28 RX ADMIN — LIDOCAINE 5% 1 PATCH: 700 PATCH TOPICAL at 09:02

## 2022-04-28 RX ADMIN — GABAPENTIN 100 MG: 100 CAPSULE ORAL at 09:02

## 2022-04-28 RX ADMIN — KETOROLAC TROMETHAMINE 15 MG: 30 INJECTION, SOLUTION INTRAMUSCULAR at 00:00

## 2022-04-28 RX ADMIN — METHOCARBAMOL 750 MG: 750 TABLET ORAL at 17:05

## 2022-04-28 RX ADMIN — KETAMINE HYDROCHLORIDE 0.2 MG/KG/HR: 50 INJECTION, SOLUTION INTRAMUSCULAR; INTRAVENOUS at 02:55

## 2022-04-28 RX ADMIN — KETOROLAC TROMETHAMINE 15 MG: 30 INJECTION, SOLUTION INTRAMUSCULAR at 17:06

## 2022-04-28 RX ADMIN — ACETAMINOPHEN 975 MG: 325 TABLET ORAL at 06:04

## 2022-04-28 RX ADMIN — HALOPERIDOL LACTATE 2 MG: 5 INJECTION, SOLUTION INTRAMUSCULAR at 22:06

## 2022-04-28 RX ADMIN — POLYETHYLENE GLYCOL 3350 17 G: 17 POWDER, FOR SOLUTION ORAL at 09:02

## 2022-04-28 RX ADMIN — ACETAMINOPHEN 975 MG: 325 TABLET ORAL at 22:07

## 2022-04-28 RX ADMIN — GABAPENTIN 100 MG: 100 CAPSULE ORAL at 17:05

## 2022-04-28 RX ADMIN — HYDROMORPHONE HYDROCHLORIDE 4 MG: 2 TABLET ORAL at 22:44
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
## 2022-04-28 NOTE — ASSESSMENT & PLAN NOTE
- Acute pain due to multiple traumatic injuries  - Appreciate APS evaluation and recommendations  - Continue multimodal analgesic regimen   - Continue bowel regimen  - Monitor pain and adjust regimen as indicated  Pain is currently well controlled

## 2022-04-28 NOTE — ASSESSMENT & PLAN NOTE
- Acute right knee and distal thigh pain and swelling developed spontaneously on 04/25/2022  Suspect likely soft tissue contusion and hematoma, but cannot rule out septic joint or additional traumatic injury   - Right knee x-rays from 04/25/2022 reviewed without evidence of underlying fracture, but there was suprapatellar soft tissue swelling noted  - CT right lower extremity from 04/26/2022 shows right knee hematoma without active extravasation   - Appreciate Orthopedic surgery evaluation and recommendations  Await MRI of the right knee, and maintain nonweightbearing status to the right lower extremity in the meantime   - Continue to monitor neurovascular exam; no evidence of compartment syndrome at this time  - Continue multimodal analgesic regimen
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
## 2022-04-28 NOTE — QUICK NOTE
MRI of the right knee reviewed  There is a subcutaneous collection likely hematoma in the anteriomedial thigh, no joint effusion  Compression dressing applied for hematoma  Consider IR drainage  No orthopedic intervention to the right knee at this time

## 2022-04-28 NOTE — OCCUPATIONAL THERAPY NOTE
Occupational Therapy Cancellation Note         Patient Name: Tori Hernandez  [id number] Date: 4/28/2022 04/28/22 0164   Note Type   Note type Cancelled Session   Additional Comments chart review completed  pt continues to be pending MRI for R knee  Will hold OT treatment at this time        [facility], OTR/L
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
## 2022-04-28 NOTE — PLAN OF CARE
Problem: Prexisting or High Potential for Compromised Skin Integrity  Goal: Skin integrity is maintained or improved  Description: INTERVENTIONS:  - Identify patients at risk for skin breakdown  - Assess and monitor skin integrity  - Assess and monitor nutrition and hydration status  - Monitor labs   - Assess for incontinence   - Turn and reposition patient  - Assist with mobility/ambulation  - Relieve pressure over bony prominences  - Avoid friction and shearing  - Provide appropriate hygiene as needed including keeping skin clean and dry  - Evaluate need for skin moisturizer/barrier cream  - Collaborate with interdisciplinary team   - Patient/family teaching  - Consider wound care consult   Outcome: Progressing     Problem: MOBILITY - ADULT  Goal: Maintain or return to baseline ADL function  Description: INTERVENTIONS:  -  Assess patient's ability to carry out ADLs; assess patient's baseline for ADL function and identify physical deficits which impact ability to perform ADLs (bathing, care of mouth/teeth, toileting, grooming, dressing, etc )  - Assess/evaluate cause of self-care deficits   - Assess range of motion  - Assess patient's mobility; develop plan if impaired  - Assess patient's need for assistive devices and provide as appropriate  - Encourage maximum independence but intervene and supervise when necessary  - Involve family in performance of ADLs  - Assess for home care needs following discharge   - Consider OT consult to assist with ADL evaluation and planning for discharge  - Provide patient education as appropriate  Outcome: Progressing     Problem: Potential for Falls  Goal: Patient will remain free of falls  Description: INTERVENTIONS:  - Educate patient/family on patient safety including physical limitations  - Instruct patient to call for assistance with activity   - Consult OT/PT to assist with strengthening/mobility   - Keep Call bell within reach  - Keep bed low and locked with side rails adjusted as appropriate  - Keep care items and personal belongings within reach  - Initiate and maintain comfort rounds  - Make Fall Risk Sign visible to staff  - Offer Toileting every 2 Hours, in advance of need  - Consider moving patient to room near nurses station  Outcome: Progressing

## 2022-04-28 NOTE — PHYSICAL THERAPY NOTE
Physical Therapy Cancellation Note:    Pt currently off floor at MRI  Cancel PT services for today and will cont to follow as able

## 2022-04-29 ENCOUNTER — APPOINTMENT (INPATIENT)
Dept: MRI IMAGING | Facility: HOSPITAL | Age: 37
DRG: 135 | End: 2022-04-29
Payer: COMMERCIAL

## 2022-04-29 ENCOUNTER — APPOINTMENT (INPATIENT)
Dept: RADIOLOGY | Facility: HOSPITAL | Age: 37
DRG: 135 | End: 2022-04-29
Payer: COMMERCIAL

## 2022-04-29 LAB
ABO GROUP BLD: NORMAL
ANION GAP SERPL CALCULATED.3IONS-SCNC: 6 MMOL/L (ref 4–13)
BLD GP AB SCN SERPL QL: NEGATIVE
BUN SERPL-MCNC: 22 MG/DL (ref 5–25)
CALCIUM SERPL-MCNC: 8 MG/DL (ref 8.3–10.1)
CHLORIDE SERPL-SCNC: 101 MMOL/L (ref 100–108)
CO2 SERPL-SCNC: 28 MMOL/L (ref 21–32)
CREAT SERPL-MCNC: 1.09 MG/DL (ref 0.6–1.3)
ERYTHROCYTE [DISTWIDTH] IN BLOOD BY AUTOMATED COUNT: 12.8 % (ref 11.6–15.1)
GFR SERPL CREATININE-BSD FRML MDRD: 86 ML/MIN/1.73SQ M
GLUCOSE SERPL-MCNC: 115 MG/DL (ref 65–140)
HCT VFR BLD AUTO: 16.4 % (ref 36.5–49.3)
HGB BLD-MCNC: 5.6 G/DL (ref 12–17)
MCH RBC QN AUTO: 32.2 PG (ref 26.8–34.3)
MCHC RBC AUTO-ENTMCNC: 34.1 G/DL (ref 31.4–37.4)
MCV RBC AUTO: 94 FL (ref 82–98)
PLATELET # BLD AUTO: 179 THOUSANDS/UL (ref 149–390)
PMV BLD AUTO: 10.1 FL (ref 8.9–12.7)
POTASSIUM SERPL-SCNC: 3.9 MMOL/L (ref 3.5–5.3)
RBC # BLD AUTO: 1.74 MILLION/UL (ref 3.88–5.62)
RH BLD: POSITIVE
SODIUM SERPL-SCNC: 135 MMOL/L (ref 136–145)
SPECIMEN EXPIRATION DATE: NORMAL
WBC # BLD AUTO: 7.8 THOUSAND/UL (ref 4.31–10.16)

## 2022-04-29 PROCEDURE — 99232 SBSQ HOSP IP/OBS MODERATE 35: CPT | Performed by: ANESTHESIOLOGY

## 2022-04-29 PROCEDURE — 86901 BLOOD TYPING SEROLOGIC RH(D): CPT | Performed by: PHYSICIAN ASSISTANT

## 2022-04-29 PROCEDURE — 99232 SBSQ HOSP IP/OBS MODERATE 35: CPT | Performed by: STUDENT IN AN ORGANIZED HEALTH CARE EDUCATION/TRAINING PROGRAM

## 2022-04-29 PROCEDURE — 85025 COMPLETE CBC W/AUTO DIFF WBC: CPT | Performed by: PHYSICIAN ASSISTANT

## 2022-04-29 PROCEDURE — 80048 BASIC METABOLIC PNL TOTAL CA: CPT | Performed by: PHYSICIAN ASSISTANT

## 2022-04-29 PROCEDURE — 86850 RBC ANTIBODY SCREEN: CPT | Performed by: PHYSICIAN ASSISTANT

## 2022-04-29 PROCEDURE — 86923 COMPATIBILITY TEST ELECTRIC: CPT

## 2022-04-29 PROCEDURE — 86900 BLOOD TYPING SEROLOGIC ABO: CPT | Performed by: PHYSICIAN ASSISTANT

## 2022-04-29 PROCEDURE — G1004 CDSM NDSC: HCPCS

## 2022-04-29 PROCEDURE — P9016 RBC LEUKOCYTES REDUCED: HCPCS

## 2022-04-29 PROCEDURE — 73721 MRI JNT OF LWR EXTRE W/O DYE: CPT

## 2022-04-29 PROCEDURE — 71045 X-RAY EXAM CHEST 1 VIEW: CPT

## 2022-04-29 RX ORDER — BISACODYL 10 MG
10 SUPPOSITORY, RECTAL RECTAL DAILY PRN
Status: DISCONTINUED | OUTPATIENT
Start: 2022-04-29 | End: 2022-05-03 | Stop reason: HOSPADM

## 2022-04-29 RX ADMIN — POLYETHYLENE GLYCOL 3350 17 G: 17 POWDER, FOR SOLUTION ORAL at 09:13

## 2022-04-29 RX ADMIN — HYDROMORPHONE HYDROCHLORIDE 4 MG: 2 TABLET ORAL at 06:03

## 2022-04-29 RX ADMIN — ACETAMINOPHEN 975 MG: 325 TABLET ORAL at 13:02

## 2022-04-29 RX ADMIN — KETAMINE HYDROCHLORIDE 0.2 MG/KG/HR: 50 INJECTION, SOLUTION INTRAMUSCULAR; INTRAVENOUS at 00:51

## 2022-04-29 RX ADMIN — KETOROLAC TROMETHAMINE 15 MG: 30 INJECTION, SOLUTION INTRAMUSCULAR at 06:02

## 2022-04-29 RX ADMIN — LIDOCAINE 5% 1 PATCH: 700 PATCH TOPICAL at 09:13

## 2022-04-29 RX ADMIN — ENOXAPARIN SODIUM 30 MG: 30 INJECTION SUBCUTANEOUS at 06:02

## 2022-04-29 RX ADMIN — METHOCARBAMOL 750 MG: 750 TABLET ORAL at 00:11

## 2022-04-29 RX ADMIN — ACETAMINOPHEN 975 MG: 325 TABLET ORAL at 06:02

## 2022-04-29 RX ADMIN — METHOCARBAMOL 750 MG: 750 TABLET ORAL at 18:16

## 2022-04-29 RX ADMIN — METHOCARBAMOL 750 MG: 750 TABLET ORAL at 13:02

## 2022-04-29 RX ADMIN — ENOXAPARIN SODIUM 30 MG: 30 INJECTION SUBCUTANEOUS at 18:16

## 2022-04-29 RX ADMIN — GABAPENTIN 100 MG: 100 CAPSULE ORAL at 09:13

## 2022-04-29 RX ADMIN — GABAPENTIN 100 MG: 100 CAPSULE ORAL at 18:16

## 2022-04-29 RX ADMIN — ACETAMINOPHEN 975 MG: 325 TABLET ORAL at 21:07

## 2022-04-29 RX ADMIN — KETOROLAC TROMETHAMINE 15 MG: 30 INJECTION, SOLUTION INTRAMUSCULAR at 00:12

## 2022-04-29 RX ADMIN — METHOCARBAMOL 750 MG: 750 TABLET ORAL at 06:02

## 2022-04-29 RX ADMIN — SENNOSIDES AND DOCUSATE SODIUM 2 TABLET: 50; 8.6 TABLET ORAL at 09:13
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
## 2022-04-29 NOTE — ASSESSMENT & PLAN NOTE
- Acute right knee and distal thigh pain and swelling developed spontaneously on 04/25/2022  Suspect likely soft tissue contusion and hematoma, but cannot rule out septic joint or additional traumatic injury   - Right knee x-rays from 04/25/2022 reviewed without evidence of underlying fracture, but there was suprapatellar soft tissue swelling noted  - CT right lower extremity from 04/26/2022 shows right knee hematoma without active extravasation   - MRI right knee from 4/28/22 shows no acute internal derangement with bony contusion of the proximal tibia and fibula and anteromedial right knee hematoma  - Appreciate Orthopedic surgery evaluation and recommendations  - Continue to monitor neurovascular exam; no evidence of compartment syndrome at this time  - Continue multimodal analgesic regimen

## 2022-04-29 NOTE — QUICK NOTE
MRI of the left knee was reviewed  This demonstrates a lateral meniscal tear separation of the meniscocapsular junction  Also ACL tear, partial PCL tear, LCL, biceps femoris tendon complete tears  Representing high-grade posterior lateral corner injury  Rupture of IT band from distal insertion  Grade 1 MCL sprain  Recommend continuing with knee immobilizer and nonweightbearing left lower extremity  Patient will require follow-up with sports medicine surgeon on discharge  Follow-up with Dr Annita Cabral for foot and ankle injuries as previously instructed  Regards the right lower extremity, patient may be weight-bearing as tolerated no surgical intervention indicated at this time for the right thigh hematoma  Orthopedics signing off please call with questions

## 2022-04-29 NOTE — PHYSICAL THERAPY NOTE
Physical Therapy Cancellation Note         04/29/22 1434   PT Last Visit   PT Visit Date 04/29/22   Note Type   Note Type Cancelled Session   Cancel Reasons Medical status;  Nurse reports pt with Hgb 5 6, getting first unit of blood shortly; Will follow up as schedule allows and pt appropriate         Lou Stanley, PT
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
## 2022-04-29 NOTE — PLAN OF CARE
Problem: Prexisting or High Potential for Compromised Skin Integrity  Goal: Skin integrity is maintained or improved  Description: INTERVENTIONS:  - Identify patients at risk for skin breakdown  - Assess and monitor skin integrity  - Assess and monitor nutrition and hydration status  - Monitor labs   - Assess for incontinence   - Turn and reposition patient  - Assist with mobility/ambulation  - Relieve pressure over bony prominences  - Avoid friction and shearing  - Provide appropriate hygiene as needed including keeping skin clean and dry  - Evaluate need for skin moisturizer/barrier cream  - Collaborate with interdisciplinary team   - Patient/family teaching  - Consider wound care consult   Outcome: Progressing     Problem: MOBILITY - ADULT  Goal: Maintain or return to baseline ADL function  Description: INTERVENTIONS:  -  Assess patient's ability to carry out ADLs; assess patient's baseline for ADL function and identify physical deficits which impact ability to perform ADLs (bathing, care of mouth/teeth, toileting, grooming, dressing, etc )  - Assess/evaluate cause of self-care deficits   - Assess range of motion  - Assess patient's mobility; develop plan if impaired  - Assess patient's need for assistive devices and provide as appropriate  - Encourage maximum independence but intervene and supervise when necessary  - Involve family in performance of ADLs  - Assess for home care needs following discharge   - Consider OT consult to assist with ADL evaluation and planning for discharge  - Provide patient education as appropriate  Outcome: Progressing  Goal: Maintains/Returns to pre admission functional level  Description: INTERVENTIONS:  - Perform BMAT or MOVE assessment daily    - Set and communicate daily mobility goal to care team and patient/family/caregiver     - Collaborate with rehabilitation services on mobility goals if consulted  - Out of bed for meals 3 times a day  - Out of bed for toileting  - Record patient progress and toleration of activity level   Outcome: Progressing     Problem: Potential for Falls  Goal: Patient will remain free of falls  Description: INTERVENTIONS:  - Educate patient/family on patient safety including physical limitations  - Instruct patient to call for assistance with activity   - Consult OT/PT to assist with strengthening/mobility   - Keep Call bell within reach  - Keep bed low and locked with side rails adjusted as appropriate  - Keep care items and personal belongings within reach  - Initiate and maintain comfort rounds  - Make Fall Risk Sign visible to staff  - Initiate/Maintain bed/chair alarm  - Obtain necessary fall risk management equipment  - Apply yellow socks and bracelet for high fall risk patients  - Consider moving patient to room near nurses station  Outcome: Progressing     Problem: Nutrition/Hydration-ADULT  Goal: Nutrient/Hydration intake appropriate for improving, restoring or maintaining nutritional needs  Description: Monitor and assess patient's nutrition/hydration status for malnutrition  Collaborate with interdisciplinary team and initiate plan and interventions as ordered  Monitor patient's weight and dietary intake as ordered or per policy  Utilize nutrition screening tool and intervene as necessary  Determine patient's food preferences and provide high-protein, high-caloric foods as appropriate       INTERVENTIONS:  - Monitor oral intake, urinary output, labs, and treatment plans  - Assess nutrition and hydration status and recommend course of action  - Evaluate amount of meals eaten  - Assist patient with eating if necessary   - Allow adequate time for meals  - Recommend/ encourage appropriate diets, oral nutritional supplements, and vitamin/mineral supplements  - Order, calculate, and assess calorie counts as needed  - Recommend, monitor, and adjust tube feedings and TPN/PPN based on assessed needs  - Assess need for intravenous fluids  - Provide specific nutrition/hydration education as appropriate  - Include patient/family/caregiver in decisions related to nutrition  Outcome: Progressing
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
## 2022-04-29 NOTE — ASSESSMENT & PLAN NOTE
- Right-sided pneumothorax, present on admission   - Management of chest wall contusion as noted  No obvious rib fractures noted  - Continue to encourage incentive spirometer use and adequate pulmonary hygiene  Patient with PIC score of 9   - Supplemental oxygen via nasal cannula as needed  On room air, no supplemental oxygen required  - Repeat chest x-ray on 4/26/2022 reviewed showing stability of the right pneumothorax  Plan for repeat imaging in approximately 1 week/prior to trauma clinic follow-up  - Outpatient follow-up in the trauma clinic for re-evaluation in approximately 2 weeks with repeat chest x-ray at that time
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
## 2022-04-29 NOTE — ASSESSMENT & PLAN NOTE
- Acute nondisplaced left tibial plateau fracture, present on admission  Based on left knee instability and physical exam, patient likely to have ligamentous injuries  - Appreciate formal Orthopedic surgery evaluation and recommendations  - F/u MRI L knee read, completed on 4/29/22  - Maintain NON-weightbearing status on the left lower extremity in knee immobilizer  - Monitor left lower extremity neurovascular exam   - Continue multimodal analgesic regimen   - Continue DVT prophylaxis  - PT and OT evaluation and treatment as indicated  - Outpatient follow up with Orthopedic surgery for re-evaluation

## 2022-04-30 ENCOUNTER — HOSPITAL ENCOUNTER (INPATIENT)
Dept: VASCULAR ULTRASOUND | Facility: HOSPITAL | Age: 37
Discharge: HOME | DRG: 135 | End: 2022-04-30
Payer: COMMERCIAL

## 2022-04-30 PROBLEM — R50.9 FEVER: Status: ACTIVE | Noted: 2022-04-30

## 2022-04-30 PROBLEM — D62 ACUTE BLOOD LOSS ANEMIA: Status: ACTIVE | Noted: 2022-04-30

## 2022-04-30 LAB
ABO GROUP BLD BPU: NORMAL
ABO GROUP BLD BPU: NORMAL
ANION GAP SERPL CALCULATED.3IONS-SCNC: 7 MMOL/L (ref 4–13)
BASOPHILS # BLD AUTO: 0.04 THOUSANDS/ΜL (ref 0–0.1)
BASOPHILS NFR BLD AUTO: 0 % (ref 0–1)
BPU ID: NORMAL
BPU ID: NORMAL
BUN SERPL-MCNC: 14 MG/DL (ref 5–25)
CALCIUM SERPL-MCNC: 8.2 MG/DL (ref 8.3–10.1)
CHLORIDE SERPL-SCNC: 101 MMOL/L (ref 100–108)
CO2 SERPL-SCNC: 27 MMOL/L (ref 21–32)
CREAT SERPL-MCNC: 0.93 MG/DL (ref 0.6–1.3)
CROSSMATCH: NORMAL
CROSSMATCH: NORMAL
EOSINOPHIL # BLD AUTO: 0.08 THOUSAND/ΜL (ref 0–0.61)
EOSINOPHIL NFR BLD AUTO: 1 % (ref 0–6)
ERYTHROCYTE [DISTWIDTH] IN BLOOD BY AUTOMATED COUNT: 13.6 % (ref 11.6–15.1)
FLUAV RNA RESP QL NAA+PROBE: NEGATIVE
FLUBV RNA RESP QL NAA+PROBE: NEGATIVE
GFR SERPL CREATININE-BSD FRML MDRD: 104 ML/MIN/1.73SQ M
GLUCOSE SERPL-MCNC: 121 MG/DL (ref 65–140)
HCT VFR BLD AUTO: 23.3 % (ref 36.5–49.3)
HCT VFR BLD AUTO: 23.6 % (ref 36.5–49.3)
HGB BLD-MCNC: 8.1 G/DL (ref 12–17)
HGB BLD-MCNC: 8.4 G/DL (ref 12–17)
IMM GRANULOCYTES # BLD AUTO: 0.14 THOUSAND/UL (ref 0–0.2)
IMM GRANULOCYTES NFR BLD AUTO: 1 % (ref 0–2)
LYMPHOCYTES # BLD AUTO: 1.13 THOUSANDS/ΜL (ref 0.6–4.47)
LYMPHOCYTES NFR BLD AUTO: 11 % (ref 14–44)
MCH RBC QN AUTO: 32.8 PG (ref 26.8–34.3)
MCHC RBC AUTO-ENTMCNC: 34.8 G/DL (ref 31.4–37.4)
MCV RBC AUTO: 94 FL (ref 82–98)
MONOCYTES # BLD AUTO: 0.94 THOUSAND/ΜL (ref 0.17–1.22)
MONOCYTES NFR BLD AUTO: 9 % (ref 4–12)
NEUTROPHILS # BLD AUTO: 8.23 THOUSANDS/ΜL (ref 1.85–7.62)
NEUTS SEG NFR BLD AUTO: 78 % (ref 43–75)
NRBC BLD AUTO-RTO: 0 /100 WBCS
PLATELET # BLD AUTO: 223 THOUSANDS/UL (ref 149–390)
PMV BLD AUTO: 9.7 FL (ref 8.9–12.7)
POTASSIUM SERPL-SCNC: 4.1 MMOL/L (ref 3.5–5.3)
RBC # BLD AUTO: 2.47 MILLION/UL (ref 3.88–5.62)
RSV RNA RESP QL NAA+PROBE: NEGATIVE
SARS-COV-2 RNA RESP QL NAA+PROBE: NEGATIVE
SODIUM SERPL-SCNC: 135 MMOL/L (ref 136–145)
UNIT DISPENSE STATUS: NORMAL
UNIT DISPENSE STATUS: NORMAL
UNIT PRODUCT CODE: NORMAL
UNIT PRODUCT CODE: NORMAL
UNIT PRODUCT VOLUME: 300 ML
UNIT PRODUCT VOLUME: 300 ML
UNIT RH: NORMAL
UNIT RH: NORMAL
WBC # BLD AUTO: 10.56 THOUSAND/UL (ref 4.31–10.16)

## 2022-04-30 PROCEDURE — 97116 GAIT TRAINING THERAPY: CPT

## 2022-04-30 PROCEDURE — 85025 COMPLETE CBC W/AUTO DIFF WBC: CPT | Performed by: PHYSICIAN ASSISTANT

## 2022-04-30 PROCEDURE — 85014 HEMATOCRIT: CPT | Performed by: PHYSICIAN ASSISTANT

## 2022-04-30 PROCEDURE — NC001 PR NO CHARGE: Performed by: PHYSICIAN ASSISTANT

## 2022-04-30 PROCEDURE — 97535 SELF CARE MNGMENT TRAINING: CPT

## 2022-04-30 PROCEDURE — 93970 EXTREMITY STUDY: CPT

## 2022-04-30 PROCEDURE — 99232 SBSQ HOSP IP/OBS MODERATE 35: CPT | Performed by: SURGERY

## 2022-04-30 PROCEDURE — 0241U HB NFCT DS VIR RESP RNA 4 TRGT: CPT | Performed by: PHYSICIAN ASSISTANT

## 2022-04-30 PROCEDURE — 97530 THERAPEUTIC ACTIVITIES: CPT

## 2022-04-30 PROCEDURE — 85018 HEMOGLOBIN: CPT | Performed by: PHYSICIAN ASSISTANT

## 2022-04-30 PROCEDURE — 80048 BASIC METABOLIC PNL TOTAL CA: CPT | Performed by: PHYSICIAN ASSISTANT

## 2022-04-30 PROCEDURE — 93970 EXTREMITY STUDY: CPT | Performed by: SURGERY

## 2022-04-30 RX ORDER — IBUPROFEN 400 MG/1
400 TABLET ORAL EVERY 6 HOURS PRN
Status: DISCONTINUED | OUTPATIENT
Start: 2022-04-30 | End: 2022-05-03 | Stop reason: HOSPADM

## 2022-04-30 RX ADMIN — ENOXAPARIN SODIUM 30 MG: 30 INJECTION SUBCUTANEOUS at 06:44

## 2022-04-30 RX ADMIN — SENNOSIDES AND DOCUSATE SODIUM 2 TABLET: 50; 8.6 TABLET ORAL at 08:34

## 2022-04-30 RX ADMIN — ENOXAPARIN SODIUM 30 MG: 30 INJECTION SUBCUTANEOUS at 17:37

## 2022-04-30 RX ADMIN — METHOCARBAMOL 750 MG: 750 TABLET ORAL at 06:44

## 2022-04-30 RX ADMIN — HYDROMORPHONE HYDROCHLORIDE 0.5 MG: 1 INJECTION, SOLUTION INTRAMUSCULAR; INTRAVENOUS; SUBCUTANEOUS at 11:14

## 2022-04-30 RX ADMIN — ACETAMINOPHEN 975 MG: 325 TABLET ORAL at 13:09

## 2022-04-30 RX ADMIN — IBUPROFEN 400 MG: 400 TABLET ORAL at 20:12

## 2022-04-30 RX ADMIN — GABAPENTIN 100 MG: 100 CAPSULE ORAL at 21:17

## 2022-04-30 RX ADMIN — LIDOCAINE 5% 1 PATCH: 700 PATCH TOPICAL at 08:34

## 2022-04-30 RX ADMIN — POLYETHYLENE GLYCOL 3350 17 G: 17 POWDER, FOR SOLUTION ORAL at 08:35

## 2022-04-30 RX ADMIN — METHOCARBAMOL 750 MG: 750 TABLET ORAL at 17:37

## 2022-04-30 RX ADMIN — METHOCARBAMOL 750 MG: 750 TABLET ORAL at 00:35

## 2022-04-30 RX ADMIN — HYDROMORPHONE HYDROCHLORIDE 2 MG: 2 TABLET ORAL at 03:33

## 2022-04-30 RX ADMIN — HYDROMORPHONE HYDROCHLORIDE 0.5 MG: 1 INJECTION, SOLUTION INTRAMUSCULAR; INTRAVENOUS; SUBCUTANEOUS at 23:16

## 2022-04-30 RX ADMIN — ACETAMINOPHEN 975 MG: 325 TABLET ORAL at 21:17

## 2022-04-30 RX ADMIN — GABAPENTIN 100 MG: 100 CAPSULE ORAL at 17:37

## 2022-04-30 RX ADMIN — ACETAMINOPHEN 975 MG: 325 TABLET ORAL at 06:44

## 2022-04-30 RX ADMIN — KETAMINE HYDROCHLORIDE 0.1 MG/KG/HR: 50 INJECTION, SOLUTION INTRAMUSCULAR; INTRAVENOUS at 06:48

## 2022-04-30 RX ADMIN — GABAPENTIN 100 MG: 100 CAPSULE ORAL at 08:34

## 2022-04-30 RX ADMIN — HYDROMORPHONE HYDROCHLORIDE 0.5 MG: 1 INJECTION, SOLUTION INTRAMUSCULAR; INTRAVENOUS; SUBCUTANEOUS at 19:40

## 2022-04-30 NOTE — ASSESSMENT & PLAN NOTE
- Right-sided pneumothorax, present on admission   - Management of chest wall contusion as noted  No obvious rib fractures noted  - Continue to encourage incentive spirometer use and adequate pulmonary hygiene  Patient with PIC score of 9   - Supplemental oxygen via nasal cannula as needed  On room air, no supplemental oxygen required  - Repeat chest x-ray on 4/29/2022 reviewed showing resolution of the right pneumothorax  - Outpatient follow-up in the trauma clinic for re-evaluation in approximately 2 weeks with repeat chest x-ray at that time
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
## 2022-04-30 NOTE — ASSESSMENT & PLAN NOTE
- Acute nondisplaced left tibial plateau fracture, present on admission  Based on left knee instability and physical exam, patient likely to have ligamentous injuries  - Appreciate formal Orthopedic surgery evaluation and recommendations  - Significant ligamentous injury noted in left knee on MRI from 4/29  Patient will require follow-up with Dr Bubba Boone min this upcoming Tuesday or Wednesday to evaluate and to discuss surgical planning   - Maintain NON-weightbearing status on the left lower extremity in knee immobilizer  - Monitor left lower extremity neurovascular exam   - Continue multimodal analgesic regimen   - Continue DVT prophylaxis  - PT and OT evaluation and treatment as indicated  - Outpatient follow up with Orthopedic surgery for re-evaluation

## 2022-04-30 NOTE — PLAN OF CARE
Problem: OCCUPATIONAL THERAPY ADULT  Goal: Performs self-care activities at highest level of function for planned discharge setting  See evaluation for individualized goals  Description: Treatment Interventions: ADL retraining,Functional transfer training,Endurance training,Equipment evaluation/education,Patient/family training,Compensatory technique education,Continued evaluation,Activityengagement,Energy conservation  Equipment Recommended: Bedside commode,Shower/Tub chair with back ($)       See flowsheet documentation for full assessment, interventions and recommendations  Note: Limitation: Decreased ADL status,Decreased endurance,Decreased self-care trans,Decreased high-level ADLs (increased pain)     Assessment: Patient was agreeable to therapy  Patient performed bed mobility to EOB at Mod A  Patient ambulated with RW to coomode 6 feet at Mod A and 6 feet back to bedside at Mod A with RW  Patient performed STS transfers at Mod A at EOB abd commode  Patient performed LB dressing and toileting at Max A  Patient was returned to bed with all needs met and call bell within reach   Continue skilled OT services till DC     OT Discharge Recommendation:  (home using RWvs rehab pend progress stair and med)

## 2022-04-30 NOTE — PROGRESS NOTES
5 sutures in total removed from two small lacerations surrounding the left eye  Patient tolerated this well  Wounds are well healed

## 2022-04-30 NOTE — PHYSICAL THERAPY NOTE
PHYSICAL THERAPY NOTE    Patient Name: Ellen JAMES Date: 22 1106   PT Last Visit   PT Visit Date 22   Note Type   Note Type Treatment   Pain Assessment   Pain Assessment Tool 0-10   Pain Score 8   Hospital Pain Intervention(s) Repositioned;Cold applied; Ambulation/increased activity   Restrictions/Precautions   Weight Bearing Precautions Per Order Yes   LLE Weight Bearing Per Order NWB  (in knee immobilizer and [place])   Braces or Orthoses LE Immobilizer;CAM Boot  (LLE)   Other Precautions WBS; Fall Risk;Pain  (language barrier)   General   Chart Reviewed Yes   Response to Previous Treatment Patient with no complaints from previous session  Family/Caregiver Present Yes   Cognition   Overall Cognitive Status WFL   Arousal/Participation Alert; Cooperative   Attention Attends with cues to redirect   Comments Pt identified by name and   Subjective   Subjective Agrees to PT evaluation and is cooperative throughout session  "It's harder than I thought  I feel a pressure in the back of my knee "   Bed Mobility   Supine to Sit 3  Moderate assistance   Additional items Assist x 1;HOB elevated; Bedrails; Increased time required;Verbal cues;LE management   Sit to Supine 4  Minimal assistance   Additional items Assist x 1; Increased time required;Verbal cues;LE management   Additional Comments Returned to supine after session with knee immobilizer unstrapped due to discomfort  Education provided to maintain knee extension and refit KI if mobilizing  Transfers   Sit to Stand 3  Moderate assistance   Additional items Assist x 1; Increased time required;Verbal cues  (hand and foot placement)   Stand to Sit 4  Minimal assistance   Additional items Assist x 1; Increased time required;Verbal cues  (hand and foot placement)   Ambulation/Elevation   Gait pattern Forward Flexion;Decreased foot clearance; Short stride; Excessively slow   Gait Assistance 3  Moderate assist  (min to mod A x1; gait degradation with increasing distance)   Additional items Assist x 1  (2nd assist for line management/equipment and safety)   Assistive Device Rolling walker   Distance 12` x2   Stair Management Assistance Not tested  (unable to tolerate due to fatigue/pain after ambulation)   Ambulation/Elevation Additional Comments increased difficulty maintaining NWB status on return trip from commode to bed due to fatigue   Balance   Static Sitting Fair +   Dynamic Sitting Fair   Static Standing Fair -   Dynamic Standing Poor +   Ambulatory Poor   Endurance Deficit   Endurance Deficit Yes   Endurance Deficit Description limited ambulation distance, fatigue, pain   Activity Tolerance   Activity Tolerance Patient limited by pain; Patient limited by fatigue   Nurse Made Aware Per RN, pt appropriate to treat   Assessment   Prognosis Good   Problem List Decreased strength;Decreased endurance; Impaired balance;Decreased mobility;Pain;Orthopedic restrictions   Assessment Pt agrees to PT treatment and is cooperative throughout session  Education provided on potential stair negotiation options  Progress noted this session as pt is able to ambulate increased distance with decreased level of assist, however continues to require 2nd assist for line/equipment management and safety  Gait degradation noted with increasing difficulty maintaining LLE NWB due to fatigue  Pt able to transfer on and off commode, however requires assist for hygiene care due to need for BUE support on RW during stance  Extensive amount of time repositioning pt and LLE in bed due to increasing discomfort  Knee immobilizer left unstrapped as pt was found and education provided to maintain knee extension/don KI if attempting to mobilize  Ice packs provided for comfort  Will continue to benefit from ongoing skilled PT to maximize his functional mobility and increase his level of independence      Goals   Patient Goals to go home   STG Expiration Date 05/05/22   PT Treatment Day 2   Plan   Treatment/Interventions Functional transfer training;LE strengthening/ROM; Elevations; Therapeutic exercise; Endurance training;Patient/family training;Equipment eval/education; Bed mobility;Gait training   Progress Progressing toward goals   PT Frequency 4-6x/wk   Recommendation   PT Discharge Recommendation Post acute rehabilitation services   Equipment Recommended [address] [facility] Recommended Wheeled walker   [facility] Basic Mobility Inpatient   Turning in Bed Without Bedrails 2   Lying on Back to Sitting on Edge of Flat Bed 2   Moving Bed to Chair 2   Standing Up From Chair 2   Walk in Room 2   Climb 3-5 Stairs 2   Basic Mobility Inpatient Raw Score 12   Basic Mobility Standardized Score 32 23   Highest Level Of Mobility   -[facility] Goal 4: Move to chair/commode   -[facility] Highest Level of Mobility 6: Walk 10 steps or more   -[facility] Goal Achieved Yes   Education   Education Provided Mobility training;Assistive device   Patient Reinforcement needed   End of Consult   Patient Position at End of Consult Supine; All needs within reach   The patient's [facility] Basic Mobility Inpatient Short Form Raw Score is 12, Standardized Score is 32 23  A standardized score less than 40 78 or Raw Score of 16 suggests the patient may benefit from discharge to post-acute rehabilitation services, which DOES coincide with CURRENT above PT recommendations  However please refer to therapist recommendation for discharge planning given other factors that may influence destination  Adapted from Nithya Mullins [employer] [facility] 6-Clicks Basic Mobility and Daily Activity Scores With Discharge Destination  Physical Therapy, 2021;101:1-9   DOI: [id number]    Maliha Naranjo PT,DPT

## 2022-04-30 NOTE — ASSESSMENT & PLAN NOTE
-hemoglobin drifted to 5 6 on 04/29    He received 2 units of packed red blood cells at that time   -hemoglobin responded appropriately to 8 3 and a stable 8 1 this morning   -anemia is asymptomatic  -no signs of ongoing bleeding clinically  -no need for further transfusion at this time
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
## 2022-04-30 NOTE — PLAN OF CARE
Problem: PHYSICAL THERAPY ADULT  Goal: Performs mobility at highest level of function for planned discharge setting  See evaluation for individualized goals  Description: Treatment/Interventions: Functional transfer training,LE strengthening/ROM,Elevations,Therapeutic exercise,Endurance training,Patient/family training,Equipment eval/education,Bed mobility,Gait training,Spoke to nursing  Equipment Recommended: Musa Flores       See flowsheet documentation for full assessment, interventions and recommendations  Outcome: Progressing  Note: Prognosis: Good  Problem List: Decreased strength,Decreased endurance,Impaired balance,Decreased mobility,Pain,Orthopedic restrictions  Assessment: Pt agrees to PT treatment and is cooperative throughout session  Education provided on potential stair negotiation options  Progress noted this session as pt is able to ambulate increased distance with decreased level of assist, however continues to require 2nd assist for line/equipment management and safety  Gait degradation noted with increasing difficulty maintaining LLE NWB due to fatigue  Pt able to transfer on and off commode, however requires assist for hygiene care due to need for BUE support on RW during stance  Extensive amount of time repositioning pt and LLE in bed due to increasing discomfort  Knee immobilizer left unstrapped as pt was found and education provided to maintain knee extension/don KI if attempting to mobilize  Ice packs provided for comfort  Will continue to benefit from ongoing skilled PT to maximize his functional mobility and increase his level of independence  PT Discharge Recommendation: Post acute rehabilitation services          See flowsheet documentation for full assessment
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
## 2022-04-30 NOTE — ASSESSMENT & PLAN NOTE
-T-max 102 3° F overnight on 04/29  -chest x-ray negative  COVID/flu negative    Lower extremity venous duplex pending   -patient is afebrile this morning without respiratory complaints or signs of infection otherwise  -no leukocytosis  -continue to monitor fever curve follow-up duplex study
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
## 2022-04-30 NOTE — PLAN OF CARE
Problem: Prexisting or High Potential for Compromised Skin Integrity  Goal: Skin integrity is maintained or improved  Description: INTERVENTIONS:  - Identify patients at risk for skin breakdown  - Assess and monitor skin integrity  - Assess and monitor nutrition and hydration status  - Monitor labs   - Assess for incontinence   - Turn and reposition patient  - Assist with mobility/ambulation  - Relieve pressure over bony prominences  - Avoid friction and shearing  - Provide appropriate hygiene as needed including keeping skin clean and dry  - Evaluate need for skin moisturizer/barrier cream  - Collaborate with interdisciplinary team   - Patient/family teaching  - Consider wound care consult   Outcome: Progressing     Problem: Potential for Falls  Goal: Patient will remain free of falls  Description: INTERVENTIONS:  - Educate patient/family on patient safety including physical limitations  - Instruct patient to call for assistance with activity   - Consult OT/PT to assist with strengthening/mobility   - Keep Call bell within reach  - Keep bed low and locked with side rails adjusted as appropriate  - Keep care items and personal belongings within reach  - Initiate and maintain comfort rounds  - Make Fall Risk Sign visible to staff  - Apply yellow socks and bracelet for high fall risk patients  - Consider moving patient to room near nurses station  Outcome: Progressing     Problem: Nutrition/Hydration-ADULT  Goal: Nutrient/Hydration intake appropriate for improving, restoring or maintaining nutritional needs  Description: Monitor and assess patient's nutrition/hydration status for malnutrition  Collaborate with interdisciplinary team and initiate plan and interventions as ordered  Monitor patient's weight and dietary intake as ordered or per policy  Utilize nutrition screening tool and intervene as necessary  Determine patient's food preferences and provide high-protein, high-caloric foods as appropriate  INTERVENTIONS:  - Monitor oral intake, urinary output, labs, and treatment plans  - Assess nutrition and hydration status and recommend course of action  - Evaluate amount of meals eaten  - Assist patient with eating if necessary   - Allow adequate time for meals  - Recommend/ encourage appropriate diets, oral nutritional supplements, and vitamin/mineral supplements  - Order, calculate, and assess calorie counts as needed  - Recommend, monitor, and adjust tube feedings and TPN/PPN based on assessed needs  - Assess need for intravenous fluids  - Provide specific nutrition/hydration education as appropriate  - Include patient/family/caregiver in decisions related to nutrition  Outcome: Progressing     Problem: MOBILITY - ADULT  Goal: Maintains/Returns to pre admission functional level  Description: INTERVENTIONS:  - Perform BMAT or MOVE assessment daily    - Set and communicate daily mobility goal to care team and patient/family/caregiver  - Collaborate with rehabilitation services on mobility goals if consulted  - Perform Range of Motion   - Reposition patient every 2 hours    - Dangle patient 1 times a day  - Stand patient 1 times a day  - Out of bed to chair 1 times a day   - Out of bed for toileting  - Record patient progress and toleration of activity level   Outcome: Progressing

## 2022-04-30 NOTE — PROGRESS NOTES
Pt refusing to have dressings changed to wounds - requested to change these dressings multiple times - instructed patient on consequences of not having dressings changed including risk for infection - pt states he is aware

## 2022-05-01 ENCOUNTER — APPOINTMENT (INPATIENT)
Dept: CT IMAGING | Facility: HOSPITAL | Age: 37
DRG: 135 | End: 2022-05-01
Payer: COMMERCIAL

## 2022-05-01 PROBLEM — S80.01XA HEMATOMA OF RIGHT KNEE REGION: Status: ACTIVE | Noted: 2022-04-26

## 2022-05-01 LAB
ALBUMIN SERPL BCP-MCNC: 2.5 G/DL (ref 3.5–5)
ALP SERPL-CCNC: 51 U/L (ref 46–116)
ALT SERPL W P-5'-P-CCNC: 63 U/L (ref 12–78)
ANION GAP SERPL CALCULATED.3IONS-SCNC: 7 MMOL/L (ref 4–13)
AST SERPL W P-5'-P-CCNC: 39 U/L (ref 5–45)
BACTERIA BLD CULT: NORMAL
BACTERIA BLD CULT: NORMAL
BACTERIA UR QL AUTO: ABNORMAL /HPF
BASOPHILS # BLD AUTO: 0.03 THOUSANDS/ΜL (ref 0–0.1)
BASOPHILS NFR BLD AUTO: 0 % (ref 0–1)
BILIRUB DIRECT SERPL-MCNC: 0.29 MG/DL (ref 0–0.2)
BILIRUB SERPL-MCNC: 1.22 MG/DL (ref 0.2–1)
BILIRUB UR QL STRIP: NEGATIVE
BUN SERPL-MCNC: 16 MG/DL (ref 5–25)
CALCIUM SERPL-MCNC: 8 MG/DL (ref 8.3–10.1)
CHLORIDE SERPL-SCNC: 103 MMOL/L (ref 100–108)
CLARITY UR: CLEAR
CO2 SERPL-SCNC: 26 MMOL/L (ref 21–32)
COLOR UR: YELLOW
CREAT SERPL-MCNC: 0.88 MG/DL (ref 0.6–1.3)
EOSINOPHIL # BLD AUTO: 0.19 THOUSAND/ΜL (ref 0–0.61)
EOSINOPHIL NFR BLD AUTO: 2 % (ref 0–6)
ERYTHROCYTE [DISTWIDTH] IN BLOOD BY AUTOMATED COUNT: 13.7 % (ref 11.6–15.1)
GFR SERPL CREATININE-BSD FRML MDRD: 109 ML/MIN/1.73SQ M
GLUCOSE SERPL-MCNC: 107 MG/DL (ref 65–140)
GLUCOSE UR STRIP-MCNC: NEGATIVE MG/DL
HCT VFR BLD AUTO: 24.1 % (ref 36.5–49.3)
HGB BLD-MCNC: 8.2 G/DL (ref 12–17)
HGB UR QL STRIP.AUTO: NEGATIVE
IMM GRANULOCYTES # BLD AUTO: 0.11 THOUSAND/UL (ref 0–0.2)
IMM GRANULOCYTES NFR BLD AUTO: 1 % (ref 0–2)
KETONES UR STRIP-MCNC: NEGATIVE MG/DL
LEUKOCYTE ESTERASE UR QL STRIP: NEGATIVE
LIPASE SERPL-CCNC: 142 U/L (ref 73–393)
LYMPHOCYTES # BLD AUTO: 1.24 THOUSANDS/ΜL (ref 0.6–4.47)
LYMPHOCYTES NFR BLD AUTO: 11 % (ref 14–44)
MCH RBC QN AUTO: 32.5 PG (ref 26.8–34.3)
MCHC RBC AUTO-ENTMCNC: 34 G/DL (ref 31.4–37.4)
MCV RBC AUTO: 96 FL (ref 82–98)
MONOCYTES # BLD AUTO: 1.05 THOUSAND/ΜL (ref 0.17–1.22)
MONOCYTES NFR BLD AUTO: 10 % (ref 4–12)
MUCOUS THREADS UR QL AUTO: ABNORMAL
NEUTROPHILS # BLD AUTO: 8.45 THOUSANDS/ΜL (ref 1.85–7.62)
NEUTS SEG NFR BLD AUTO: 76 % (ref 43–75)
NITRITE UR QL STRIP: NEGATIVE
NON-SQ EPI CELLS URNS QL MICRO: ABNORMAL /HPF
NRBC BLD AUTO-RTO: 0 /100 WBCS
PH UR STRIP.AUTO: 6 [PH]
PLATELET # BLD AUTO: 279 THOUSANDS/UL (ref 149–390)
PMV BLD AUTO: 9.8 FL (ref 8.9–12.7)
POTASSIUM SERPL-SCNC: 3.9 MMOL/L (ref 3.5–5.3)
PROT SERPL-MCNC: 6.4 G/DL (ref 6.4–8.2)
PROT UR STRIP-MCNC: ABNORMAL MG/DL
RBC # BLD AUTO: 2.52 MILLION/UL (ref 3.88–5.62)
RBC #/AREA URNS AUTO: ABNORMAL /HPF
SODIUM SERPL-SCNC: 136 MMOL/L (ref 136–145)
SP GR UR STRIP.AUTO: >=1.03 (ref 1–1.03)
UROBILINOGEN UR QL STRIP.AUTO: 2 E.U./DL
WBC # BLD AUTO: 11.07 THOUSAND/UL (ref 4.31–10.16)
WBC #/AREA URNS AUTO: ABNORMAL /HPF

## 2022-05-01 PROCEDURE — 70486 CT MAXILLOFACIAL W/O DYE: CPT

## 2022-05-01 PROCEDURE — 83690 ASSAY OF LIPASE: CPT | Performed by: PHYSICIAN ASSISTANT

## 2022-05-01 PROCEDURE — 80048 BASIC METABOLIC PNL TOTAL CA: CPT | Performed by: PHYSICIAN ASSISTANT

## 2022-05-01 PROCEDURE — 99232 SBSQ HOSP IP/OBS MODERATE 35: CPT | Performed by: ORTHOPAEDIC SURGERY

## 2022-05-01 PROCEDURE — 85025 COMPLETE CBC W/AUTO DIFF WBC: CPT | Performed by: PHYSICIAN ASSISTANT

## 2022-05-01 PROCEDURE — 99232 SBSQ HOSP IP/OBS MODERATE 35: CPT | Performed by: SURGERY

## 2022-05-01 PROCEDURE — 99232 SBSQ HOSP IP/OBS MODERATE 35: CPT | Performed by: ANESTHESIOLOGY

## 2022-05-01 PROCEDURE — 81001 URINALYSIS AUTO W/SCOPE: CPT | Performed by: PHYSICIAN ASSISTANT

## 2022-05-01 PROCEDURE — G1004 CDSM NDSC: HCPCS

## 2022-05-01 PROCEDURE — 80076 HEPATIC FUNCTION PANEL: CPT | Performed by: PHYSICIAN ASSISTANT

## 2022-05-01 RX ORDER — HYDROMORPHONE HCL/PF 1 MG/ML
0.5 SYRINGE (ML) INJECTION EVERY 6 HOURS PRN
Status: DISCONTINUED | OUTPATIENT
Start: 2022-05-01 | End: 2022-05-02

## 2022-05-01 RX ADMIN — HYDROMORPHONE HYDROCHLORIDE 4 MG: 2 TABLET ORAL at 13:44

## 2022-05-01 RX ADMIN — ENOXAPARIN SODIUM 30 MG: 30 INJECTION SUBCUTANEOUS at 06:08

## 2022-05-01 RX ADMIN — ACETAMINOPHEN 975 MG: 325 TABLET ORAL at 21:05

## 2022-05-01 RX ADMIN — POLYETHYLENE GLYCOL 3350 17 G: 17 POWDER, FOR SOLUTION ORAL at 09:06

## 2022-05-01 RX ADMIN — HYDROMORPHONE HYDROCHLORIDE 0.5 MG: 1 INJECTION, SOLUTION INTRAMUSCULAR; INTRAVENOUS; SUBCUTANEOUS at 06:09

## 2022-05-01 RX ADMIN — IBUPROFEN 400 MG: 400 TABLET ORAL at 09:18

## 2022-05-01 RX ADMIN — LIDOCAINE 5% 1 PATCH: 700 PATCH TOPICAL at 09:06

## 2022-05-01 RX ADMIN — GABAPENTIN 100 MG: 100 CAPSULE ORAL at 21:06

## 2022-05-01 RX ADMIN — GABAPENTIN 100 MG: 100 CAPSULE ORAL at 16:50

## 2022-05-01 RX ADMIN — HYDROMORPHONE HYDROCHLORIDE 0.5 MG: 1 INJECTION, SOLUTION INTRAMUSCULAR; INTRAVENOUS; SUBCUTANEOUS at 16:50

## 2022-05-01 RX ADMIN — METHOCARBAMOL 750 MG: 750 TABLET ORAL at 18:33

## 2022-05-01 RX ADMIN — SENNOSIDES AND DOCUSATE SODIUM 2 TABLET: 50; 8.6 TABLET ORAL at 09:06

## 2022-05-01 RX ADMIN — IBUPROFEN 400 MG: 400 TABLET ORAL at 20:16

## 2022-05-01 RX ADMIN — METHOCARBAMOL 750 MG: 750 TABLET ORAL at 23:01

## 2022-05-01 RX ADMIN — METHOCARBAMOL 750 MG: 750 TABLET ORAL at 01:00

## 2022-05-01 RX ADMIN — ACETAMINOPHEN 975 MG: 325 TABLET ORAL at 14:54

## 2022-05-01 RX ADMIN — ACETAMINOPHEN 975 MG: 325 TABLET ORAL at 06:08

## 2022-05-01 RX ADMIN — HYDROMORPHONE HYDROCHLORIDE 0.5 MG: 1 INJECTION, SOLUTION INTRAMUSCULAR; INTRAVENOUS; SUBCUTANEOUS at 04:12

## 2022-05-01 RX ADMIN — GABAPENTIN 100 MG: 100 CAPSULE ORAL at 09:06

## 2022-05-01 RX ADMIN — METHOCARBAMOL 750 MG: 750 TABLET ORAL at 06:08

## 2022-05-01 RX ADMIN — ENOXAPARIN SODIUM 30 MG: 30 INJECTION SUBCUTANEOUS at 18:33

## 2022-05-01 RX ADMIN — HYDROMORPHONE HYDROCHLORIDE 0.5 MG: 1 INJECTION, SOLUTION INTRAMUSCULAR; INTRAVENOUS; SUBCUTANEOUS at 22:59

## 2022-05-01 RX ADMIN — METHOCARBAMOL 750 MG: 750 TABLET ORAL at 12:24

## 2022-05-01 RX ADMIN — HYDROMORPHONE HYDROCHLORIDE 4 MG: 2 TABLET ORAL at 21:06
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
## 2022-05-01 NOTE — ASSESSMENT & PLAN NOTE
-hemoglobin drifted to 5 6 on 04/29    He received 2 units of packed red blood cells at that time   -hemoglobin responded appropriately to 8 3 and a stable 8 2 this morning   -anemia is asymptomatic  -no signs of ongoing bleeding clinically  -no need for further transfusion at this time

## 2022-05-01 NOTE — PLAN OF CARE
Problem: Prexisting or High Potential for Compromised Skin Integrity  Goal: Skin integrity is maintained or improved  Description: INTERVENTIONS:  - Identify patients at risk for skin breakdown  - Assess and monitor skin integrity  - Assess and monitor nutrition and hydration status  - Monitor labs   - Assess for incontinence   - Turn and reposition patient  - Assist with mobility/ambulation  - Relieve pressure over bony prominences  - Avoid friction and shearing  - Provide appropriate hygiene as needed including keeping skin clean and dry  - Evaluate need for skin moisturizer/barrier cream  - Collaborate with interdisciplinary team   - Patient/family teaching  - Consider wound care consult   Outcome: Progressing     Problem: MOBILITY - ADULT  Goal: Maintain or return to baseline ADL function  Description: INTERVENTIONS:  -  Assess patient's ability to carry out ADLs; assess patient's baseline for ADL function and identify physical deficits which impact ability to perform ADLs (bathing, care of mouth/teeth, toileting, grooming, dressing, etc )  - Assess/evaluate cause of self-care deficits   - Assess range of motion  - Assess patient's mobility; develop plan if impaired  - Assess patient's need for assistive devices and provide as appropriate  - Encourage maximum independence but intervene and supervise when necessary  - Involve family in performance of ADLs  - Assess for home care needs following discharge   - Consider OT consult to assist with ADL evaluation and planning for discharge  - Provide patient education as appropriate  Outcome: Progressing     Problem: Potential for Falls  Goal: Patient will remain free of falls  Description: INTERVENTIONS:  - Educate patient/family on patient safety including physical limitations  - Instruct patient to call for assistance with activity   - Consult OT/PT to assist with strengthening/mobility   - Keep Call bell within reach  - Keep bed low and locked with side rails adjusted as appropriate  - Keep care items and personal belongings within reach  - Initiate and maintain comfort rounds  - Make Fall Risk Sign visible to staff  - Offer Toileting every 2 Hours, in advance of need  - Apply yellow socks and bracelet for high fall risk patients  - Consider moving patient to room near nurses station  Outcome: Progressing     Problem: Nutrition/Hydration-ADULT  Goal: Nutrient/Hydration intake appropriate for improving, restoring or maintaining nutritional needs  Description: Monitor and assess patient's nutrition/hydration status for malnutrition  Collaborate with interdisciplinary team and initiate plan and interventions as ordered  Monitor patient's weight and dietary intake as ordered or per policy  Utilize nutrition screening tool and intervene as necessary  Determine patient's food preferences and provide high-protein, high-caloric foods as appropriate       INTERVENTIONS:  - Monitor oral intake, urinary output, labs, and treatment plans  - Assess nutrition and hydration status and recommend course of action  - Evaluate amount of meals eaten  - Assist patient with eating if necessary   - Allow adequate time for meals  - Recommend/ encourage appropriate diets, oral nutritional supplements, and vitamin/mineral supplements  - Order, calculate, and assess calorie counts as needed  - Recommend, monitor, and adjust tube feedings and TPN/PPN based on assessed needs  - Assess need for intravenous fluids  - Provide specific nutrition/hydration education as appropriate  - Include patient/family/caregiver in decisions related to nutrition  Outcome: Progressing
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
## 2022-05-01 NOTE — ASSESSMENT & PLAN NOTE
- Acute nondisplaced left tibial plateau fracture, present on admission  Based on left knee instability and physical exam, patient likely to have ligamentous injuries  - Appreciate formal Orthopedic surgery evaluation and recommendations  - Significant ligamentous injury noted in left knee on MRI from 4/29  Patient will require follow-up with Dr Red Lynn min this upcoming Tuesday or Wednesday to evaluate and to discuss surgical planning   - Maintain NON-weightbearing status on the left lower extremity in knee immobilizer  - Monitor left lower extremity neurovascular exam   - Continue multimodal analgesic regimen   - Continue DVT prophylaxis  - PT and OT evaluation and treatment as indicated  - Outpatient follow up with Orthopedic surgery for re-evaluation

## 2022-05-01 NOTE — ASSESSMENT & PLAN NOTE
-T-max 102 5° F overnight on 5/22  -chest x-ray negative  COVID/flu negative  Lower extremity venous duplex negative   -patient is afebrile again this morning  No leukocytosis  - LFTs and lipase checked and normal  UA pending  Will obtain CT face to evaluate for sinusitis due to nasal pressure     - continue to monitor fever curve

## 2022-05-01 NOTE — PLAN OF CARE
Problem: Prexisting or High Potential for Compromised Skin Integrity  Goal: Skin integrity is maintained or improved  Description: INTERVENTIONS:  - Identify patients at risk for skin breakdown  - Assess and monitor skin integrity  - Assess and monitor nutrition and hydration status  - Monitor labs   - Assess for incontinence   - Turn and reposition patient  - Assist with mobility/ambulation  - Relieve pressure over bony prominences  - Avoid friction and shearing  - Provide appropriate hygiene as needed including keeping skin clean and dry  - Evaluate need for skin moisturizer/barrier cream  - Collaborate with interdisciplinary team   - Patient/family teaching  - Consider wound care consult   Outcome: Progressing     Problem: MOBILITY - ADULT  Goal: Maintain or return to baseline ADL function  Description: INTERVENTIONS:  -  Assess patient's ability to carry out ADLs; assess patient's baseline for ADL function and identify physical deficits which impact ability to perform ADLs (bathing, care of mouth/teeth, toileting, grooming, dressing, etc )  - Assess/evaluate cause of self-care deficits   - Assess range of motion  - Assess patient's mobility; develop plan if impaired  - Assess patient's need for assistive devices and provide as appropriate  - Encourage maximum independence but intervene and supervise when necessary  - Involve family in performance of ADLs  - Assess for home care needs following discharge   - Consider OT consult to assist with ADL evaluation and planning for discharge  - Provide patient education as appropriate  Outcome: Progressing  Goal: Maintains/Returns to pre admission functional level  Description: INTERVENTIONS:  - Perform BMAT or MOVE assessment daily    - Set and communicate daily mobility goal to care team and patient/family/caregiver     - Collaborate with rehabilitation services on mobility goals if consulte  - Out of bed for toileting  - Record patient progress and toleration of activity level   Outcome: Progressing     Problem: Potential for Falls  Goal: Patient will remain free of falls  Description: INTERVENTIONS:  - Educate patient/family on patient safety including physical limitations  - Instruct patient to call for assistance with activity   - Consult OT/PT to assist with strengthening/mobility   - Keep Call bell within reach  - Keep bed low and locked with side rails adjusted as appropriate  - Keep care items and personal belongings within reach  - Initiate and maintain comfort rounds  - Make Fall Risk Sign visible to staff  - Apply yellow socks and bracelet for high fall risk patients  - Consider moving patient to room near nurses station  Outcome: Progressing     Problem: Nutrition/Hydration-ADULT  Goal: Nutrient/Hydration intake appropriate for improving, restoring or maintaining nutritional needs  Description: Monitor and assess patient's nutrition/hydration status for malnutrition  Collaborate with interdisciplinary team and initiate plan and interventions as ordered  Monitor patient's weight and dietary intake as ordered or per policy  Utilize nutrition screening tool and intervene as necessary  Determine patient's food preferences and provide high-protein, high-caloric foods as appropriate       INTERVENTIONS:  - Monitor oral intake, urinary output, labs, and treatment plans  - Assess nutrition and hydration status and recommend course of action  - Evaluate amount of meals eaten  - Assist patient with eating if necessary   - Allow adequate time for meals  - Recommend/ encourage appropriate diets, oral nutritional supplements, and vitamin/mineral supplements  - Order, calculate, and assess calorie counts as needed  - Recommend, monitor, and adjust tube feedings and TPN/PPN based on assessed needs  - Assess need for intravenous fluids  - Provide specific nutrition/hydration education as appropriate  - Include patient/family/caregiver in decisions related to nutrition  Outcome: Progressing
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
## 2022-05-02 LAB
ANION GAP SERPL CALCULATED.3IONS-SCNC: 9 MMOL/L (ref 4–13)
BASOPHILS # BLD AUTO: 0.03 THOUSANDS/ΜL (ref 0–0.1)
BASOPHILS NFR BLD AUTO: 0 % (ref 0–1)
BUN SERPL-MCNC: 14 MG/DL (ref 5–25)
CALCIUM SERPL-MCNC: 8.3 MG/DL (ref 8.3–10.1)
CHLORIDE SERPL-SCNC: 102 MMOL/L (ref 100–108)
CO2 SERPL-SCNC: 26 MMOL/L (ref 21–32)
CREAT SERPL-MCNC: 0.8 MG/DL (ref 0.6–1.3)
EOSINOPHIL # BLD AUTO: 0.21 THOUSAND/ΜL (ref 0–0.61)
EOSINOPHIL NFR BLD AUTO: 2 % (ref 0–6)
ERYTHROCYTE [DISTWIDTH] IN BLOOD BY AUTOMATED COUNT: 13.5 % (ref 11.6–15.1)
GFR SERPL CREATININE-BSD FRML MDRD: 114 ML/MIN/1.73SQ M
GLUCOSE SERPL-MCNC: 93 MG/DL (ref 65–140)
HCT VFR BLD AUTO: 23.7 % (ref 36.5–49.3)
HGB BLD-MCNC: 7.9 G/DL (ref 12–17)
IMM GRANULOCYTES # BLD AUTO: 0.11 THOUSAND/UL (ref 0–0.2)
IMM GRANULOCYTES NFR BLD AUTO: 1 % (ref 0–2)
LYMPHOCYTES # BLD AUTO: 1.7 THOUSANDS/ΜL (ref 0.6–4.47)
LYMPHOCYTES NFR BLD AUTO: 18 % (ref 14–44)
MCH RBC QN AUTO: 32.1 PG (ref 26.8–34.3)
MCHC RBC AUTO-ENTMCNC: 33.3 G/DL (ref 31.4–37.4)
MCV RBC AUTO: 96 FL (ref 82–98)
MONOCYTES # BLD AUTO: 0.82 THOUSAND/ΜL (ref 0.17–1.22)
MONOCYTES NFR BLD AUTO: 9 % (ref 4–12)
NEUTROPHILS # BLD AUTO: 6.39 THOUSANDS/ΜL (ref 1.85–7.62)
NEUTS SEG NFR BLD AUTO: 70 % (ref 43–75)
NRBC BLD AUTO-RTO: 0 /100 WBCS
PLATELET # BLD AUTO: 348 THOUSANDS/UL (ref 149–390)
PMV BLD AUTO: 9.7 FL (ref 8.9–12.7)
POTASSIUM SERPL-SCNC: 3.8 MMOL/L (ref 3.5–5.3)
RBC # BLD AUTO: 2.46 MILLION/UL (ref 3.88–5.62)
SODIUM SERPL-SCNC: 137 MMOL/L (ref 136–145)
WBC # BLD AUTO: 9.26 THOUSAND/UL (ref 4.31–10.16)

## 2022-05-02 PROCEDURE — 80048 BASIC METABOLIC PNL TOTAL CA: CPT | Performed by: PHYSICIAN ASSISTANT

## 2022-05-02 PROCEDURE — 85025 COMPLETE CBC W/AUTO DIFF WBC: CPT | Performed by: PHYSICIAN ASSISTANT

## 2022-05-02 PROCEDURE — 99232 SBSQ HOSP IP/OBS MODERATE 35: CPT | Performed by: SURGERY

## 2022-05-02 PROCEDURE — 97116 GAIT TRAINING THERAPY: CPT

## 2022-05-02 RX ADMIN — METHOCARBAMOL 750 MG: 750 TABLET ORAL at 23:50

## 2022-05-02 RX ADMIN — ENOXAPARIN SODIUM 30 MG: 30 INJECTION SUBCUTANEOUS at 17:18

## 2022-05-02 RX ADMIN — GABAPENTIN 100 MG: 100 CAPSULE ORAL at 21:18

## 2022-05-02 RX ADMIN — ACETAMINOPHEN 975 MG: 325 TABLET ORAL at 21:18

## 2022-05-02 RX ADMIN — METHOCARBAMOL 750 MG: 750 TABLET ORAL at 05:47

## 2022-05-02 RX ADMIN — GABAPENTIN 100 MG: 100 CAPSULE ORAL at 16:05

## 2022-05-02 RX ADMIN — HYDROMORPHONE HYDROCHLORIDE 4 MG: 2 TABLET ORAL at 12:22

## 2022-05-02 RX ADMIN — ENOXAPARIN SODIUM 30 MG: 30 INJECTION SUBCUTANEOUS at 05:47

## 2022-05-02 RX ADMIN — METHOCARBAMOL 750 MG: 750 TABLET ORAL at 17:18

## 2022-05-02 RX ADMIN — METHOCARBAMOL 750 MG: 750 TABLET ORAL at 11:30

## 2022-05-02 RX ADMIN — GABAPENTIN 100 MG: 100 CAPSULE ORAL at 09:41

## 2022-05-02 RX ADMIN — ACETAMINOPHEN 975 MG: 325 TABLET ORAL at 13:43

## 2022-05-02 RX ADMIN — IBUPROFEN 400 MG: 400 TABLET ORAL at 16:06

## 2022-05-02 RX ADMIN — LIDOCAINE 5% 1 PATCH: 700 PATCH TOPICAL at 09:41

## 2022-05-02 RX ADMIN — ACETAMINOPHEN 975 MG: 325 TABLET ORAL at 05:47

## 2022-05-02 RX ADMIN — HYDROMORPHONE HYDROCHLORIDE 4 MG: 2 TABLET ORAL at 02:39

## 2022-05-02 NOTE — ASSESSMENT & PLAN NOTE
-T-max 102 5° F overnight on 5/22  -no fevers in over 24 hours  No leukocytosis  -chest x-ray negative  COVID/flu negative  Lower extremity venous duplex negative  - LFTs and lipase checked and normal  UA pending  CT face negative for signs of sinusitis on 05/01    -no further workup at this time
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
## 2022-05-02 NOTE — PHYSICAL THERAPY NOTE
PHYSICAL THERAPY NOTE       05/02/22 1405   PT Last Visit   PT Visit Date 05/02/22   Note Type   Note Type Treatment   Pain Assessment   Pain Assessment Tool Mckeon-Baker FACES   Mckeon-Baker FACES Pain Rating 4   Pain Location/Orientation Orientation: Right  (thigh)   Restrictions/Precautions   Weight Bearing Precautions Per Order Yes   RLE Weight Bearing Per Order WBAT   LLE Weight Bearing Per Order NWB   Braces or Orthoses LE Immobilizer;CAM Boot  (L knee immobilizer and L cam boot)   Other Precautions WBS; Fall Risk;Pain   General   Chart Reviewed Yes   Additional Pertinent History Rahul Jmshannon, pt's youngest brother present for PT session, assists with language barrier   Response to Previous Treatment Patient with no complaints from previous session  Family/Caregiver Present Yes   Cognition   Overall Cognitive Status WFL   Arousal/Participation Alert; Cooperative   Attention Within functional limits   Orientation Level Oriented X4   Memory Within functional limits   Following Commands Follows all commands and directions without difficulty   Subjective   Subjective Pt pleasant and agreeable to PT, eager to trial crutches   Bed Mobility   Supine to Sit 5  Supervision   Additional items Assist x 1; Increased time required   Sit to Supine 5  Supervision   Additional items Assist x 1; Increased time required   Additional Comments pt uses BUE to assist LLE on/off bed   Transfers   Sit to Stand 4  Minimal assistance  (pt stood 3x this session, min A for first stand, CGA for 2&3)   Additional items Assist x 1; Increased time required   Stand to Sit 5  Supervision   Additional items Assist x 1; Increased time required   Ambulation/Elevation   Gait pattern Forward Flexion  (VC to stand upright to decrease axillary pressure)   Gait Assistance 5  Supervision   Additional items Assist x 1   Assistive Device Axillary crutches   Distance 35ft with crutches; josué reads 's  No LOB however limited by fatigue  , pt declines further distance or another trial, states he will use crtuches when he goes to the bathroom with nursing staff   Stair Management Assistance Not tested  (demo performed by PT, pt verbalizes understanding)   Ambulation/Elevation Additional Comments Pt declines trial of stairs 2/2 fatigue after ambulation with crutches  PT demo'd ascend/descend with crutches; Pt agreeable to attempt tomorrow morning   Balance   Static Sitting Fair +   Dynamic Sitting Fair +   Static Standing Fair -  (crutches)   Dynamic Standing Fair -  (crutches)   Ambulatory Fair -  (crutches)   Activity Tolerance   Activity Tolerance Patient limited by fatigue   Nurse [address]   Assessment   Prognosis Good   Problem List Decreased strength;Decreased range of motion;Decreased endurance; Impaired balance;Decreased mobility; Decreased skin integrity;Orthopedic restrictions;Pain   Assessment Treatment consisted of bed mobility,balance training, ambulation training, safety awareness, fall prevention, endurance training to increase upright positions and functional mobility  Pt with significantly improved mobility this session, able to ambulate with crutches and perform multiple sit to/from stand transfers; Pt able to tolerate sitting upright for about 20 minutes;  Educated and demo'd step techniques with crutches as well as discussed bumping on buttocks; Pt verbalizes understanding and in agreement to trial tomorrow morning, pt declines trial this session 2/2 fatigue  Pt also states he has multiple people to help him in/out of home  Pt remains supine in bed, at end of session, needs in reach  Nurse aware  The patient's AM-PAC Basic Mobility Inpatient Short Form Raw Score is 20  A Raw score of greater than 17 suggests the patient may benefit from discharge to home with use of crutches/RW for all mobility and HHC PT   Please also refer to the recommendation of the Physical Therapist for safe discharge planning  Goals   Patient Goals To go home   STG Expiration Date 05/16/22   Short Term Goal #1 Pt will: Increase RLE strength 1/2 grade to facilitate independent mobility, perform all bed mobility tasks mod I to decrease fall risk factors, perform all transfers mod I to improve independence, ambulate  100ft  with crutches or RW and supervision w/o LOB to expedite safe return home, tolerate standing for 10 minutes w/ supervision in order to improve balance and help expedite return to previous living environment, tolerate 3 hr OOB in order to improve upright tolerance,  pt will be able to navigate 1 flight of steps by either hopping or bumping w/ supervision in order to return to previous living environment complete exercise program independently  PT Treatment Day 3   Plan   Treatment/Interventions Functional transfer training;ADL retraining;LE strengthening/ROM; Elevations; Endurance training; Therapeutic exercise;Patient/family training;Equipment eval/education; Bed mobility;Gait training; Compensatory technique education;Continued evaluation;Spoke to nursing;OT   Progress Progressing toward goals   PT Frequency 4-6x/wk   Recommendation   PT Discharge Recommendation Home with home health rehabilitation  (will trial steps tomorrow morning; pt has help to get in)   Equipment Recommended [facility] walker   Navdeep Son [address]   Turning in Bed Without Bedrails 4   Lying on Back to Sitting on Edge of Flat Bed 4   Moving Bed to Chair 3   Standing Up From Chair 3   Walk in Room 3   Climb 3-5 Stairs 3   Basic Mobility Inpatient Raw Score 20   Basic Mobility Standardized Score 43 99   Highest Level Of Mobility   JH-HLM Goal 6: Walk 10 steps or more   JH-HLM Highest Level of Mobility 7: Walk 25 feet or more   JH-HLM Goal Achieved Yes     Goldy Smith, PT      Patient Name: Leobardo Sawyer  SAGEQ'Y Date: 5/2/2022
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
## 2022-05-02 NOTE — PROGRESS NOTES
PHYSICAL THERAPY CANCELLATION NOTE    Name: Tori Hernandez  : 1985         05/02/22 1148   PT Last Visit   PT Visit Date 22   Note Type   Note type Cancelled Session   Cancel Reasons Refusal   Additional Comments Chart review performed  Attempted to see pt this morning for treatment session and pt refused due to pain and muscle spasms in both knees  Will cancel session and attempt to f/u this afternoon as schedule allows          Lily Iniguez, SPT

## 2022-05-02 NOTE — PLAN OF CARE
Problem: PHYSICAL THERAPY ADULT  Goal: Performs mobility at highest level of function for planned discharge setting  See evaluation for individualized goals  Description: Treatment/Interventions: Functional transfer training,LE strengthening/ROM,Elevations,Therapeutic exercise,Endurance training,Patient/family training,Equipment eval/education,Bed mobility,Gait training,Spoke to nursing  Equipment Recommended: Aljeandra Cruz       See flowsheet documentation for full assessment, interventions and recommendations  Note: Prognosis: Good  Problem List: Decreased strength,Decreased range of motion,Decreased endurance,Impaired balance,Decreased mobility,Decreased skin integrity,Orthopedic restrictions,Pain  Assessment: Treatment consisted of bed mobility,balance training, ambulation training, safety awareness, fall prevention, endurance training to increase upright positions and functional mobility  Pt with significantly improved mobility this session, able to ambulate with crutches and perform multiple sit to/from stand transfers; Pt able to tolerate sitting upright for about 20 minutes;  Educated and demo'd step techniques with crutches as well as discussed bumping on buttocks; Pt verbalizes understanding and in agreement to trial tomorrow morning, pt declines trial this session 2/2 fatigue  Pt also states he has multiple people to help him in/out of home  Pt remains supine in bed, at end of session, needs in reach  Nurse aware  The patient's AM-PAC Basic Mobility Inpatient Short Form Raw Score is 20  A Raw score of greater than 17 suggests the patient may benefit from discharge to home with use of crutches/RW for all mobility and C PT  Please also refer to the recommendation of the Physical Therapist for safe discharge planning             PT Discharge Recommendation: Home with home health rehabilitation (will trial steps tomorrow morning; pt has help to get in)          See flowsheet documentation for full assessment
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
## 2022-05-02 NOTE — ARC ADMISSION
Referral received for consideration of patient for inpatient acute rehab  Reviewed patient's case with Kaur Steen physician - will continue to follow at this time, awaiting timing of surgical intervention for multiple injuries  Will update as able  Notified by CM that patient is cleared by therapy for discharge home with home health care
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
## 2022-05-02 NOTE — ASSESSMENT & PLAN NOTE
- Acute nondisplaced left tibial plateau fracture, present on admission  Based on left knee instability and physical exam, patient likely to have ligamentous injuries  - Appreciate formal Orthopedic surgery evaluation and recommendations  - Significant ligamentous injury noted in left knee on MRI from 4/29  Patient will require follow-up with Dr Jalen Salazar within a week to evaluate and to discuss surgical planning   - Maintain NON-weightbearing status on the left lower extremity in knee immobilizer  - Monitor left lower extremity neurovascular exam   - Continue multimodal analgesic regimen   - Continue DVT prophylaxis  - PT and OT evaluation and treatment as indicated  - Outpatient follow up with Orthopedic surgery for re-evaluation
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
## 2022-05-02 NOTE — ASSESSMENT & PLAN NOTE
-hemoglobin drifted to 5 6 on 04/29    He received 2 units of packed red blood cells at that time   -hemoglobin responded appropriately and is stable at 7 9 on 5/2    -anemia is asymptomatic  -no signs of ongoing bleeding clinically  -no need for further transfusion at this time  - no need for further recheck

## 2022-05-03 VITALS
SYSTOLIC BLOOD PRESSURE: 105 MMHG | TEMPERATURE: 98.7 F | DIASTOLIC BLOOD PRESSURE: 63 MMHG | WEIGHT: 154.54 LBS | HEIGHT: 72 IN | BODY MASS INDEX: 20.93 KG/M2 | HEART RATE: 101 BPM | OXYGEN SATURATION: 98 % | RESPIRATION RATE: 18 BRPM

## 2022-05-03 LAB
HCT VFR BLD AUTO: 23.7 % (ref 36.5–49.3)
HGB BLD-MCNC: 8 G/DL (ref 12–17)

## 2022-05-03 PROCEDURE — 99238 HOSP IP/OBS DSCHRG MGMT 30/<: CPT | Performed by: NURSE PRACTITIONER

## 2022-05-03 PROCEDURE — 85018 HEMOGLOBIN: CPT | Performed by: NURSE PRACTITIONER

## 2022-05-03 PROCEDURE — 85014 HEMATOCRIT: CPT | Performed by: NURSE PRACTITIONER

## 2022-05-03 PROCEDURE — NC001 PR NO CHARGE: Performed by: NURSE PRACTITIONER

## 2022-05-03 PROCEDURE — 97116 GAIT TRAINING THERAPY: CPT

## 2022-05-03 RX ORDER — ENOXAPARIN SODIUM 100 MG/ML
30 INJECTION SUBCUTANEOUS EVERY 12 HOURS
Qty: 16.8 ML | Refills: 0 | Status: SHIPPED | OUTPATIENT
Start: 2022-05-03 | End: 2022-07-28 | Stop reason: ALTCHOICE

## 2022-05-03 RX ORDER — METHOCARBAMOL 750 MG/1
750 TABLET, FILM COATED ORAL EVERY 6 HOURS SCHEDULED
Qty: 45 TABLET | Refills: 0 | Status: SHIPPED | OUTPATIENT
Start: 2022-05-03 | End: 2022-07-28 | Stop reason: ALTCHOICE

## 2022-05-03 RX ORDER — ACETAMINOPHEN 325 MG/1
975 TABLET ORAL EVERY 8 HOURS SCHEDULED
Qty: 30 TABLET | Refills: 0 | Status: SHIPPED | OUTPATIENT
Start: 2022-05-03

## 2022-05-03 RX ORDER — GABAPENTIN 100 MG/1
100 CAPSULE ORAL 3 TIMES DAILY
Qty: 60 CAPSULE | Refills: 0 | Status: SHIPPED | OUTPATIENT
Start: 2022-05-03 | End: 2022-05-31 | Stop reason: SDUPTHER

## 2022-05-03 RX ORDER — AMOXICILLIN 250 MG
2 CAPSULE ORAL DAILY
Qty: 15 TABLET | Refills: 0 | Status: SHIPPED | OUTPATIENT
Start: 2022-05-04 | End: 2022-07-28 | Stop reason: ALTCHOICE

## 2022-05-03 RX ORDER — HYDROMORPHONE HYDROCHLORIDE 2 MG/1
2 TABLET ORAL EVERY 4 HOURS PRN
Qty: 30 TABLET | Refills: 0 | Status: SHIPPED | OUTPATIENT
Start: 2022-05-03 | End: 2022-05-13

## 2022-05-03 RX ADMIN — GABAPENTIN 100 MG: 100 CAPSULE ORAL at 17:14

## 2022-05-03 RX ADMIN — ENOXAPARIN SODIUM 30 MG: 30 INJECTION SUBCUTANEOUS at 17:14

## 2022-05-03 RX ADMIN — LIDOCAINE 5% 1 PATCH: 700 PATCH TOPICAL at 08:04

## 2022-05-03 RX ADMIN — METHOCARBAMOL 750 MG: 750 TABLET ORAL at 17:14

## 2022-05-03 RX ADMIN — ENOXAPARIN SODIUM 30 MG: 30 INJECTION SUBCUTANEOUS at 06:09

## 2022-05-03 RX ADMIN — METHOCARBAMOL 750 MG: 750 TABLET ORAL at 12:10

## 2022-05-03 RX ADMIN — SENNOSIDES AND DOCUSATE SODIUM 2 TABLET: 50; 8.6 TABLET ORAL at 08:04

## 2022-05-03 RX ADMIN — METHOCARBAMOL 750 MG: 750 TABLET ORAL at 06:10

## 2022-05-03 RX ADMIN — ACETAMINOPHEN 975 MG: 325 TABLET ORAL at 06:09

## 2022-05-03 RX ADMIN — ACETAMINOPHEN 975 MG: 325 TABLET ORAL at 13:16

## 2022-05-03 RX ADMIN — GABAPENTIN 100 MG: 100 CAPSULE ORAL at 08:04
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
## 2022-05-03 NOTE — PHYSICAL THERAPY NOTE
Physical Therapy Cancellation Note       05/03/22 0905   PT Last Visit   PT Visit Date 05/03/22   Note Type   Note Type Cancelled Session;  PT entered room at 0900 as per planned schedule yesterday however pt states he just started eating and is requesting 15-20 minutes to finish eating  Will follow up as schedule allows and pt appropriate and agreeable       Pia Finnegan, PT
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
## 2022-05-03 NOTE — PLAN OF CARE
Problem: Prexisting or High Potential for Compromised Skin Integrity  Goal: Skin integrity is maintained or improved  Description: INTERVENTIONS:  - Identify patients at risk for skin breakdown  - Assess and monitor skin integrity  - Assess and monitor nutrition and hydration status  - Monitor labs   - Assess for incontinence   - Turn and reposition patient  - Assist with mobility/ambulation  - Relieve pressure over bony prominences  - Avoid friction and shearing  - Provide appropriate hygiene as needed including keeping skin clean and dry  - Evaluate need for skin moisturizer/barrier cream  - Collaborate with interdisciplinary team   - Patient/family teaching  - Consider wound care consult   Outcome: Progressing     Problem: MOBILITY - ADULT  Goal: Maintain or return to baseline ADL function  Description: INTERVENTIONS:  -  Assess patient's ability to carry out ADLs; assess patient's baseline for ADL function and identify physical deficits which impact ability to perform ADLs (bathing, care of mouth/teeth, toileting, grooming, dressing, etc )  - Assess/evaluate cause of self-care deficits   - Assess range of motion  - Assess patient's mobility; develop plan if impaired  - Assess patient's need for assistive devices and provide as appropriate  - Encourage maximum independence but intervene and supervise when necessary  - Involve family in performance of ADLs  - Assess for home care needs following discharge   - Consider OT consult to assist with ADL evaluation and planning for discharge  - Provide patient education as appropriate  Outcome: Progressing     Problem: MOBILITY - ADULT  Goal: Maintains/Returns to pre admission functional level  Description: INTERVENTIONS:  - Perform BMAT or MOVE assessment daily    - Set and communicate daily mobility goal to care team and patient/family/caregiver  - Collaborate with rehabilitation services on mobility goals if consulted  - Perform Range of Motion 6 times a day    - Patient to reposition self every 2 hours  - Patient to dangle self 3 times daily  - Stand patient 3 times a day  - Ambulate patient 3 times a day  - Out of bed to chair 3 times a day   - Out of bed for meals 3 times a day  - Out of bed for toileting  - Record patient progress and toleration of activity level   Outcome: Progressing     Problem: Potential for Falls  Goal: Patient will remain free of falls  Description: INTERVENTIONS:  - Educate patient/family on patient safety including physical limitations  - Instruct patient to call for assistance with activity   - Consult OT/PT to assist with strengthening/mobility   - Keep Call bell within reach  - Keep bed low and locked with side rails adjusted as appropriate  - Keep care items and personal belongings within reach  - Initiate and maintain comfort rounds  - Make Fall Risk Sign visible to staff  - Apply yellow socks and bracelet for high fall risk patients  - Consider moving patient to room near nurses station  Outcome: Progressing     Problem: PAIN - ADULT  Goal: Verbalizes/displays adequate comfort level or baseline comfort level  Description: Interventions:  - Encourage patient to monitor pain and request assistance  - Assess pain using appropriate pain scale  - Administer analgesics based on type and severity of pain and evaluate response  - Implement non-pharmacological measures as appropriate and evaluate response  - Consider cultural and social influences on pain and pain management  - Notify physician/advanced practitioner if interventions unsuccessful or patient reports new pain  Outcome: Progressing     Problem: INFECTION - ADULT  Goal: Absence or prevention of progression during hospitalization  Description: INTERVENTIONS:  - Assess and monitor for signs and symptoms of infection  - Monitor lab/diagnostic results  - Monitor all insertion sites, i e  indwelling lines, tubes, and drains  - Monitor endotracheal if appropriate and nasal secretions for changes in amount and color  - [place] appropriate cooling/warming therapies per order  - Administer medications as ordered  - Instruct and encourage patient and family to use good hand hygiene technique  - Identify and instruct in appropriate isolation precautions for identified infection/condition  Outcome: Progressing
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
## 2022-05-03 NOTE — ASSESSMENT & PLAN NOTE
- Acute right knee and distal thigh pain and swelling developed spontaneously on 04/25/2022  Suspect likely soft tissue contusion and hematoma, but cannot rule out septic joint or additional traumatic injury   - Right knee x-rays from 04/25/2022 reviewed without evidence of underlying fracture, but there was suprapatellar soft tissue swelling noted  - CT right lower extremity from 04/26/2022 shows right knee hematoma without active extravasation   - MRI right knee from 4/28/22 shows no acute internal derangement with bony contusion of the proximal tibia and fibula and anteromedial right knee hematoma  - Appreciate Orthopedic surgery evaluation and recommendations  - Continue to monitor neurovascular exam; no evidence of compartment syndrome at this time  - Continue multimodal analgesic regimen    RLE remains edematous

## 2022-05-03 NOTE — ASSESSMENT & PLAN NOTE
-hemoglobin drifted to 5 6 on 04/29    He received 2 units of packed red blood cells at that time   -hemoglobin responded appropriately and is stable at 7 9 on 5/2    -anemia is asymptomatic  -no signs of ongoing bleeding clinically  -no need for further transfusion at this time
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
## 2022-05-03 NOTE — ASSESSMENT & PLAN NOTE
- Acute mildly displaced fracture of the left orbital floor, present on presentation   - Appreciate OMS evaluation and recommendations  Non operative management recommended  - Appreciate Ophthalmology evaluation and recommendations  No intervention or further workup at this time  Recommend outpatient follow-up  - Maintain sinus precautions  - Initiate multimodal analgesic regimen   - Outpatient follow-up with OMS and Ophthalmology    EOm's intact, small left subconjunctival hemorrhage

## 2022-05-03 NOTE — PLAN OF CARE
Problem: Prexisting or High Potential for Compromised Skin Integrity  Goal: Skin integrity is maintained or improved  Description: INTERVENTIONS:  - Identify patients at risk for skin breakdown  - Assess and monitor skin integrity  - Assess and monitor nutrition and hydration status  - Monitor labs   - Assess for incontinence   - Turn and reposition patient  - Assist with mobility/ambulation  - Relieve pressure over bony prominences  - Avoid friction and shearing  - Provide appropriate hygiene as needed including keeping skin clean and dry  - Evaluate need for skin moisturizer/barrier cream  - Collaborate with interdisciplinary team   - Patient/family teaching  - Consider wound care consult   Outcome: Progressing     Problem: MOBILITY - ADULT  Goal: Maintain or return to baseline ADL function  Description: INTERVENTIONS:  -  Assess patient's ability to carry out ADLs; assess patient's baseline for ADL function and identify physical deficits which impact ability to perform ADLs (bathing, care of mouth/teeth, toileting, grooming, dressing, etc )  - Assess/evaluate cause of self-care deficits   - Assess range of motion  - Assess patient's mobility; develop plan if impaired  - Assess patient's need for assistive devices and provide as appropriate  - Encourage maximum independence but intervene and supervise when necessary  - Involve family in performance of ADLs  - Assess for home care needs following discharge   - Consider OT consult to assist with ADL evaluation and planning for discharge  - Provide patient education as appropriate  Outcome: Progressing  Goal: Maintains/Returns to pre admission functional level  Description: INTERVENTIONS:  - Perform BMAT or MOVE assessment daily    - Set and communicate daily mobility goal to care team and patient/family/caregiver  - Collaborate with rehabilitation services on mobility goals if consulted  - Perform Range of Motion 3 times a day    - Reposition patient every 2 hours   - Dangle patient 3 times a day  - Stand patient 3 times a day  - Ambulate patient 3 times a day  - Out of bed to chair 3 times a day   - Out of bed for meals 3 times a day  - Out of bed for toileting  - Record patient progress and toleration of activity level   Outcome: Progressing     Problem: Potential for Falls  Goal: Patient will remain free of falls  Description: INTERVENTIONS:  - Educate patient/family on patient safety including physical limitations  - Instruct patient to call for assistance with activity   - Consult OT/PT to assist with strengthening/mobility   - Keep Call bell within reach  - Keep bed low and locked with side rails adjusted as appropriate  - Keep care items and personal belongings within reach  - Initiate and maintain comfort rounds  - Make Fall Risk Sign visible to staff  - Offer Toileting every 3 Hours, in advance of need  - Initiate/Maintain bed alarm  - Obtain necessary fall risk management equipment:    - Apply yellow socks and bracelet for high fall risk patients  - Consider moving patient to room near nurses station  Outcome: Progressing     Problem: Nutrition/Hydration-ADULT  Goal: Nutrient/Hydration intake appropriate for improving, restoring or maintaining nutritional needs  Description: Monitor and assess patient's nutrition/hydration status for malnutrition  Collaborate with interdisciplinary team and initiate plan and interventions as ordered  Monitor patient's weight and dietary intake as ordered or per policy  Utilize nutrition screening tool and intervene as necessary  Determine patient's food preferences and provide high-protein, high-caloric foods as appropriate       INTERVENTIONS:  - Monitor oral intake, urinary output, labs, and treatment plans  - Assess nutrition and hydration status and recommend course of action  - Evaluate amount of meals eaten  - Assist patient with eating if necessary   - Allow adequate time for meals  - Recommend/ encourage appropriate diets, oral nutritional supplements, and vitamin/mineral supplements  - Order, calculate, and assess calorie counts as needed  - Recommend, monitor, and adjust tube feedings and TPN/PPN based on assessed needs  - Assess need for intravenous fluids  - Provide specific nutrition/hydration education as appropriate  - Include patient/family/caregiver in decisions related to nutrition  Outcome: Progressing     Problem: PAIN - ADULT  Goal: Verbalizes/displays adequate comfort level or baseline comfort level  Description: Interventions:  - Encourage patient to monitor pain and request assistance  - Assess pain using appropriate pain scale  - Administer analgesics based on type and severity of pain and evaluate response  - Implement non-pharmacological measures as appropriate and evaluate response  - Consider cultural and social influences on pain and pain management  - Notify physician/advanced practitioner if interventions unsuccessful or patient reports new pain  Outcome: Progressing     Problem: INFECTION - ADULT  Goal: Absence or prevention of progression during hospitalization  Description: INTERVENTIONS:  - Assess and monitor for signs and symptoms of infection  - Monitor lab/diagnostic results  - Monitor all insertion sites, i e  indwelling lines, tubes, and drains  - Monitor endotracheal if appropriate and nasal secretions for changes in amount and color  - Murray appropriate cooling/warming therapies per order  - Administer medications as ordered  - Instruct and encourage patient and family to use good hand hygiene technique  - Identify and instruct in appropriate isolation precautions for identified infection/condition  Outcome: Progressing     Problem: SAFETY ADULT  Goal: Maintain or return to baseline ADL function  Description: INTERVENTIONS:  -  Assess patient's ability to carry out ADLs; assess patient's baseline for ADL function and identify physical deficits which impact ability to perform ADLs (bathing, care of mouth/teeth, toileting, grooming, dressing, etc )  - Assess/evaluate cause of self-care deficits   - Assess range of motion  - Assess patient's mobility; develop plan if impaired  - Assess patient's need for assistive devices and provide as appropriate  - Encourage maximum independence but intervene and supervise when necessary  - Involve family in performance of ADLs  - Assess for home care needs following discharge   - Consider OT consult to assist with ADL evaluation and planning for discharge  - Provide patient education as appropriate  Outcome: Progressing
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
## 2022-05-03 NOTE — ASSESSMENT & PLAN NOTE
- Acute nondisplaced left tibial plateau fracture, present on admission  Based on left knee instability and physical exam, patient likely to have ligamentous injuries  - Appreciate formal Orthopedic surgery evaluation and recommendations  - Significant ligamentous injury noted in left knee on MRI from 4/29  Patient will require follow-up with Dr Jalen Salazar within a week to evaluate and to discuss surgical planning   - Maintain NON-weightbearing status on the left lower extremity in knee immobilizer  - Monitor left lower extremity neurovascular exam   - Continue multimodal analgesic regimen   - Continue DVT prophylaxis  - PT and OT evaluation and treatment as indicated  - Outpatient follow up with Orthopedic surgery for re-evaluation    Neuro intact
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
## 2022-05-03 NOTE — PLAN OF CARE
Problem: PHYSICAL THERAPY ADULT  Goal: Performs mobility at highest level of function for planned discharge setting  See evaluation for individualized goals  Description: Treatment/Interventions: Functional transfer training,LE strengthening/ROM,Elevations,Therapeutic exercise,Endurance training,Patient/family training,Equipment eval/education,Bed mobility,Gait training,Spoke to nursing  Equipment Recommended: Beti Celis       See flowsheet documentation for full assessment, interventions and recommendations  Note: Prognosis: Good  Problem List: Decreased strength,Decreased range of motion,Decreased endurance,Impaired balance,Decreased mobility,Decreased skin integrity,Pain,Orthopedic restrictions  Assessment: Pt is pleasant and agreeable to participate in PT treatment session  Pt positioned supine at beginning of session  Treatment consisted of bed mobility, transfer training, balance training, ambulation, stair negotiation, fall prevention, safety awareness, endurance training, and AD training  Pt demonstrated significant improve mobility w/ axillary crutches throughout transfers, ambulation, and stair trial  Pt was able to perform bed mobility w/ supervision, transfers w/ min A x 1, ambulation w/ supervision, and stair negotiation w/ min A x 1 and LLE management  Pt was able to ambulate 100 ft w/ axillary crutches w/ supervision  Pt was able to demo bumping on buttock as he navigated 5 steps  Pt was educated on having someone A w/ LLE for stairs and someone to guard in front of him while he is bumping  Pt was able to verbalize understanding  Pt continues to state that he has multiple people at home to A w/ him getting in/out of home  Pt continues to benefit from skilled IP PT while in acute care setting to address the above deficits  D/C recommendations continue to be home w/ [facility] PT in order to improve pt functional mobility and independence   Pt remains supine in bed, at end of session, w/ call bell within reach, and all needs met  Spoke to nurse and CM  See advised pt goals to reflect progress  PT Discharge Recommendation: Home with home health rehabilitation          See flowsheet documentation for full assessment

## 2022-05-03 NOTE — DISCHARGE INSTRUCTIONS
Discharge Instructions - Orthopedics  Renetta Castañeda 39 y o  male MRN: [id number]  Unit/Bed#: AN XRAY    Weight Bearing Status:                                           Nonweightbearing left lower extremity keep the knee in the immobilizer keep the boot on as well    DVT prophylaxis  Complete DVT prophylaxis as prescribed    Pain:  Continue analgesics as directed    Dressing Instructions:   Please keep clean, dry and intact until follow up     Appt Instructions: If you do not have your appointment, please call the clinic at [phone] t  Otherwise followup as scheduled     Contact the office sooner if you experience any increased numbness/tingling in the extremities  Please call Ortho office tomorrow to be seen for surgery on your foot on Thursday    [phone]

## 2022-05-04 NOTE — UTILIZATION REVIEW
Notification of Discharge   This is a Notification of Discharge from our facility [address]  Please be advised that this patient has been discharge from our facility  Below you will find the admission and discharge date and time including the patients disposition  UTILIZATION REVIEW CONTACT:  Melani Erickson MA  Utilization   Network Utilization Review Department  Phone: [phone] x carefully listen to the prompts  All voicemails are confidential   Email: [email or web address]     PHYSICIAN ADVISORY SERVICES:  FOR AGEI-NE-AOOE REVIEW - MEDICAL NECESSITY DENIAL  Phone: [phone]  Fax: [phone]  Email: [email or web address]     PRESENTATION DATE: 4/24/2022  1:18 PM  OBERVATION ADMISSION DATE:   INPATIENT ADMISSION DATE: 4/24/22  2:12 PM   DISCHARGE DATE: 5/3/2022  6:05 PM  DISPOSITION: Home/Self Care Home/Self Care      IMPORTANT INFORMATION:  Send all requests for admission clinical reviews, approved or denied determinations and any other requests to dedicated fax number below belonging to the campus where the patient is receiving treatment   List of dedicated fax numbers:  [address] DENIALS (Administrative/Medical Necessity) [phone]   [address] (Maternity/NICU/Pediatrics) [phone]   Logan Paz [phone]   130 [facility] [phone]   [address] [place] [address] [phone]   [address] [phone]   [facility]  [phone] 0935 [facility], [address]  2401 [facility] 1000 [address] [facility] [phone]

## 2022-05-05 ENCOUNTER — ANESTHESIA EVENT (OUTPATIENT)
Dept: PERIOP | Facility: HOSPITAL | Age: 37
End: 2022-05-05
Payer: COMMERCIAL

## 2022-05-05 ENCOUNTER — TRANSITIONAL CARE MANAGEMENT (OUTPATIENT)
Dept: FAMILY MEDICINE CLINIC | Facility: CLINIC | Age: 37
End: 2022-05-05

## 2022-05-06 ENCOUNTER — TELEPHONE (OUTPATIENT)
Dept: OBGYN CLINIC | Facility: HOSPITAL | Age: 37
End: 2022-05-06

## 2022-05-06 NOTE — PRE-PROCEDURE INSTRUCTIONS
Pre-Surgery Instructions:   Medication Instructions    acetaminophen (TYLENOL) 325 mg tablet PRN    enoxaparin (enoxaparin) 30 mg/0 3 mL Pt taking    gabapentin (NEURONTIN) 100 mg capsule Take as directed    HYDROmorphone (DILAUDID) 2 mg tablet PRN    methocarbamol (ROBAXIN) 750 mg tablet Take as directed      My Surgical Experience    The following information was developed to assist you to prepare for your operation  What do I need to do before coming to the hospital?   Arrange for a responsible person to drive you to and from the hospital    Arrange care for your children at home  Children are not allowed in the recovery areas of the hospital   Plan to wear clothing that is easy to put on and take off  If you are having shoulder surgery, wear a shirt that buttons or zippers in the front  Bathing  o Shower the evening before and the morning of your surgery with an antibacterial soap  Please refer to the Pre Op Showering Instructions for Surgery Patients Sheet   o Remove nail polish and all body piercing jewelry  o Do not shave any body part for at least 24 hours before surgery-this includes face, arms, legs and upper body  Food  o Nothing to eat or drink after midnight the night before your surgery  This includes candy and chewing gum  o Exception: If your surgery is after 12:00pm (noon), you may have clear liquids such as 7-Up®, ginger ale, apple or cranberry juice, Jell-O®, water, or clear broth until 8:00 am  o Do not drink milk or juice with pulp on the morning before surgery  o Do not drink alcohol 24 hours before surgery  Medicine  o Follow instructions you received from your surgeon about which medicines you may take on the day of surgery  o If instructed to take medicine on the morning of surgery, take pills with just a small sip of water   Call your prescribing doctor for specific infroamtion on what to do if you take insulin    What should I bring to the hospital?    Bring:  Rola Ortiz or jose walker, if you have them, for foot or knee surgery   A list of the daily medicines, vitamins, minerals, herbals and nutritional supplements you take  Include the dosages of medicines and the time you take them each day   Glasses, dentures or hearing aids   Minimal clothing; you will be wearing hospital sleepwear   Photo ID; required to verify your identity   If you have a Living Will or Power of , bring a copy of the documents   If you have an ostomy, bring an extra pouch and any supplies you use    Do not bring   Medicines or inhalers   Money, valuables or jewelry    What other information should I know about the day of surgery?  Notify your surgeons if you develop a cold, sore throat, cough, fever, rash or any other illness   Report to the Ambulatory Surgical/Same Day Surgery Unit   You will be instructed to stop at Registration only if you have not been pre-registered   Inform your  fi they do not stay that they will be asked by the staff to leave a phone number where they can be reached   Be available to be reached before surgery  In the event the operating room schedule changes, you may be asked to come in earlier or later than expected    *It is important to tell your doctor and others involved in your health care if you are taking or have been taking any non-prescription drugs, vitamins, minerals, herbals or other nutritional supplements   Any of these may interact with some food or medicines and cause a reaction

## 2022-05-06 NOTE — TELEPHONE ENCOUNTER
I called patient and confirmed that surgery is all set for 5/13, he said that he was speaking with someone and the line disconnected, he is unaware who he was speaking with  Do you know anything about this?

## 2022-05-11 ENCOUNTER — TELEPHONE (OUTPATIENT)
Dept: OBGYN CLINIC | Facility: CLINIC | Age: 37
End: 2022-05-11

## 2022-05-11 LAB
DME PARACHUTE DELIVERY DATE ACTUAL: NORMAL
DME PARACHUTE DELIVERY DATE REQUESTED: NORMAL
DME PARACHUTE ITEM DESCRIPTION: NORMAL
DME PARACHUTE ORDER STATUS: NORMAL
DME PARACHUTE SUPPLIER NAME: NORMAL
DME PARACHUTE SUPPLIER PHONE: NORMAL

## 2022-05-11 NOTE — TELEPHONE ENCOUNTER
I called the patient with an  and discussed with the patient that he was to follow up with us after his discharge from the hospital  We scheduled an appt for 5/17 at 9:45 at [facility] to see him and discuss his foot injury  He is scheduled for surgery on 5/13 with Dr Laura Delacruz

## 2022-05-12 NOTE — PRE-PROCEDURE INSTRUCTIONS
Dr Torri Kyle currently in [address] [place] [address] until approx 8pm - contacted Anesthesia and OR charge regarding lovenox  Waiting to hear back on how to instruct the patient    Update at 1757: Per Dr Torir Kyle - pt should take his lovenox tonight but HOLD his 6am dose tomorrow  Will call pt and instruct  [Mother] : mother

## 2022-05-12 NOTE — PRE-PROCEDURE INSTRUCTIONS
Contacted Dr Asad Mauricio via tiger-text regarding pt's lovenox injections - to confirm if he should take his dose tonight  Pt stated during pre-op call with Dianelys Kelsey (fluent in [address] [facility]) that he took his last injection this morning at 6am  He has been on lovenox 2x/day since 5/3/22

## 2022-05-13 ENCOUNTER — ANESTHESIA (OUTPATIENT)
Dept: PERIOP | Facility: HOSPITAL | Age: 37
End: 2022-05-13
Payer: COMMERCIAL

## 2022-05-13 ENCOUNTER — HOSPITAL ENCOUNTER (OUTPATIENT)
Facility: HOSPITAL | Age: 37
Setting detail: OUTPATIENT SURGERY
Discharge: HOME | End: 2022-05-13
Attending: ORTHOPAEDIC SURGERY | Admitting: ORTHOPAEDIC SURGERY
Payer: COMMERCIAL

## 2022-05-13 VITALS
HEART RATE: 84 BPM | OXYGEN SATURATION: 99 % | RESPIRATION RATE: 16 BRPM | SYSTOLIC BLOOD PRESSURE: 117 MMHG | BODY MASS INDEX: 21.02 KG/M2 | DIASTOLIC BLOOD PRESSURE: 65 MMHG | WEIGHT: 155.2 LBS | TEMPERATURE: 97.3 F | HEIGHT: 72 IN

## 2022-05-13 DIAGNOSIS — S89.92XA LEFT KNEE INJURY, INITIAL ENCOUNTER: Primary | ICD-10-CM

## 2022-05-13 PROBLEM — F17.200 SMOKING: Status: ACTIVE | Noted: 2022-05-13

## 2022-05-13 PROBLEM — IMO0001 SMOKING: Status: ACTIVE | Noted: 2022-05-13

## 2022-05-13 LAB
ABO GROUP BLD: NORMAL
BLD GP AB SCN SERPL QL: NEGATIVE
ERYTHROCYTE [DISTWIDTH] IN BLOOD BY AUTOMATED COUNT: 15.5 % (ref 11.6–15.1)
HCT VFR BLD AUTO: 33.7 % (ref 36.5–49.3)
HGB BLD-MCNC: 10.7 G/DL (ref 12–17)
MCH RBC QN AUTO: 32.6 PG (ref 26.8–34.3)
MCHC RBC AUTO-ENTMCNC: 31.8 G/DL (ref 31.4–37.4)
MCV RBC AUTO: 103 FL (ref 82–98)
PLATELET # BLD AUTO: 562 THOUSANDS/UL (ref 149–390)
PMV BLD AUTO: 8.8 FL (ref 8.9–12.7)
RBC # BLD AUTO: 3.28 MILLION/UL (ref 3.88–5.62)
RH BLD: POSITIVE
SPECIMEN EXPIRATION DATE: NORMAL
WBC # BLD AUTO: 8.56 THOUSAND/UL (ref 4.31–10.16)

## 2022-05-13 PROCEDURE — C1713 ANCHOR/SCREW BN/BN,TIS/BN: HCPCS | Performed by: ORTHOPAEDIC SURGERY

## 2022-05-13 PROCEDURE — 86901 BLOOD TYPING SEROLOGIC RH(D): CPT | Performed by: ORTHOPAEDIC SURGERY

## 2022-05-13 PROCEDURE — 29888 ARTHRS AID ACL RPR/AGMNTJ: CPT | Performed by: ORTHOPAEDIC SURGERY

## 2022-05-13 PROCEDURE — 85027 COMPLETE CBC AUTOMATED: CPT | Performed by: ANESTHESIOLOGY

## 2022-05-13 PROCEDURE — 27427 RECONSTRUCTION KNEE: CPT | Performed by: ORTHOPAEDIC SURGERY

## 2022-05-13 PROCEDURE — 29889 ARTHRS AID PCL RPR/AGMNTJ: CPT | Performed by: ORTHOPAEDIC SURGERY

## 2022-05-13 PROCEDURE — 86850 RBC ANTIBODY SCREEN: CPT | Performed by: ORTHOPAEDIC SURGERY

## 2022-05-13 PROCEDURE — 86900 BLOOD TYPING SEROLOGIC ABO: CPT | Performed by: ORTHOPAEDIC SURGERY

## 2022-05-13 DEVICE — GRAFT DBL BUNDLE NON-BN TENDON GRAFTLINK 60-80MM: Type: IMPLANTABLE DEVICE | Site: KNEE | Status: FUNCTIONAL

## 2022-05-13 DEVICE — IMPLANT FIXATION OPEN TIGHTROPE ABS: Type: IMPLANTABLE DEVICE | Site: KNEE | Status: FUNCTIONAL

## 2022-05-13 DEVICE — IMPLANTABLE DEVICE
Type: IMPLANTABLE DEVICE | Site: KNEE | Status: FUNCTIONAL
Brand: JRF GRAFTLINK XL

## 2022-05-13 DEVICE — Ø8X 20MM BC IF SCRW, VENTED
Type: IMPLANTABLE DEVICE | Site: KNEE | Status: FUNCTIONAL
Brand: ARTHREX®

## 2022-05-13 DEVICE — IMPLSYS 2NDRY FIXATN BIOSWVLK 4.75X19.1
Type: IMPLANTABLE DEVICE | Site: KNEE | Status: FUNCTIONAL
Brand: ARTHREX®

## 2022-05-13 DEVICE — BIO-COMP SWVLK C, CLD 4.75X19.1MM
Type: IMPLANTABLE DEVICE | Site: KNEE | Status: FUNCTIONAL
Brand: ARTHREX®

## 2022-05-13 DEVICE — TIGHTROPE ABS BUTTON ROUND 11MM CONCAVE
Type: IMPLANTABLE DEVICE | Site: KNEE | Status: FUNCTIONAL
Brand: ARTHREX®

## 2022-05-13 DEVICE — IMPLANTABLE DEVICE: Type: IMPLANTABLE DEVICE | Site: KNEE | Status: FUNCTIONAL

## 2022-05-13 DEVICE — SYSTEM GRAFT FIXATION BTB TRIGHTROPE RT: Type: IMPLANTABLE DEVICE | Site: KNEE | Status: FUNCTIONAL

## 2022-05-13 DEVICE — Ø7X 20MM BC IF SCRW, VENTED
Type: IMPLANTABLE DEVICE | Site: KNEE | Status: FUNCTIONAL
Brand: ARTHREX®

## 2022-05-13 RX ORDER — ONDANSETRON 2 MG/ML
4 INJECTION INTRAMUSCULAR; INTRAVENOUS ONCE AS NEEDED
Status: COMPLETED | OUTPATIENT
Start: 2022-05-13 | End: 2022-05-13

## 2022-05-13 RX ORDER — LIDOCAINE HYDROCHLORIDE 20 MG/ML
INJECTION, SOLUTION EPIDURAL; INFILTRATION; INTRACAUDAL; PERINEURAL AS NEEDED
Status: DISCONTINUED | OUTPATIENT
Start: 2022-05-13 | End: 2022-05-13

## 2022-05-13 RX ORDER — MAGNESIUM HYDROXIDE 1200 MG/15ML
LIQUID ORAL AS NEEDED
Status: DISCONTINUED | OUTPATIENT
Start: 2022-05-13 | End: 2022-05-13 | Stop reason: HOSPADM

## 2022-05-13 RX ORDER — CEFAZOLIN SODIUM 2 G/50ML
2000 SOLUTION INTRAVENOUS
Status: DISCONTINUED | OUTPATIENT
Start: 2022-05-14 | End: 2022-05-13 | Stop reason: HOSPADM

## 2022-05-13 RX ORDER — FENTANYL CITRATE/PF 50 MCG/ML
25 SYRINGE (ML) INJECTION
Status: DISCONTINUED | OUTPATIENT
Start: 2022-05-13 | End: 2022-05-13 | Stop reason: HOSPADM

## 2022-05-13 RX ORDER — DEXAMETHASONE SODIUM PHOSPHATE 10 MG/ML
INJECTION, SOLUTION INTRAMUSCULAR; INTRAVENOUS AS NEEDED
Status: DISCONTINUED | OUTPATIENT
Start: 2022-05-13 | End: 2022-05-13

## 2022-05-13 RX ORDER — OXYCODONE HYDROCHLORIDE 5 MG/1
5 TABLET ORAL EVERY 4 HOURS PRN
Status: DISCONTINUED | OUTPATIENT
Start: 2022-05-13 | End: 2022-05-13 | Stop reason: HOSPADM

## 2022-05-13 RX ORDER — MIDAZOLAM HYDROCHLORIDE 2 MG/2ML
INJECTION, SOLUTION INTRAMUSCULAR; INTRAVENOUS AS NEEDED
Status: DISCONTINUED | OUTPATIENT
Start: 2022-05-13 | End: 2022-05-13

## 2022-05-13 RX ORDER — SODIUM CHLORIDE 9 MG/ML
125 INJECTION, SOLUTION INTRAVENOUS CONTINUOUS
Status: DISCONTINUED | OUTPATIENT
Start: 2022-05-13 | End: 2022-05-13 | Stop reason: HOSPADM

## 2022-05-13 RX ORDER — ROPIVACAINE HYDROCHLORIDE 5 MG/ML
INJECTION, SOLUTION EPIDURAL; INFILTRATION; PERINEURAL
Status: COMPLETED | OUTPATIENT
Start: 2022-05-13 | End: 2022-05-13

## 2022-05-13 RX ORDER — PROPOFOL 10 MG/ML
INJECTION, EMULSION INTRAVENOUS AS NEEDED
Status: DISCONTINUED | OUTPATIENT
Start: 2022-05-13 | End: 2022-05-13

## 2022-05-13 RX ORDER — OXYCODONE HYDROCHLORIDE 5 MG/1
10 TABLET ORAL EVERY 4 HOURS PRN
Status: DISCONTINUED | OUTPATIENT
Start: 2022-05-13 | End: 2022-05-13 | Stop reason: HOSPADM

## 2022-05-13 RX ORDER — FENTANYL CITRATE 50 UG/ML
INJECTION, SOLUTION INTRAMUSCULAR; INTRAVENOUS AS NEEDED
Status: DISCONTINUED | OUTPATIENT
Start: 2022-05-13 | End: 2022-05-13

## 2022-05-13 RX ORDER — OXYCODONE HYDROCHLORIDE 5 MG/1
5 TABLET ORAL EVERY 4 HOURS PRN
Qty: 40 TABLET | Refills: 0 | Status: SHIPPED | OUTPATIENT
Start: 2022-05-13 | End: 2022-05-16 | Stop reason: SDUPTHER

## 2022-05-13 RX ORDER — ONDANSETRON 2 MG/ML
INJECTION INTRAMUSCULAR; INTRAVENOUS AS NEEDED
Status: DISCONTINUED | OUTPATIENT
Start: 2022-05-13 | End: 2022-05-13

## 2022-05-13 RX ORDER — HYDROMORPHONE HCL/PF 1 MG/ML
SYRINGE (ML) INJECTION AS NEEDED
Status: DISCONTINUED | OUTPATIENT
Start: 2022-05-13 | End: 2022-05-13

## 2022-05-13 RX ORDER — ONDANSETRON 4 MG/1
4 TABLET, ORALLY DISINTEGRATING ORAL EVERY 8 HOURS PRN
Qty: 15 TABLET | Refills: 0 | Status: SHIPPED | OUTPATIENT
Start: 2022-05-13 | End: 2022-07-28 | Stop reason: ALTCHOICE

## 2022-05-13 RX ORDER — CEFAZOLIN SODIUM 1 G/3ML
INJECTION, POWDER, FOR SOLUTION INTRAMUSCULAR; INTRAVENOUS AS NEEDED
Status: DISCONTINUED | OUTPATIENT
Start: 2022-05-13 | End: 2022-05-13

## 2022-05-13 RX ADMIN — FENTANYL CITRATE 25 MCG: 50 INJECTION, SOLUTION INTRAMUSCULAR; INTRAVENOUS at 19:24

## 2022-05-13 RX ADMIN — CEFAZOLIN SODIUM 2000 MG: 1 INJECTION, POWDER, FOR SOLUTION INTRAMUSCULAR; INTRAVENOUS at 15:20

## 2022-05-13 RX ADMIN — SODIUM CHLORIDE 125 ML/HR: 0.9 INJECTION, SOLUTION INTRAVENOUS at 12:06

## 2022-05-13 RX ADMIN — ONDANSETRON 4 MG: 2 INJECTION INTRAMUSCULAR; INTRAVENOUS at 18:40

## 2022-05-13 RX ADMIN — PROPOFOL 100 MG: 10 INJECTION, EMULSION INTRAVENOUS at 15:24

## 2022-05-13 RX ADMIN — SODIUM CHLORIDE: 0.9 INJECTION, SOLUTION INTRAVENOUS at 15:22

## 2022-05-13 RX ADMIN — OXYCODONE HYDROCHLORIDE 10 MG: 5 TABLET ORAL at 20:06

## 2022-05-13 RX ADMIN — HYDROMORPHONE HYDROCHLORIDE 0.5 MG: 1 INJECTION, SOLUTION INTRAMUSCULAR; INTRAVENOUS; SUBCUTANEOUS at 16:46

## 2022-05-13 RX ADMIN — PROPOFOL 100 MG: 10 INJECTION, EMULSION INTRAVENOUS at 17:10

## 2022-05-13 RX ADMIN — MIDAZOLAM 4 MG: 1 INJECTION INTRAMUSCULAR; INTRAVENOUS at 13:51

## 2022-05-13 RX ADMIN — FENTANYL CITRATE 50 MCG: 50 INJECTION INTRAMUSCULAR; INTRAVENOUS at 15:25

## 2022-05-13 RX ADMIN — FENTANYL CITRATE 100 MCG: 50 INJECTION INTRAMUSCULAR; INTRAVENOUS at 17:29

## 2022-05-13 RX ADMIN — FENTANYL CITRATE 50 MCG: 50 INJECTION INTRAMUSCULAR; INTRAVENOUS at 15:23

## 2022-05-13 RX ADMIN — LIDOCAINE HYDROCHLORIDE 60 MG: 20 INJECTION, SOLUTION EPIDURAL; INFILTRATION; INTRACAUDAL; PERINEURAL at 15:09

## 2022-05-13 RX ADMIN — FENTANYL CITRATE 25 MCG: 50 INJECTION, SOLUTION INTRAMUSCULAR; INTRAVENOUS at 19:36

## 2022-05-13 RX ADMIN — FENTANYL CITRATE 25 MCG: 50 INJECTION, SOLUTION INTRAMUSCULAR; INTRAVENOUS at 19:29

## 2022-05-13 RX ADMIN — DEXAMETHASONE SODIUM PHOSPHATE 4 MG: 10 INJECTION INTRAMUSCULAR; INTRAVENOUS at 15:14

## 2022-05-13 RX ADMIN — PROPOFOL 200 MG: 10 INJECTION, EMULSION INTRAVENOUS at 15:09

## 2022-05-13 RX ADMIN — SODIUM CHLORIDE: 0.9 INJECTION, SOLUTION INTRAVENOUS at 16:32

## 2022-05-13 RX ADMIN — PHENYLEPHRINE HYDROCHLORIDE 40 MCG/MIN: 10 INJECTION INTRAVENOUS at 16:34

## 2022-05-13 RX ADMIN — HYDROMORPHONE HYDROCHLORIDE 0.5 MG: 1 INJECTION, SOLUTION INTRAMUSCULAR; INTRAVENOUS; SUBCUTANEOUS at 16:37

## 2022-05-13 RX ADMIN — ROPIVACAINE HYDROCHLORIDE 20 ML: 5 INJECTION, SOLUTION EPIDURAL; INFILTRATION; PERINEURAL at 13:55

## 2022-05-13 RX ADMIN — FENTANYL CITRATE 100 MCG: 50 INJECTION INTRAMUSCULAR; INTRAVENOUS at 13:51

## 2022-05-13 RX ADMIN — SODIUM CHLORIDE 125 ML/HR: 0.9 INJECTION, SOLUTION INTRAVENOUS at 09:14

## 2022-05-13 RX ADMIN — ONDANSETRON 4 MG: 2 INJECTION INTRAMUSCULAR; INTRAVENOUS at 19:31

## 2022-05-13 RX ADMIN — ROPIVACAINE HYDROCHLORIDE 20 ML: 5 INJECTION, SOLUTION EPIDURAL; INFILTRATION; PERINEURAL at 13:58

## 2022-05-13 NOTE — INTERVAL H&P NOTE
H&P reviewed  After examining the patient I find no changes in the patients condition since the H&P had been written      Vitals:    05/13/22 0829   BP: 113/63   Pulse: 81   Resp: 14   Temp: 98 °F (36 7 °C)   SpO2: 97%

## 2022-05-13 NOTE — ANESTHESIA PROCEDURE NOTES
Peripheral Block    Patient location during procedure: holding area  Start time: 5/13/2022 1:51 PM  Reason for block: at surgeon's request and post-op pain management  Staffing  Performed: Anesthesiologist   Anesthesiologist: Ami Henriquez DO  Preanesthetic Checklist  Completed: patient identified, IV checked, site marked, risks and benefits discussed, surgical consent, monitors and equipment checked, pre-op evaluation and timeout performed  Peripheral Block  Patient position: supine  Prep: ChloraPrep  Patient monitoring: continuous pulse ox and frequent blood pressure checks  Block type: femoral  Laterality: left  Injection technique: single-shot  Procedures: ultrasound guided, Ultrasound guidance required for the procedure to increase accuracy and safety of medication placement and decrease risk of complications   and nerve stimulator  Ultrasound permanent image savedropivacaine (NAROPIN) 0 5 % - Perineural   20 mL - 5/13/2022 1:55:00 PM  Needle  Needle type: Stimuplex   Needle gauge: 22 G  Needle length: 10 cm  Needle localization: anatomical landmarks, nerve stimulator and ultrasound guidance  Test dose: negative  Assessment  Injection assessment: incremental injection, local visualized surrounding nerve on ultrasound and negative aspiration for heme  Paresthesia pain: none  Heart rate change: no  Slow fractionated injection: yes  Post-procedure:  site cleaned  patient tolerated the procedure well with no immediate complications
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
## 2022-05-13 NOTE — ANESTHESIA PROCEDURE NOTES
Peripheral Block    Patient location during procedure: holding area  Start time: 5/13/2022 1:56 PM  Reason for block: at surgeon's request and post-op pain management  Staffing  Performed: Anesthesiologist   Anesthesiologist: Ami Henriquez DO  Preanesthetic Checklist  Completed: patient identified, IV checked, site marked, risks and benefits discussed, surgical consent, monitors and equipment checked, pre-op evaluation and timeout performed  Peripheral Block  Patient position: supine  Prep: ChloraPrep  Patient monitoring: continuous pulse ox and frequent blood pressure checks  Block type: sciatic  Laterality: left  Injection technique: single-shot  Procedures: nerve stimulatorropivacaine (NAROPIN) 0 5 % - Perineural   20 mL - 5/13/2022 1:58:00 PM  Needle  Needle type: Stimuplex   Needle gauge: 22 G  Needle length: 10 cm  Needle localization: anatomical landmarks and nerve stimulator  Test dose: negative  Assessment  Injection assessment: incremental injection, local visualized surrounding nerve on ultrasound and negative aspiration for heme  Paresthesia pain: none  Heart rate change: no  Slow fractionated injection: yes  Post-procedure:  site cleaned  patient tolerated the procedure well with no immediate complications

## 2022-05-13 NOTE — DISCHARGE INSTRUCTIONS
POSTOPERATIVE INSTRUCTIONS following KNEE SURGERY    MEDICATIONS:  Resume all home medications unless otherwise instructed by your surgeon  Pain Medication:  Oxycodone 5 mg, 1-3 tablets every 3 hours as needed  If you were given a regional anesthetic (nerve block), please begin taking the pain medication as soon as you get home, even if you have minimal or no pain  DO NOT WAIT FOR THE NERVE BLOCK TO WEAR OFF  Possible side effects include nausea, constipation, and urinary retention  If you experience these side effects, please call our office for assistance  Pain med refills are authorized only during office hours (8am-4pm, Mon-Fri)  Anti-Inflammatory:  Resume your home anti-inflammatory medication  Take with food  Stop if you experience nausea, reflux, or stomach pain  Nausea Medication:  Zofran ODT 4 mg, 1 tablet every 6 hours as needed  Fill prescription ONLY if you expericnce severe nausea  Blood Clot Prevention:  Continue Lovenox 30 mg twice a day as your were doing before surgery  Pump your foot up and down 20 times per hour while you are less mobile  WOUND CARE:  Keep the dressing clean and dry  Light drainage may occur the first 2 days postop  You may remove the dressings and get the incision wet in the shower 72 hours after surgery  DO NOT remove steri-strips or sutures  DO NOT immerse the incision under water  Carefully pat the incision dry  If there is wound drainage, re-apply a fresh dry gauze dressing  Please call our office ([phone]) if you experience either of the following:  Sudden increase in swelling, redness, or warmth at the surgical site  Excessive incisional drainage that persists beyond the 3rd day after surgery  Oral temperature greater than 101 degrees, not relieved with Tylenol  Shortness of breath, chest pain, nausea, or any other concerning symptoms    SWELLING CONTROL:  Cold Therapy:   The cold therapy device may be used either continuously or only as needed, according to your preference  Do not let the pad directly touch your skin  Alternatively, apply ice (20 min on, 20 min off) as often as you feel is necessary  Elevation:  Elevate the entire leg above heart level  Place pillows under your ankle to keep your knee straight  Compression:  Apply ACE wraps or a thigh-length compression stocking as needed  RANGE OF MOTION:  You are NOT ALLOWED RANGE OF MOTION until you are given permission from your surgeon  IMMOBILIZATION:  Your knee brace should be WORN AND LOCKED AT ALL TIMES, including sleep  You may remove the brace only for showering  ACTIVITY:   DO NOT BEAR WEIGHT on the operative leg  Always use crutches  You may rest your foot on the ground for balance only  Using Crutches on Stairs:  Going up, lead with your "good" (nonoperative) leg  Going down, lead with your "bad" (operative) leg  Use a hand rail when available  Knee Extension:  Place a rolled towel or pillow under your ankle for 20-30 minutes 3-5 times per day  This will help to maintain full knee extension  Quad Sets:  Sit or lie with your knee straight  Tighten your quadriceps (front thigh) muscle  Hold for 3 seconds, then relax  Repeat 20 times per hour while awake  PHYSICAL THERAPY:  Begin therapy 5 TO 7 DAYS AFTER SURGERY  You were given a prescription for therapy at your preoperative office visit  If you do not have physical therapy scheduled yet, please call our office for assistance  FOLLOW-UP APPOINTMENT:  4-5 days after surgery with:    Dr Kelly [facility], [address] [facility]  [address] [place] [address], [address] [place] [address], [facility], [address] [facility]  [phone] ([facility])  [phone] (After-Hours)

## 2022-05-14 ENCOUNTER — HOSPITAL ENCOUNTER (EMERGENCY)
Facility: HOSPITAL | Age: 37
Discharge: HOME | End: 2022-05-14
Attending: EMERGENCY MEDICINE | Admitting: EMERGENCY MEDICINE
Payer: COMMERCIAL

## 2022-05-14 ENCOUNTER — NURSE TRIAGE (OUTPATIENT)
Dept: OTHER | Facility: OTHER | Age: 37
End: 2022-05-14

## 2022-05-14 VITALS
OXYGEN SATURATION: 98 % | TEMPERATURE: 98.5 F | SYSTOLIC BLOOD PRESSURE: 100 MMHG | RESPIRATION RATE: 14 BRPM | HEART RATE: 91 BPM | DIASTOLIC BLOOD PRESSURE: 59 MMHG

## 2022-05-14 DIAGNOSIS — M25.561 CHRONIC PAIN OF RIGHT KNEE: ICD-10-CM

## 2022-05-14 DIAGNOSIS — G89.18 POST-OPERATIVE PAIN: Primary | ICD-10-CM

## 2022-05-14 DIAGNOSIS — G89.29 CHRONIC PAIN OF RIGHT KNEE: ICD-10-CM

## 2022-05-14 DIAGNOSIS — Z98.890 H/O ARTHROSCOPIC KNEE SURGERY: ICD-10-CM

## 2022-05-14 PROCEDURE — 96376 TX/PRO/DX INJ SAME DRUG ADON: CPT

## 2022-05-14 PROCEDURE — 96374 THER/PROPH/DIAG INJ IV PUSH: CPT

## 2022-05-14 PROCEDURE — 99284 EMERGENCY DEPT VISIT MOD MDM: CPT

## 2022-05-14 PROCEDURE — 96375 TX/PRO/DX INJ NEW DRUG ADDON: CPT

## 2022-05-14 PROCEDURE — 99285 EMERGENCY DEPT VISIT HI MDM: CPT | Performed by: PHYSICIAN ASSISTANT

## 2022-05-14 RX ORDER — OXYCODONE HYDROCHLORIDE 10 MG/1
10 TABLET ORAL ONCE
Status: COMPLETED | OUTPATIENT
Start: 2022-05-14 | End: 2022-05-14

## 2022-05-14 RX ORDER — ACETAMINOPHEN 325 MG/1
975 TABLET ORAL ONCE
Status: COMPLETED | OUTPATIENT
Start: 2022-05-14 | End: 2022-05-14

## 2022-05-14 RX ORDER — ONDANSETRON 2 MG/ML
4 INJECTION INTRAMUSCULAR; INTRAVENOUS ONCE
Status: COMPLETED | OUTPATIENT
Start: 2022-05-14 | End: 2022-05-14

## 2022-05-14 RX ORDER — KETOROLAC TROMETHAMINE 30 MG/ML
15 INJECTION, SOLUTION INTRAMUSCULAR; INTRAVENOUS ONCE
Status: COMPLETED | OUTPATIENT
Start: 2022-05-14 | End: 2022-05-14

## 2022-05-14 RX ORDER — HYDROMORPHONE HCL/PF 1 MG/ML
0.5 SYRINGE (ML) INJECTION ONCE
Status: COMPLETED | OUTPATIENT
Start: 2022-05-14 | End: 2022-05-14

## 2022-05-14 RX ORDER — ACETAMINOPHEN 325 MG/1
650 TABLET ORAL ONCE
Status: COMPLETED | OUTPATIENT
Start: 2022-05-14 | End: 2022-05-14

## 2022-05-14 RX ORDER — HYDROMORPHONE HCL/PF 1 MG/ML
1 SYRINGE (ML) INJECTION ONCE
Status: COMPLETED | OUTPATIENT
Start: 2022-05-14 | End: 2022-05-14

## 2022-05-14 RX ADMIN — ACETAMINOPHEN 325MG 975 MG: 325 TABLET ORAL at 09:29

## 2022-05-14 RX ADMIN — ONDANSETRON 4 MG: 2 INJECTION INTRAMUSCULAR; INTRAVENOUS at 07:32

## 2022-05-14 RX ADMIN — OXYCODONE HYDROCHLORIDE 10 MG: 10 TABLET ORAL at 09:59

## 2022-05-14 RX ADMIN — KETOROLAC TROMETHAMINE 15 MG: 30 INJECTION, SOLUTION INTRAMUSCULAR at 12:35

## 2022-05-14 RX ADMIN — HYDROMORPHONE HYDROCHLORIDE 0.5 MG: 1 INJECTION, SOLUTION INTRAMUSCULAR; INTRAVENOUS; SUBCUTANEOUS at 09:25

## 2022-05-14 RX ADMIN — OXYCODONE HYDROCHLORIDE 10 MG: 10 TABLET ORAL at 13:17

## 2022-05-14 RX ADMIN — MORPHINE SULFATE 2 MG: 2 INJECTION, SOLUTION INTRAMUSCULAR; INTRAVENOUS at 15:03

## 2022-05-14 RX ADMIN — HYDROMORPHONE HYDROCHLORIDE 0.5 MG: 1 INJECTION, SOLUTION INTRAMUSCULAR; INTRAVENOUS; SUBCUTANEOUS at 07:29

## 2022-05-14 RX ADMIN — KETOROLAC TROMETHAMINE 15 MG: 30 INJECTION, SOLUTION INTRAMUSCULAR at 10:00

## 2022-05-14 RX ADMIN — ACETAMINOPHEN 325MG 650 MG: 325 TABLET ORAL at 12:36

## 2022-05-14 RX ADMIN — HYDROMORPHONE HYDROCHLORIDE 1 MG: 1 INJECTION, SOLUTION INTRAMUSCULAR; INTRAVENOUS; SUBCUTANEOUS at 11:50
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
## 2022-05-14 NOTE — TELEPHONE ENCOUNTER
S/P ARTHROSCOPY KNEE, reconstruction of ACL, PCL, and posterolateral corner (Left Knee) 05/13/22 with Madai Lane MD  Patient called with c/o severe pain, but then reports taking six (6) oxyCODONE (ROXICODONE) 5 immediate release tablets within a 30 minute  Poison control contacted, agreed to call EMS to immediate ED evaluation  Patient wife to contact [phone]
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
## 2022-05-14 NOTE — ANESTHESIA POSTPROCEDURE EVALUATION
Post-Op Assessment Note    CV Status:  Stable    Pain management: adequate     Mental Status:  Alert and awake   Hydration Status:  Euvolemic   PONV Controlled:  Controlled   Airway Patency:  Patent      Post Op Vitals Reviewed: Yes      Staff: Anesthesiologist         No complications documented      BP      Temp     Pulse     Resp      SpO2      /65   Pulse 84   Temp (!) 97 3 °F (36 3 °C) (Temporal)   Resp 16   Ht 6' (1 829 m)   Wt 70 4 kg (155 lb 3 3 oz)   SpO2 99%   BMI 21 05 kg/m²

## 2022-05-14 NOTE — TELEPHONE ENCOUNTER
Reason for Disposition   [1] [address] advised caller to go to ED AND [2] caller seeking second opinion    Answer Assessment - Initial Assessment Questions  1  SUBSTANCE: "What was swallowed?" If necessary, have the caller look at the label on the container  oxyCODONE (ROXICODONE) 5 immediate release tablet        2  AMOUNT: "How much was swallowed?" (Err on the side of recording the maximal amount that is missing)      Six (6) within 30 minutes  3  ONSET: "When was it probably swallowed?" (Minutes or hours ago)      Unsure  4  SYMPTOMS: "Do you have any symptoms?" If Yes, ask: "What are they?" (e g , abdominal pain, vomiting, weakness)      Continues with pain  5   SUICIDAL: "Did you take this to hurt or kill yourself?"      Denies Post- op    Protocols used: POISONING-ADULT-AH

## 2022-05-16 ENCOUNTER — TELEPHONE (OUTPATIENT)
Dept: OBGYN CLINIC | Facility: HOSPITAL | Age: 37
End: 2022-05-16

## 2022-05-16 DIAGNOSIS — S89.92XA LEFT KNEE INJURY, INITIAL ENCOUNTER: ICD-10-CM

## 2022-05-16 RX ORDER — OXYCODONE HYDROCHLORIDE 5 MG/1
5 TABLET ORAL EVERY 4 HOURS PRN
Qty: 30 TABLET | Refills: 0 | Status: SHIPPED | OUTPATIENT
Start: 2022-05-16 | End: 2022-05-17 | Stop reason: SDUPTHER

## 2022-05-16 NOTE — TELEPHONE ENCOUNTER
Pt contacted Call Center requested refill of their medication  Medication Name:  oxycodone      Dosage of Med: 5 mg      Frequency of Med:  1 every 4 hours, as needed for pain      Remaining Medication: 0      Pharmacy and Location:  [facility], [facility] Preferred Callback Phone Number:  [phone]            Thank you  PLEASE ADVISE PATIENTS:    REFILL REQUESTS WILL BE PROCESSED WITHIN 24-48 HOURS

## 2022-05-16 NOTE — TELEPHONE ENCOUNTER
Dr João Arthur    Patient called again crying, he had sx on 5/13 and he can't handle the pain and needs the medication ordered [facility] # [phone]    Romanian INTERPRETOR NEEDED

## 2022-05-16 NOTE — TELEPHONE ENCOUNTER
Dr Scot Warren  RE:  Refill requested    Patient is requesting a refill of:     oxyCODONE (ROXICODONE) 5 immediate release tablet [[id number]]     Order Details  Dose: 5 mg Route: Oral Frequency: Every 4 hours PRN for moderate pain   Dispense Quantity: 40 tablet Refills: 0    Note to Pharmacy: Continuation of Care         Sig: Take 1 tablet (5 mg total) by mouth every 4 (four) hours as needed for moderate pain for up to 10 days Max Daily Amount: 30 mg         Start Date: 05/13/22 End Date: 05/23/22   Written Date: 05/13/22 Expiration Date: 07/12/22   Earliest Fill Date: 05/13/22       Pharmacy    [facility] [address] [place] [address] [address] [facility] [address] [facility] [address]   Phone:  [phone]  Fax:  [phone]      Patient asking for c/b when filled

## 2022-05-17 ENCOUNTER — TELEPHONE (OUTPATIENT)
Dept: OBGYN CLINIC | Facility: CLINIC | Age: 37
End: 2022-05-17

## 2022-05-17 ENCOUNTER — OFFICE VISIT (OUTPATIENT)
Dept: OBGYN CLINIC | Facility: CLINIC | Age: 37
End: 2022-05-17
Payer: COMMERCIAL

## 2022-05-17 VITALS — BODY MASS INDEX: 20.99 KG/M2 | HEIGHT: 72 IN | WEIGHT: 155 LBS

## 2022-05-17 DIAGNOSIS — S93.325A LISFRANC DISLOCATION, LEFT, INITIAL ENCOUNTER: Primary | ICD-10-CM

## 2022-05-17 DIAGNOSIS — S89.92XA LEFT KNEE INJURY, INITIAL ENCOUNTER: ICD-10-CM

## 2022-05-17 DIAGNOSIS — S92.312A CLOSED DISPLACED FRACTURE OF FIRST METATARSAL BONE OF LEFT FOOT, INITIAL ENCOUNTER: Primary | ICD-10-CM

## 2022-05-17 DIAGNOSIS — S93.325A LISFRANC DISLOCATION, LEFT, INITIAL ENCOUNTER: ICD-10-CM

## 2022-05-17 PROCEDURE — 99214 OFFICE O/P EST MOD 30 MIN: CPT | Performed by: ORTHOPAEDIC SURGERY

## 2022-05-17 RX ORDER — OXYCODONE HYDROCHLORIDE 10 MG/1
10 TABLET ORAL EVERY 4 HOURS PRN
Qty: 30 TABLET | Refills: 0 | Status: SHIPPED | OUTPATIENT
Start: 2022-05-17 | End: 2022-05-26

## 2022-05-17 RX ORDER — NALOXONE HYDROCHLORIDE 4 MG/.1ML
SPRAY NASAL
Qty: 1 EACH | Refills: 1 | Status: SHIPPED | OUTPATIENT
Start: 2022-05-17 | End: 2022-07-28 | Stop reason: ALTCHOICE

## 2022-05-17 RX ORDER — CHLORHEXIDINE GLUCONATE 4 G/100ML
SOLUTION TOPICAL DAILY PRN
Status: CANCELLED | OUTPATIENT
Start: 2022-05-17

## 2022-05-17 RX ORDER — CHLORHEXIDINE GLUCONATE 0.12 MG/ML
15 RINSE ORAL ONCE
Status: CANCELLED | OUTPATIENT
Start: 2022-05-17 | End: 2022-05-17

## 2022-05-17 NOTE — TELEPHONE ENCOUNTER
Patient is taking 20 mg every 3 hours of oxycodone per the ER note  He had an RX written on 5/16 for 30 tablets of 5 mg and has 2 tabs left  I will prescribe 10 mg tablets  He can offset with tylenol for pain control

## 2022-05-17 NOTE — PROGRESS NOTES
BONILLA Ramsey  Attending, Orthopaedic Surgery  [facility] [address] [facility] [address] [facility]      ORTHOPAEDIC FOOT AND ANKLE CLINIC VISIT     Assessment:     Encounter Diagnoses   Name Primary?  Closed displaced fracture of first metatarsal bone of left foot, initial encounter Yes    Lisfranc dislocation, left, initial encounter             Plan:   · The patient verbalized understanding of exam findings and treatment plan  We engaged in the shared decision-making process and treatment options were discussed at length with the patient  Surgical and conservative management discussed today along with risks and benefits  · Addie Pedraza has a left Lisfranc fracture dislocation, 1st MT fracture and 5th MT neck fracture  We discussed with him via  that the Lisfranc fracture dislocation needs surgical fixation for stabilize the foot  · His questions were answered and informed consent was obtained via  line  · He will maintain NWB of the LLE in cam boot and elevate the foot  · A refill for pain medication was sent yesterday by Adria Castano with Dr Torri Kyle  · He will stop the Lovenox the morning of surgery  Return for Post-op  CONSENT FOR BONY PROCEDURES:   Patient understands that there is no guarantee that the surgery will relieve all of His pain and also understands that there may be a prolonged course of protected weight-bearing status required which will restrict them from driving and other activities as discussed at today's visit  Patient recognizes that there are risks with surgery including bleeding, numbness, nerve irritation, wound complications, infection, continued pain, joint stiffness, malunion, nonunion, anesthetic complications, death, failure of procedure and possible need for further surgery  The patient understands that there is no guarantee that this surgery will relieve all of His pain and symptoms    Patient understands that there is no guarantee that they will return to full function after the procedure  Patient has provided informed consent for the procedure  History of Present Illness:   Chief Complaint:   Chief Complaint   Patient presents with   [address] is a 39 y o  male who is being seen for left Lisfranc fracture dislocation  He was seen in the hospital and was recommended to follow up that week  He was not seen but did have a left knee ACL/PCL/PLC reconstruction on 5/13 with Dr Scot Warren  Pain is localized at midfoot and 1st MT with minimal radiating and described as sharp and severe  Patient denies numbness, tingling or radicular pain  Denies history of neuropathy  Patient does not smoke, does not have diabetes and does take blood thinners  Patient denies family history of anesthesia complications and has not had any complications with anesthesia  Pain/symptom timing:  Worse during the day when active  Pain/symptom context:  Worse with activites and work  Pain/symptom modifying factors:  Rest makes better, activities make worse  Pain/symptom associated signs/symptoms: none    Prior treatment   · NSAIDsYes   · Injections No   · Bracing/Orthotics Yes    · Physical Therapy No     Orthopedic Surgical History:   See below    Past Medical, Surgical and Social History:  Past Medical History:  has a past medical history of Chronic back pain and Chronic pain disorder  Problem List: does not have any pertinent problems on file  Past Surgical History:  has a past surgical history that includes Cholecystectomy; Appendectomy; and Knee arthroscopy w/ ACL reconstruction (Left, 5/13/2022)  Family History: family history includes Hyperthyroidism in his mother  Social History:  reports that he quit smoking about 3 weeks ago  He has never used smokeless tobacco  He reports current drug use  Frequency: 3 00 times per week  Drug: Marijuana  He reports that he does not drink alcohol    Current Medications: has a current medication list which includes the following prescription(s): acetaminophen, cyclobenzaprine, enoxaparin, gabapentin, methocarbamol, ondansetron, and senna-docusate sodium  Allergies: is allergic to shellfish-derived products - food allergy and aspirin  Review of Systems:  General- denies fever/chills  HEENT- denies hearing loss or sore throat  Eyes- denies eye pain or visual disturbances, denies red eyes  Respiratory- denies cough or SOB  Cardio- denies chest pain or palpitations  GI- denies abdominal pain  Endocrine- denies urinary frequency  Urinary- denies pain with urination  Musculoskeletal- Negative except noted above  Skin- denies rashes or wounds  Neurological- denies dizziness or headache  Psychiatric- denies anxiety or difficulty concentrating    Physical Exam:   Ht 6' (1 829 m)   Wt 70 3 kg (155 lb)   BMI 21 02 kg/m²   General/Constitutional: No apparent distress: well-nourished and well developed  Eyes: normal ocular motion  Cardio: RRR, Normal S1S2, No m/r/g  Lymphatic: No appreciable lymphadenopathy  Respiratory: Non-labored breathing, CTA b/l no w/c/r  Vascular: No edema, swelling or tenderness, except as noted in detailed exam   Integumentary: No impressive skin lesions present, except as noted in detailed exam   Neuro: No ataxia or tremors noted  Psych: Normal mood and affect, oriented to person, place and time  Appropriate affect  Musculoskeletal: Normal, except as noted in detailed exam and in HPI      Examination    Left    Gait Not assessed   Musculoskeletal Tender to palpation at midfoot, 1st MT    Skin Normal       Nails Normal    Range of Motion  10 degrees dorsiflexion, 30 degrees plantarflexion  Subtalar motion: not assessed    Stability Stable    Muscle Strength 5/5 tibialis anterior  5/5 gastrocnemius-soleus  5/5 EHL  5/5 FHL    Neurologic Normal    Sensation Intact to light touch throughout sural, saphenous, superficial peroneal, deep peroneal and medial/lateral plantar nerve distributions  [place] 5 07 filament (10g) testing was deferred  Cardiovascular Brisk capillary refill < 2 seconds,intact DP and PT pulses    Special Tests None      Imaging Studies:   3 views of the left foot were available, reviewed and interpreted independently that demonstrate fracture of 1st MT and 5th MT neck, base of 2nd MT fracture  Reviewed by me personally  Scribe Attestation    I,:  Abby Nguyen PA-C am acting as a scribe while in the presence of the attending physician :       I,:  Jabier Cogan, MD personally performed the services described in this documentation    as scribed in my presence :             Talbert Elks Lachman, MD  Foot & Ankle Surgery   Department of [address] [place] [address]      I personally performed the service  Talbert Elks Lachman, MD

## 2022-05-17 NOTE — PATIENT INSTRUCTIONS
BONILLA Zarate  Attending, [address] [place] [address] Office Phone: [phone] ? Fax: [phone]  [facility] Office Phone: [phone] ? WQR:[phone]    : Amish Nayak) [place], [place]     Surgery Coordinators Luis Fernando Alvarado: Jessica Logan, [phone]  Kalli Andersen, [phone]  Surgery Coordinator Brii:  Bipin Aquino, [address] [facility], [phone]  www [email or web address]   PRE-OPERATIVE AND POST-OPERATIVE INSTRUCTIONS    General Information:  Your surgery is with Dr Sandi Olszewski  Dates can change (although rare) depending on emergencies  Typical post operative visits are at the following intervals:  3 weeks post surgery(except 1 week for bunions and wound monitoring), 6 weeks post surgery, 3 months post surgery, 6 months post surgery, and then on a yearly basis  However, this may change based on Dr Kedar Dumont recommendation  #1 post-operative rule for foot/ankle surgery:  ONCE YOU ARE OUT OF YOUR CAST AND/OR REMOVABLE BOOT, SWELLING MAY PERSIST FOR MANY MONTHS  YOU MIGHT ALSO EXPERIENCE A BLUISH DISCOLORATION OF YOUR LEG  THIS IS NORMAL AND PART OF THE USUAL POSTOPERATIVE EXPERIENCE  SMOKING:  Smoking results in incomplete healing of fractures (broken bones) and joints that my have been fused  Smoking and nicotine also prevents the growth of bone into ankle replacements and bone healing  It also slows the healing of muscles and skin (soft tissue)  Therefore, please do not have surgery if you continue to smoke  We reserve the right to cancel your surgery if we suspect that you are smoking  DO NOT use nicorette gum or other patches  Please find an alternative method to quit smoking before your surgery  Pre-Operative Information:  Surgery date and preoperative visits:   If you have medical problems, such as an abnormal EKG, history of BLOOD CLOT, ANEURYSM, and any other heart condition, please inform us so that we can get your medical clearance several weeks before the surgery  Please bring any important medical information, such as an EKG, chest x-ray, or echocardiogram, with you to ensure that your surgery will not be delayed  If needed, you will receive your preoperative appointments in the mail or by phone from our scheduling office  The location of the preoperative appointment will be given to you also  You may not eat after midnight the night before surgery  If you do, your surgery will be cancelled  You will receive a phone call from your surgery center the day before your surgery (if your surgery is on a Monday, you will get a call the Friday before)  If you do not hear from someone by 4pm the day before your surgery, please call the Surgical coordinator (number above) to notify us  Start taking Vitamin D3 4000 units per day and Calcium 1200mg per day immediately  You will continue this until your 3 month post-op visit  These are over the counter and available at all pharmacies and supermarkets  FOR THOSE HAVING SURGERY AT [address] [place] [address] Avenue WILL NEED CRUTCHES OR A ROLLING WALKER AFTER SURGERY, ASK FOR A PRESCRIPTION FOR THIS FROM OUR OFFICE TODAY  THIS CANNOT BE HANDLED THE DAY OF SURGERY AS [facility] DOES NOT STOCK THESE  Because bacterial can often enter any defect in the skin, it is important to avoid any cuts before surgery  Any breaks in the skin on the leg will often result in your surgery being postponed  Please avoid going on a very long walk the day prior to surgery, or doing other activities that could lead to irritation of the skin, including yard work, extra athletic activity, or shaving  This could result in surgery cancellation  You MUST be fasting the day of your surgery  Therefore, please do not consume any foot or beverage after midnight the night before surgery  The morning of surgery you may take your usual medications with a sip of water  It is important not to take anti-inflammatory medication like Ibuprofen, Motrin, Naproxen (Aleve), or Aspirin 7-10 days before surgery because they will make you bleed more than usual   Vitamin, E, Plavix and Coumadin also have the same effect  Stop Aspirin and Vitamin E two weeks before surgery  YOUR MEDICAL DOCTOR SHOULD TELL YOU WHEN TO STOP COUMADIN OR PLAVIX  If your surgery involves any bone healing, please do not take anti-inflammatories for at least 6 weeks after surgery  This can impede bone healing (ibuprofen, Aleve, Relafen, iodine)  Tylenol is fine to take  PREOPERATIVE BATHING INSTRUCTIONS:    Before your surgery, bathe with Hibiclens (4% Chlorhexidene) as instructed below  This skin cleanser will help reduce the bacteria on your skin before surgery  To avoid irritating your eyes, do not apply Hibiclens above the level of your neck  On the evening before AND the morning of surgery, bathe your entire body except the face and scalp, then rinse freely  DO NOT apply to your face or scalp, as Hibiclens can irritate your eyes  Purchasing information:   Hibiclens is available without a prescription at [facility]  ADDITIONAL INSTRUCTIONS:  PATIENTS HAVING FOOT/ANKLE SURGERY     In preparation for your upcoming surgery, we kindly request and advise the following:  Notify our office if you are taking any of the following:  Coumadin (warfarin):  Persantine (dipyridamole); Pletal (cilostazol); Plavix (clopidogrel); Ticlid (ticlopidine); Agrylin (anagrelide); Aggrenox (dipyridamole and aspirin) or other blood thinners,  In addition, stop taking Vitamin E and herbal supplements  Do not schedule any elective dental work for at least 6 months after surgery  If you had an ankle replacement, you will need to take antibiotics before any future dental procedures   Your dentist or our office can prescribe these for you  1000mg of Amoxicillin 1 hour prior to any dental procedure is the recommended dosing  THREE RULES:    After surgery you will most likely be given the instructions KEEP YOUR TOES ABOVE YOUR NOSE    This means that you MUST have your feet elevated higher than your heart  Keeping your toes above your nose helps to heal the muscles and skin (soft tissues) by reducing swelling in your leg  This position also helps to prevent infection, and is very important in avoiding deep venous thrombosis (blood clots)  In order to keep the blood circulating in your legs and in order to avoid deep vein   thrombosis (blood clots), we ask patients to GET UP ONCE AN HOUR during the day  This means you should at least cross the room and come back  It does not mean you have to be up for long periods of time  In most cases we will not have people immediately put any weight on their operated part  This is important to prevent loosening of metal or other devices holding the bones together  It also prevents irritation of the soft tissues which can lead to prolonged healing  When we say get up once an hour, please walk, hop or move with an assisted device  This is important! Do not do any excessive walking during the first few days after surgery  Recovering from surgery is a full-time task for the patient  Postoperative care is important to avoid irritating the skin incision, which can lead to infection  Please do not plan activities or go out of town for several weeks after surgery  If you are unsure about your future activities, please schedule surgery only when you know it is acceptable for you  Scheduling surgery and then canceling the date, prevents other people from having surgery on that date as it takes time to line everything up effectively  If you cancel your surgery the week of your planned surgery, we reserve the right to cancel all future surgical procedures      THE DAY OF SURGERY:    Arrival to the hospital or outpatient surgical center on time is imperative  If you arrive late, then your surgery will be cancelled  You MUST have a family member/friend bring you, stay with you throughout the DURATION of your surgery, and drive you home  You MUST be fasting the day of your surgery  Therefore, do not consume any food or beverage after midnight the night before surgery  At your pre-operative visit with the anesthesia staff, or during your phone screen, a nurse will instruct you what medications you will need to take the day of surgery  MAKE SURE THAT THE PHARMACY LISTED IN THE ELECTRONIC MEDICAL RECORD (EPIC) IS YOUR PREFERRED PHARMACY  For example, if you are staying with family or a friend, and will not be near your preferred pharmacy, YOU MUST, tell the nurses checking you in the day of surgery so that this can be changed in the system  If your prescriptions are sent to a pharmacy, this cannot be changed  AFTER YOUR SURGERY:  Bleeding through the bandage almost always occurs  Do not let this alarm you  Simply add more gauze or a towel, call us, and come in for a dressing change  If you think it is excessive, contact us immediately or go to the local emergency room  Do not get the bandage wet  Showering is possible with plastic protectors  Be very careful, as the bathroom can be wet and slippery  If you do get your dressing wet, it should be changed immediately  Please contact us  ONCE YOUR ARE OUT OF YOUR CAST AND/OR REMOVABLE BOOT, SWELLING MAY PERSIST FOR MANY MONTHS  YOU MIGHT ALSO EXPERIENCE A BLUISH DISCOLORATION OF YOUR LEG  THIS IS NORMAL AND PART OF THE USUAL POSTOPERATIVE EXPERIENCE  WEARING COMPRESSION HOSE (ELASTIC STOCKINGS) CAN HELP AVOID SOME OF THIS SWELLING  DRESSING:   The purpose of the surgical dressing is to keep your wound and the surgical site protected from the environment    Most dressings contain splints, which help to hold your foot and ankle in a corrected position, and also allow the surgical site to heal properly  Dressings will remain in place and undisturbed until the first postop visit  If you have a drain in place, this will need to be removed in 1-3 days after surgery  The time for the drain to be pulled will be written on your discharge instruction sheet  CAST  INSTRUCTIONS:  You may or may not get a cast following surgery  If you do, pay close attention to the following:    After application of a splint or cast, it is very important to elevate your leg for 24 to 72 hours  The injured area should be elevated well above the heart  Remember Toes above your Nose  Rest and elevation greatly reduce pain and speed the healing process by minimizing early swelling  CALL YOUR DOCTORS OFFICE OR VISIT LOCATION EMERGENCY ROOM IF YOU HAVE ANY OF THE FOLLOWING:    Significant increased pain, which may be caused by swelling, and the feeling that the splint or cast is too tight  Numbness and tingling in your hand or foot, which may be caused by too much pressure on the nerves  Burning and stinging, which may be caused by too much pressure on the skin  Excessive swelling below the cast, which may mean the cast is slowing your blood circulation  Loss of active movement of toes, which request an urgent evaluation  Loss of capillary refill  Pinch the tip of toes and dariusz the skin  Release pressure and if the skin does not return pink then call the office immediately  DO NOT GET YOUR CAST WET  Bacteria thrive in moist dark areas  We do not want this  If your cast becomes wet, return to the office and we will apply another one  PAIN AFTER SURGERY:  Narcotic pain medication can and will depress your respiratory system if taken in excess  The goal of pain management with narcotics is to be comfortable not pain free  If you take enough narcotics to be pain free then you run the risk of stopping breathing    If this happens, call [phone] immediately! Pain in the heel is often  caused by pressure from the weight of your foot on the bed  Make sure your heel is suspended off the bed by keeping a pillow underneath your calf not your knee  Medications: You will be given narcotic pain medication  Do NOT drive while taking narcotic medications  Medications such as Darvocet, Percocet, Vicoden or Tylenol #3, also contain acetaminophen (Tylenol)  Do not take acetaminophen or Tylenol from home when taking theses medications  When you fill your prescription, you may ask the pharmacist if your pain medication has acetaminophen/Tylenol in it  It is okay to take Tylenol with Oxycontin/Oxycodone  Should you have pain after taking your prescription medication, ibuprophen (Motrin, Advil, and Alleve) is a common over the counter preparation and may often be taken with the prescription pain medication as long as you take them with food  These medications can irritate the stomach lining  Unless you are allergic to aspirin or currently taking a blood thinner, Dr Brigitte Childress patients are requested to take one 325 mg aspirin every 12 hours until you are back to walking normally after surgery (This can be up to 6 weeks)  Narcotic medications commonly cause nausea  Taking them with food will decrease this side effect  If you are having extreme nausea, please contact us for an alternative medication or for something that can be taken with this medication to decrease the nausea  Also, narcotic medications frequently cause constipation  An increase of fiber, fruits and vegetables in your diet may alleviate this problem, or if necessary, you may use an over-the-counter medication such as senekot, colace, or Fibercon for constipation problems  You should resume all medications you were taking prior to the surgery unless otherwise specified  Activity:   Because of your recent foot surgery, your activity level will decrease   You will need to elevate your foot ABOVE the level of your heart for a minimum of four days  The length of time necessary for the swelling to go down, and for your wounds to heal properly depends greatly on your efforts here  Elevation is extremely important to avoid compromising the blood supply to your foot  Remember when your foot is down it will swell, which will increase pain and slow healing  Wiggle your toes frequently if possible  If you go home with a regional block, (a type of anesthesia) the foot and leg will be numb  Think of ways to get into your house and around the house until the block wears off  Keep in mind that it may be a legal issue if you drive while in a cast or splint, especially when the splint is on the right foot  You may call the Department of Motor Vehicles to schedule a road test if you have adaptive equipment applied to your car  The amount of weight you are allowed to bear on your foot will be written on your discharge sheet filled out at the time of surgery  The following is an explanation of the possibilities:       [id number] has the following policies when it comes to ELECTIVE surgery  No elective surgery requiring anesthesia until 7 weeks after a patient tested positive for COVID-19   No elective surgery requiring anesthesia until 3 months after a patient was hospitalized for COVID-19      Non-weight bearing: You are to put NO weight whatsoever on your foot  When using crutches or a walker, your foot should not touch the ground, except when you are standing  Then, it may rest on the ground  If you are to be non-weight bearing, and you are not compliant, you could compromise the surgery  Some of our patients have been requesting prescriptions for a roll-a-bout knee scooter  [employer] and other insurances have been denying these claims, and you may either have to rent one or pay out of pocket to purchase one    THIS SHOULD BE PURCHASED PRIOR TO THE SURGERY AND YOU SHOULD BRING IT WITH YOU THE DAY OF THE SURGERY TO AIDE IN GETTING FROM THE CAR INTO THE HOUSE AFTER SURGERY

## 2022-05-17 NOTE — H&P (VIEW-ONLY)
BONILLA Marion  Attending, Orthopaedic Surgery  Foot and [address] [address] [facility]      ORTHOPAEDIC FOOT AND ANKLE CLINIC VISIT     Assessment:     Encounter Diagnoses   Name Primary?  Closed displaced fracture of first metatarsal bone of left foot, initial encounter Yes    Lisfranc dislocation, left, initial encounter             Plan:   · The patient verbalized understanding of exam findings and treatment plan  We engaged in the shared decision-making process and treatment options were discussed at length with the patient  Surgical and conservative management discussed today along with risks and benefits  · Jonnathan Mcclain has a left Lisfranc fracture dislocation, 1st MT fracture and 5th MT neck fracture  We discussed with him via  that the Lisfranc fracture dislocation needs surgical fixation for stabilize the foot  · His questions were answered and informed consent was obtained via  line  · He will maintain NWB of the LLE in cam boot and elevate the foot  · A refill for pain medication was sent yesterday by Jean Marie Aguilera with Dr Delgadillo Form  · He will stop the Lovenox the morning of surgery  Return for Post-op  CONSENT FOR BONY PROCEDURES:   Patient understands that there is no guarantee that the surgery will relieve all of His pain and also understands that there may be a prolonged course of protected weight-bearing status required which will restrict them from driving and other activities as discussed at today's visit  Patient recognizes that there are risks with surgery including bleeding, numbness, nerve irritation, wound complications, infection, continued pain, joint stiffness, malunion, nonunion, anesthetic complications, death, failure of procedure and possible need for further surgery  The patient understands that there is no guarantee that this surgery will relieve all of His pain and symptoms    Patient understands that there is no guarantee that they will return to full function after the procedure  Patient has provided informed consent for the procedure  History of Present Illness:   Chief Complaint:   Chief Complaint   Patient presents with   [address] is a 39 y o  male who is being seen for left Lisfranc fracture dislocation  He was seen in the hospital and was recommended to follow up that week  He was not seen but did have a left knee ACL/PCL/PLC reconstruction on 5/13 with Dr Garrett Masterson  Pain is localized at midfoot and 1st MT with minimal radiating and described as sharp and severe  Patient denies numbness, tingling or radicular pain  Denies history of neuropathy  Patient does not smoke, does not have diabetes and does take blood thinners  Patient denies family history of anesthesia complications and has not had any complications with anesthesia  Pain/symptom timing:  Worse during the day when active  Pain/symptom context:  Worse with activites and work  Pain/symptom modifying factors:  Rest makes better, activities make worse  Pain/symptom associated signs/symptoms: none    Prior treatment   · NSAIDsYes   · Injections No   · Bracing/Orthotics Yes    · Physical Therapy No     Orthopedic Surgical History:   See below    Past Medical, Surgical and Social History:  Past Medical History:  has a past medical history of Chronic back pain and Chronic pain disorder  Problem List: does not have any pertinent problems on file  Past Surgical History:  has a past surgical history that includes Cholecystectomy; Appendectomy; and Knee arthroscopy w/ ACL reconstruction (Left, 5/13/2022)  Family History: family history includes Hyperthyroidism in his mother  Social History:  reports that he quit smoking about 3 weeks ago  He has never used smokeless tobacco  He reports current drug use  Frequency: 3 00 times per week  Drug: Marijuana  He reports that he does not drink alcohol    Current Medications: has a current medication list which includes the following prescription(s): acetaminophen, cyclobenzaprine, enoxaparin, gabapentin, methocarbamol, ondansetron, and senna-docusate sodium  Allergies: is allergic to shellfish-derived products - food allergy and aspirin  Review of Systems:  General- denies fever/chills  HEENT- denies hearing loss or sore throat  Eyes- denies eye pain or visual disturbances, denies red eyes  Respiratory- denies cough or SOB  Cardio- denies chest pain or palpitations  GI- denies abdominal pain  Endocrine- denies urinary frequency  Urinary- denies pain with urination  Musculoskeletal- Negative except noted above  Skin- denies rashes or wounds  Neurological- denies dizziness or headache  Psychiatric- denies anxiety or difficulty concentrating    Physical Exam:   Ht 6' (1 829 m)   Wt 70 3 kg (155 lb)   BMI 21 02 kg/m²   General/Constitutional: No apparent distress: well-nourished and well developed  Eyes: normal ocular motion  Cardio: RRR, Normal S1S2, No m/r/g  Lymphatic: No appreciable lymphadenopathy  Respiratory: Non-labored breathing, CTA b/l no w/c/r  Vascular: No edema, swelling or tenderness, except as noted in detailed exam   Integumentary: No impressive skin lesions present, except as noted in detailed exam   Neuro: No ataxia or tremors noted  Psych: Normal mood and affect, oriented to person, place and time  Appropriate affect  Musculoskeletal: Normal, except as noted in detailed exam and in HPI      Examination    Left    Gait Not assessed   Musculoskeletal Tender to palpation at midfoot, 1st MT    Skin Normal       Nails Normal    Range of Motion  10 degrees dorsiflexion, 30 degrees plantarflexion  Subtalar motion: not assessed    Stability Stable    Muscle Strength 5/5 tibialis anterior  5/5 gastrocnemius-soleus  5/5 EHL  5/5 FHL    Neurologic Normal    Sensation Intact to light touch throughout sural, saphenous, superficial peroneal, deep peroneal and medial/lateral plantar nerve distributions  [place] 5 07 filament (10g) testing was deferred  Cardiovascular Brisk capillary refill < 2 seconds,intact DP and PT pulses    Special Tests None      Imaging Studies:   3 views of the left foot were available, reviewed and interpreted independently that demonstrate fracture of 1st MT and 5th MT neck, base of 2nd MT fracture  Reviewed by me personally  Scribe Attestation    I,:  Lucia Gottlieb PA-C am acting as a scribe while in the presence of the attending physician :       I,:  Cherelle Rebollar MD personally performed the services described in this documentation    as scribed in my presence :             Albertine Pedro Lachman, MD  Foot & Ankle Surgery   Department of [address] [place] [address]      I personally performed the service  Albertine Pedro Lachman, MD

## 2022-05-18 NOTE — TELEPHONE ENCOUNTER
Patient sees Dr Valles Settler  Patient is calling about back about the Oxycodone, Aubrey Fraser is asking for diagnostic codes and the reasoning of the medication to release the medication    Fax # [phone] in [address] [place] [address] # [phone]

## 2022-05-18 NOTE — TELEPHONE ENCOUNTER
I spoke with [facility] and patient needs Prior auth for oxycodone med      [address]  AGE#[id number]  ZCD#[id number]  ID# [id number]  ABL#[id number]

## 2022-05-21 ENCOUNTER — TELEPHONE (OUTPATIENT)
Dept: OBGYN CLINIC | Facility: HOSPITAL | Age: 37
End: 2022-05-21

## 2022-05-21 NOTE — TELEPHONE ENCOUNTER
Patient sees Dr Marisa Alejandro  Patient is calling in stating that he had surgery on 5/13 to his left knee  He is asking if he is able to get a medication refill on Oxycodone 10 mg tabs  He currently has 2 pills left and is asking if this can be refilled and sent to [facility] on file and for a call once completed          Call back# [phone]

## 2022-05-23 ENCOUNTER — TELEPHONE (OUTPATIENT)
Dept: OBGYN CLINIC | Facility: CLINIC | Age: 37
End: 2022-05-23

## 2022-05-23 DIAGNOSIS — S89.92XA LEFT KNEE INJURY, INITIAL ENCOUNTER: Primary | ICD-10-CM

## 2022-05-23 RX ORDER — OXYCODONE HYDROCHLORIDE 5 MG/1
5 TABLET ORAL EVERY 4 HOURS PRN
Qty: 40 TABLET | Refills: 0 | Status: ON HOLD | OUTPATIENT
Start: 2022-05-23 | End: 2022-05-26 | Stop reason: SDUPTHER

## 2022-05-23 NOTE — TELEPHONE ENCOUNTER
Patient called to follow up on his request for Oxycodone 10 MG to be sent to [facility] [address]  He is s/p 5/13/22 l ACL PCL reconstruction    Please call him once ordered [phone]  Thank you

## 2022-05-23 NOTE — TELEPHONE ENCOUNTER
Attempted to receive approval for oxycodone preaut (submitted 5/19/22) through [email or web address] / and [facility]  Authorization was not accepted or approved and preaut application was resubmitted today      Key: [id number]

## 2022-05-23 NOTE — TELEPHONE ENCOUNTER
Please contact the patient let him know we did send in a refill of oxycodone 5 mg tablets to the pharmacy  He needs to continue to wean off the medication at this time meaning he should take 1 5 mg tablet every 4-6 hours and only when necessary take 2  He should also be supplementing with frequent ice and Tylenol every 6 hours  Tomorrow at his appointment we do have to discuss continuing to wean his medications

## 2022-05-24 ENCOUNTER — OFFICE VISIT (OUTPATIENT)
Dept: OBGYN CLINIC | Facility: MEDICAL CENTER | Age: 37
End: 2022-05-24

## 2022-05-24 VITALS
HEART RATE: 92 BPM | SYSTOLIC BLOOD PRESSURE: 117 MMHG | HEIGHT: 72 IN | DIASTOLIC BLOOD PRESSURE: 72 MMHG | BODY MASS INDEX: 20.99 KG/M2 | WEIGHT: 155 LBS

## 2022-05-24 DIAGNOSIS — S89.92XA LEFT KNEE INJURY, INITIAL ENCOUNTER: Primary | ICD-10-CM

## 2022-05-24 PROCEDURE — 99024 POSTOP FOLLOW-UP VISIT: CPT | Performed by: ORTHOPAEDIC SURGERY

## 2022-05-24 NOTE — PROGRESS NOTES
Orthopaedic Surgery - Office Note  Sandro Cast (39 y o  male)   : 1985   MRN: [id number]  Encounter Date: 2022    Chief Complaint   Patient presents with    Left Knee - Post-op    Right Knee - Post-op       Assessment / Plan  Left knee ACL reconstruction, PCL reconstruction, posterolateral ligament complex reconstruction, MUGA 22    · Start physical therapy per protocol, patient was provided with a copy of the PCL protocol  · Patient instructed to perform Quad sets multiple times daily  · Continue to ice  · Discussed that he needs to taper down of his pain medications  Return in about 4 weeks (around 2022)  History of Present Illness  Sandro Cast is a 39 y o  male who presents today PO Left knee ACL reconstruction, PCL reconstruction, posterolateral ligament complex reconstruction, MUGA 22  Patient reports he had to return to hospital the following day for pain control  Review of Systems  Pertinent items are noted in HPI  All other systems were reviewed and are negative  Physical Exam  /72   Pulse 92   Ht 6' (1 829 m)   Wt 70 3 kg (155 lb)   BMI 21 02 kg/m²   Cons: Appears well  No apparent distress  Psych: Alert  Oriented x3  Mood and affect normal   Eyes: PERRLA, EOMI  Resp: Normal effort  No audible wheezing or stridor  CV: Palpable pulse  No discernable arrhythmia  No LE edema  Lymph:  No palpable cervical, axillary, or inguinal lymphadenopathy  Skin: Warm  No palpable masses  No visible lesions  Neuro: Normal muscle tone  Normal and symmetric DTR's  Left Knee Exam  Alignment:  Normal knee alignment  Inspection:  moderate swelling  large effusion, healing abrasion   Palpation:  diffuse tenderness  ROM:  Not tested  Strength:  Not tested  Stability:  Not tested  Tests:  No pertinent positive or negative tests  Patella:  Not tested  Neurovascular:  Sensation intact in DP/SP/Jones/Sa/T nerve distributions  Gait:  Not tested  Studies Reviewed  No studies to review    Procedures  No procedures today  Medical, Surgical, Family, and Social History  The patient's medical history, family history, and social history, were reviewed and updated as appropriate      Past Medical History:   Diagnosis Date    Chronic back pain     Chronic pain disorder        Past Surgical History:   Procedure Laterality Date    APPENDECTOMY      CHOLECYSTECTOMY      KNEE ARTHROSCOPY W/ ACL RECONSTRUCTION Left 2022    Procedure: ARTHROSCOPY KNEE, reconstruction of ACL, PCL, and posterolateral corner;  Surgeon: Amanda Gunn MD;  Location: [facility] OR;  Service: Orthopedics       Family History   Problem Relation Age of Onset    Hyperthyroidism Mother     Colon cancer Neg Hx        Social History     Occupational History    Not on file   Tobacco Use    Smoking status: Former Smoker     Quit date: 2022     Years since quittin 0    Smokeless tobacco: Never Used    Tobacco comment: about 2 cigarettes max every couple of days, vape   Vaping Use    Vaping Use: Never used   Substance and Sexual Activity    Alcohol use: Never     Comment: occassional    Drug use: Yes     Frequency: 3 0 times per week     Types: Marijuana     Comment: last used 2022  - 1800    Sexual activity: Not on file       Allergies   Allergen Reactions    Shellfish-Derived Products - Food Allergy Anaphylaxis and Rash    Aspirin Edema         Current Outpatient Medications:     acetaminophen (TYLENOL) 325 mg tablet, Take 3 tablets (975 mg total) by mouth every 8 (eight) hours, Disp: 30 tablet, Rfl: 0    enoxaparin (enoxaparin) 30 mg/0 3 mL, Inject 0 3 mL (30 mg total) under the skin every 12 (twelve) hours for 28 days, Disp: 16 8 mL, Rfl: 0    gabapentin (NEURONTIN) 100 mg capsule, Take 1 capsule (100 mg total) by mouth 3 (three) times a day, Disp: 60 capsule, Rfl: 0    methocarbamol (ROBAXIN) 750 mg tablet, Take 1 tablet (750 mg total) by mouth every 6 (six) hours, Disp: 45 tablet, Rfl: 0    naloxone (NARCAN) 4 mg/0 1 mL nasal spray, Administer 1 spray into a nostril   If no response after 2-3 minutes, give another dose in the other nostril using a new spray , Disp: 1 each, Rfl: 1    ondansetron (ZOFRAN-ODT) 4 mg disintegrating tablet, Take 1 tablet (4 mg total) by mouth every 8 (eight) hours as needed for nausea or vomiting, Disp: 15 tablet, Rfl: 0    oxyCODONE (ROXICODONE) 10 MG TABS, Take 1 tablet (10 mg total) by mouth every 4 (four) hours as needed for moderate pain Max Daily Amount: 60 mg, Disp: 30 tablet, Rfl: 0    oxyCODONE (ROXICODONE) 5 immediate release tablet, Take 1 tablet (5 mg total) by mouth every 4 (four) hours as needed for moderate pain for up to 10 days Max Daily Amount: 30 mg, Disp: 40 tablet, Rfl: 0    cyclobenzaprine (FLEXERIL) 10 mg tablet, Take 1 tablet (10 mg total) by mouth 3 (three) times a day as needed for muscle spasms (Patient not taking: No sig reported), Disp: 30 tablet, Rfl: 0    senna-docusate sodium (SENOKOT S) 8 6-50 mg per tablet, Take 2 tablets by mouth daily (Patient not taking: No sig reported), Disp: 15 tablet, Rfl: 0      Luciana Muller    I,:  Magda Landaverde am acting as a scribe while in the presence of the attending physician :       I,:  Paul Morse MD personally performed the services described in this documentation    as scribed in my presence :

## 2022-05-25 ENCOUNTER — ANESTHESIA EVENT (OUTPATIENT)
Dept: PERIOP | Facility: AMBULARY SURGERY CENTER | Age: 37
End: 2022-05-25
Payer: COMMERCIAL

## 2022-05-26 ENCOUNTER — ANESTHESIA (OUTPATIENT)
Dept: PERIOP | Facility: AMBULARY SURGERY CENTER | Age: 37
End: 2022-05-26
Payer: COMMERCIAL

## 2022-05-26 ENCOUNTER — TELEPHONE (OUTPATIENT)
Dept: OBGYN CLINIC | Facility: HOSPITAL | Age: 37
End: 2022-05-26

## 2022-05-26 ENCOUNTER — HOSPITAL ENCOUNTER (OUTPATIENT)
Facility: AMBULARY SURGERY CENTER | Age: 37
Setting detail: OUTPATIENT SURGERY
Discharge: HOME | End: 2022-05-26
Attending: ORTHOPAEDIC SURGERY | Admitting: ORTHOPAEDIC SURGERY
Payer: COMMERCIAL

## 2022-05-26 ENCOUNTER — APPOINTMENT (OUTPATIENT)
Dept: RADIOLOGY | Facility: AMBULARY SURGERY CENTER | Age: 37
End: 2022-05-26
Payer: COMMERCIAL

## 2022-05-26 VITALS
HEART RATE: 65 BPM | SYSTOLIC BLOOD PRESSURE: 106 MMHG | HEIGHT: 72 IN | TEMPERATURE: 97.2 F | RESPIRATION RATE: 16 BRPM | OXYGEN SATURATION: 99 % | DIASTOLIC BLOOD PRESSURE: 66 MMHG | BODY MASS INDEX: 21.02 KG/M2

## 2022-05-26 DIAGNOSIS — S89.92XA LEFT KNEE INJURY, INITIAL ENCOUNTER: ICD-10-CM

## 2022-05-26 PROBLEM — S93.325A LISFRANC DISLOCATION, LEFT, INITIAL ENCOUNTER: Status: ACTIVE | Noted: 2022-05-26

## 2022-05-26 LAB
DME PARACHUTE DELIVERY DATE ACTUAL: NORMAL
DME PARACHUTE DELIVERY DATE REQUESTED: NORMAL
DME PARACHUTE DELIVERY NOTE: NORMAL
DME PARACHUTE ITEM DESCRIPTION: NORMAL
DME PARACHUTE ORDER STATUS: NORMAL
DME PARACHUTE SUPPLIER NAME: NORMAL
DME PARACHUTE SUPPLIER PHONE: NORMAL

## 2022-05-26 PROCEDURE — C1713 ANCHOR/SCREW BN/BN,TIS/BN: HCPCS | Performed by: ORTHOPAEDIC SURGERY

## 2022-05-26 PROCEDURE — C1781 MESH (IMPLANTABLE): HCPCS | Performed by: ORTHOPAEDIC SURGERY

## 2022-05-26 PROCEDURE — 28730 FUSION OF FOOT BONES: CPT | Performed by: ORTHOPAEDIC SURGERY

## 2022-05-26 PROCEDURE — C9290 INJ, BUPIVACAINE LIPOSOME: HCPCS | Performed by: ORTHOPAEDIC SURGERY

## 2022-05-26 PROCEDURE — 73620 X-RAY EXAM OF FOOT: CPT

## 2022-05-26 PROCEDURE — 28485 OPTX METATARSAL FX EACH: CPT | Performed by: ORTHOPAEDIC SURGERY

## 2022-05-26 DEVICE — GRAFT BIO4 ORTHO BONE MATRIX 2.5ML-: Type: IMPLANTABLE DEVICE | Site: FOOT | Status: FUNCTIONAL

## 2022-05-26 DEVICE — HEADLESS COMPRESSION SCREW
Type: IMPLANTABLE DEVICE | Site: FOOT | Status: FUNCTIONAL
Brand: FIXOS

## 2022-05-26 DEVICE — LOCKING SCREW
Type: IMPLANTABLE DEVICE | Site: FOOT | Status: FUNCTIONAL
Brand: VARIAX

## 2022-05-26 DEVICE — BONE SCREW
Type: IMPLANTABLE DEVICE | Site: FOOT | Status: FUNCTIONAL
Brand: VARIAX

## 2022-05-26 DEVICE — BROAD STRAIGHT PLATE
Type: IMPLANTABLE DEVICE | Site: FOOT | Status: FUNCTIONAL
Brand: VARIAX

## 2022-05-26 DEVICE — LOCKING SCREW
Type: IMPLANTABLE DEVICE | Status: FUNCTIONAL
Brand: VARIAX

## 2022-05-26 RX ORDER — CHLORHEXIDINE GLUCONATE 0.12 MG/ML
15 RINSE ORAL ONCE
Status: DISCONTINUED | OUTPATIENT
Start: 2022-05-26 | End: 2022-05-26 | Stop reason: HOSPADM

## 2022-05-26 RX ORDER — ACETAMINOPHEN 160 MG/5ML
975 SUSPENSION, ORAL (FINAL DOSE FORM) ORAL ONCE
Status: DISCONTINUED | OUTPATIENT
Start: 2022-05-26 | End: 2022-05-26

## 2022-05-26 RX ORDER — LIDOCAINE HYDROCHLORIDE 20 MG/ML
INJECTION, SOLUTION EPIDURAL; INFILTRATION; INTRACAUDAL; PERINEURAL AS NEEDED
Status: DISCONTINUED | OUTPATIENT
Start: 2022-05-26 | End: 2022-05-26

## 2022-05-26 RX ORDER — SODIUM CHLORIDE, SODIUM LACTATE, POTASSIUM CHLORIDE, CALCIUM CHLORIDE 600; 310; 30; 20 MG/100ML; MG/100ML; MG/100ML; MG/100ML
INJECTION, SOLUTION INTRAVENOUS CONTINUOUS PRN
Status: DISCONTINUED | OUTPATIENT
Start: 2022-05-26 | End: 2022-05-26

## 2022-05-26 RX ORDER — OXYCODONE HYDROCHLORIDE 5 MG/1
5 TABLET ORAL EVERY 4 HOURS PRN
Qty: 40 TABLET | Refills: 0 | Status: SHIPPED | OUTPATIENT
Start: 2022-05-26 | End: 2022-05-31 | Stop reason: SDUPTHER

## 2022-05-26 RX ORDER — BUPIVACAINE HYDROCHLORIDE 2.5 MG/ML
INJECTION, SOLUTION EPIDURAL; INFILTRATION; INTRACAUDAL AS NEEDED
Status: DISCONTINUED | OUTPATIENT
Start: 2022-05-26 | End: 2022-05-26 | Stop reason: HOSPADM

## 2022-05-26 RX ORDER — ONDANSETRON 2 MG/ML
INJECTION INTRAMUSCULAR; INTRAVENOUS AS NEEDED
Status: DISCONTINUED | OUTPATIENT
Start: 2022-05-26 | End: 2022-05-26

## 2022-05-26 RX ORDER — ONDANSETRON 2 MG/ML
4 INJECTION INTRAMUSCULAR; INTRAVENOUS ONCE AS NEEDED
Status: DISCONTINUED | OUTPATIENT
Start: 2022-05-26 | End: 2022-05-26 | Stop reason: HOSPADM

## 2022-05-26 RX ORDER — FENTANYL CITRATE/PF 50 MCG/ML
50 SYRINGE (ML) INJECTION
Status: DISCONTINUED | OUTPATIENT
Start: 2022-05-26 | End: 2022-05-26 | Stop reason: HOSPADM

## 2022-05-26 RX ORDER — MAGNESIUM HYDROXIDE 1200 MG/15ML
LIQUID ORAL AS NEEDED
Status: DISCONTINUED | OUTPATIENT
Start: 2022-05-26 | End: 2022-05-26 | Stop reason: HOSPADM

## 2022-05-26 RX ORDER — DEXAMETHASONE SODIUM PHOSPHATE 10 MG/ML
INJECTION, SOLUTION INTRAMUSCULAR; INTRAVENOUS AS NEEDED
Status: DISCONTINUED | OUTPATIENT
Start: 2022-05-26 | End: 2022-05-26

## 2022-05-26 RX ORDER — OXYCODONE HYDROCHLORIDE 5 MG/1
5 TABLET ORAL ONCE AS NEEDED
Status: COMPLETED | OUTPATIENT
Start: 2022-05-26 | End: 2022-05-26

## 2022-05-26 RX ORDER — CHLORHEXIDINE GLUCONATE 4 G/100ML
SOLUTION TOPICAL DAILY PRN
Status: DISCONTINUED | OUTPATIENT
Start: 2022-05-26 | End: 2022-05-26 | Stop reason: HOSPADM

## 2022-05-26 RX ORDER — VANCOMYCIN HYDROCHLORIDE 1 G/20ML
INJECTION, POWDER, LYOPHILIZED, FOR SOLUTION INTRAVENOUS AS NEEDED
Status: DISCONTINUED | OUTPATIENT
Start: 2022-05-26 | End: 2022-05-26 | Stop reason: HOSPADM

## 2022-05-26 RX ORDER — FENTANYL CITRATE/PF 50 MCG/ML
25 SYRINGE (ML) INJECTION
Status: DISCONTINUED | OUTPATIENT
Start: 2022-05-26 | End: 2022-05-26

## 2022-05-26 RX ORDER — HYDROMORPHONE HCL/PF 1 MG/ML
SYRINGE (ML) INJECTION AS NEEDED
Status: DISCONTINUED | OUTPATIENT
Start: 2022-05-26 | End: 2022-05-26

## 2022-05-26 RX ORDER — PROPOFOL 10 MG/ML
INJECTION, EMULSION INTRAVENOUS AS NEEDED
Status: DISCONTINUED | OUTPATIENT
Start: 2022-05-26 | End: 2022-05-26

## 2022-05-26 RX ORDER — MIDAZOLAM HYDROCHLORIDE 2 MG/2ML
INJECTION, SOLUTION INTRAMUSCULAR; INTRAVENOUS AS NEEDED
Status: DISCONTINUED | OUTPATIENT
Start: 2022-05-26 | End: 2022-05-26

## 2022-05-26 RX ORDER — FENTANYL CITRATE 50 UG/ML
INJECTION, SOLUTION INTRAMUSCULAR; INTRAVENOUS AS NEEDED
Status: DISCONTINUED | OUTPATIENT
Start: 2022-05-26 | End: 2022-05-26

## 2022-05-26 RX ORDER — ACETAMINOPHEN 325 MG/1
975 TABLET ORAL ONCE
Status: COMPLETED | OUTPATIENT
Start: 2022-05-26 | End: 2022-05-26

## 2022-05-26 RX ORDER — CEFAZOLIN SODIUM 1 G/50ML
1000 SOLUTION INTRAVENOUS ONCE
Status: COMPLETED | OUTPATIENT
Start: 2022-05-26 | End: 2022-05-26

## 2022-05-26 RX ORDER — DEXMEDETOMIDINE HYDROCHLORIDE 100 UG/ML
INJECTION, SOLUTION INTRAVENOUS AS NEEDED
Status: DISCONTINUED | OUTPATIENT
Start: 2022-05-26 | End: 2022-05-26

## 2022-05-26 RX ADMIN — FENTANYL CITRATE 25 MCG: 50 INJECTION INTRAMUSCULAR; INTRAVENOUS at 13:01

## 2022-05-26 RX ADMIN — PROPOFOL 100 MG: 10 INJECTION, EMULSION INTRAVENOUS at 11:14

## 2022-05-26 RX ADMIN — FENTANYL CITRATE 50 MCG: 50 INJECTION INTRAMUSCULAR; INTRAVENOUS at 13:20

## 2022-05-26 RX ADMIN — FENTANYL CITRATE 25 MCG: 50 INJECTION INTRAMUSCULAR; INTRAVENOUS at 11:32

## 2022-05-26 RX ADMIN — FENTANYL CITRATE 25 MCG: 50 INJECTION INTRAMUSCULAR; INTRAVENOUS at 11:19

## 2022-05-26 RX ADMIN — DEXAMETHASONE SODIUM PHOSPHATE 10 MG: 10 INJECTION, SOLUTION INTRAMUSCULAR; INTRAVENOUS at 11:13

## 2022-05-26 RX ADMIN — FENTANYL CITRATE 25 MCG: 50 INJECTION INTRAMUSCULAR; INTRAVENOUS at 11:25

## 2022-05-26 RX ADMIN — FENTANYL CITRATE 25 MCG: 50 INJECTION INTRAMUSCULAR; INTRAVENOUS at 13:06

## 2022-05-26 RX ADMIN — DEXMEDETOMIDINE HYDROCHLORIDE 12 MCG: 100 INJECTION, SOLUTION INTRAVENOUS at 11:24

## 2022-05-26 RX ADMIN — ONDANSETRON 4 MG: 2 INJECTION INTRAMUSCULAR; INTRAVENOUS at 11:13

## 2022-05-26 RX ADMIN — HYDROMORPHONE HYDROCHLORIDE 0.5 MG: 1 INJECTION, SOLUTION INTRAMUSCULAR; INTRAVENOUS; SUBCUTANEOUS at 12:02

## 2022-05-26 RX ADMIN — FENTANYL CITRATE 25 MCG: 50 INJECTION INTRAMUSCULAR; INTRAVENOUS at 11:14

## 2022-05-26 RX ADMIN — OXYCODONE HYDROCHLORIDE 5 MG: 5 TABLET ORAL at 13:55

## 2022-05-26 RX ADMIN — SODIUM CHLORIDE, SODIUM LACTATE, POTASSIUM CHLORIDE, AND CALCIUM CHLORIDE: .6; .31; .03; .02 INJECTION, SOLUTION INTRAVENOUS at 12:30

## 2022-05-26 RX ADMIN — SODIUM CHLORIDE, SODIUM LACTATE, POTASSIUM CHLORIDE, AND CALCIUM CHLORIDE: .6; .31; .03; .02 INJECTION, SOLUTION INTRAVENOUS at 10:54

## 2022-05-26 RX ADMIN — ACETAMINOPHEN 975 MG: 325 TABLET, FILM COATED ORAL at 13:58

## 2022-05-26 RX ADMIN — MIDAZOLAM 2 MG: 1 INJECTION INTRAMUSCULAR; INTRAVENOUS at 11:04

## 2022-05-26 RX ADMIN — FENTANYL CITRATE 50 MCG: 50 INJECTION INTRAMUSCULAR; INTRAVENOUS at 13:26

## 2022-05-26 RX ADMIN — FENTANYL CITRATE 25 MCG: 50 INJECTION INTRAMUSCULAR; INTRAVENOUS at 13:12

## 2022-05-26 RX ADMIN — LIDOCAINE HYDROCHLORIDE 100 MG: 20 INJECTION, SOLUTION EPIDURAL; INFILTRATION; INTRACAUDAL at 11:13

## 2022-05-26 RX ADMIN — DEXMEDETOMIDINE HYDROCHLORIDE 12 MCG: 100 INJECTION, SOLUTION INTRAVENOUS at 12:57

## 2022-05-26 RX ADMIN — CEFAZOLIN SODIUM 1000 MG: 1 SOLUTION INTRAVENOUS at 11:04

## 2022-05-26 RX ADMIN — DEXMEDETOMIDINE HYDROCHLORIDE 12 MCG: 100 INJECTION, SOLUTION INTRAVENOUS at 12:04

## 2022-05-26 RX ADMIN — PROPOFOL 200 MG: 10 INJECTION, EMULSION INTRAVENOUS at 11:13

## 2022-05-26 RX ADMIN — DEXMEDETOMIDINE HYDROCHLORIDE 12 MCG: 100 INJECTION, SOLUTION INTRAVENOUS at 11:13

## 2022-05-26 NOTE — TELEPHONE ENCOUNTER
Patient given above information and verbalized understanding thru interpretor  He will take what he has sparingly  I will try to reach out to pharmacy tomorrow to see if they will release the medication sooner

## 2022-05-26 NOTE — OP NOTE
OPERATIVE REPORT  PATIENT NAME: Anselmo Sutton    :  1985  MRN: [id number]  Pt Location: [facility] OR [facility]OM 04    SURGERY DATE: 2022    Surgeon(s) and Role:     * Bradford Stewart MD - Primary  JEANIE Green- assisting  Grzegorz Flaherty MD- resident assisting    Preop Diagnosis:  Closed displaced fracture of first metatarsal bone of left foot, initial encounter [S92 312A]  Lisfranc dislocation, left, initial encounter [S93 325A]    Post-Op Diagnosis Codes:     * Closed displaced fracture of first metatarsal bone of left foot, initial encounter [S92 312A]     * Lisfranc dislocation, left, initial encounter [S93 325A]    Procedure(s) (LRB):  ARTHRODESIS / FUSION FOOT- left Lisfranc arthrodesis and ORIF 1st Metatarsal (Left)    Specimen(s):  * No specimens in log *    Estimated Blood Loss:   Minimal    Drains:  * No LDAs found *    Anesthesia Type:   Choice    Operative Indications:  Closed displaced fracture of first metatarsal bone of left foot, initial encounter [S92 312A]  Lisfranc dislocation, left, initial encounter [S93 325A]      Operative Findings:  Consistent with diagnosis    Complications:   None    Procedure and Technique:  1  Examination under anesthesia, left lisfranc fracture dislocation  2  Arthrodesis 2nd tarsometatarsal joint  3  Open reduction internal fixation of 2nd metatarsal shaft fracture  4  Arthrodesis Intercuneiform joint C1C2  5  Arthrodesis Medial cuneiform to base of 2nd metatarsal  6  Allograft bone grafting arthrodesis sites  7  ORIF 1st metatarsal fracture   8  FLUOROSCOPE EXAM without benefit of Radiologist <1 HR EXTENSIVE   9  Placement into short leg nonweightbearing plaster splint  OPERATIVE REPORT:   The patient was met in the preoperative holding area  Dr Gabrielle Ramires reviewed the procedure with the patient, reviewed the consent form, and marked the correct extremity    The patient was then taken to the operating room and the nursing staff reviewed the patient indentification and operative consent form  The patient was then placed in supine position on the operating room table and a seatbelt was placed  All bony prominences were well padded  After anesthesia had completed anesthetizing the patient, a fluoroscopic stress exam was conducted which demonstrated instability of the C1M2 ligament as well as the 2nd MT bases at the 2nd TMT joints  This confirmed the need for arthrodesis  A prescrub with chlorhexidine and alcohol was performed  The operative lower extremity was then prepped and draped in sterile fashion  A timeout was performed by Dr Caren Eason that identified the correct patient, correct procedure, correct extremity, the fact that antibiotics were given within one hour prior to the incision and that the correct instruments were available to proceed with the case  The operative team introduced themselves if any new members were present  An esmarch was used to exsanguinate the operative leg and used as a tourniquet  See anesthesia documentation for tourniquet time  Attention was turned to operative dorsal midfoot foot and a standard linear incision in line with the lateral border of the 1st TMT was made  Sharp dissection was taken through the skin and bovie electrocautery was used to control hemostasis  Scissor dissection was used for the deep dissection using caution to protect the superficial peroneal nerve branches traversing the surgical field  The interval between EHL and EHB tendons were identified and the deep neurovascular bundle was identified, mobilized and retracted  We immediately encountered the zone of injury and were met with hematoma and could visual the C1M2 joint as the dorsal lisfranc ligament complex had been significantly injured  The 1st TMT joint was not involved and was not unstable  We continued dissection over the cuneiforms and saw dorsal ligamentous injury to the intercuneiform C1C2    We determined that a lisfranc fusion was necessary of the C1M2 joint, C1C2 joint and C2M2 joints  We used a Hintermann distractor to expose the C1C2 and C1M2 joints  We used the wooden handle periosteal elevator to remove the cartilage in these joints  We then irrigated thoroughly and then fenestrated the subchondral plate with a 3 7BW drill bit destroying the subchondral bone to encourage a healing surface  We then placed Gita Bio4 bone graft into the arthrodesis site  Next we did the same thing to the 2nd TMT joint first removing the cartilage then drilling to prepare the bony surface for healing  We placed bone graft into the 2nd TMT joint as well  Next, we pinned the intercuneiform joint and confirmed reduction on fluoroscopy  We then drilled a placed a 4 0mm headless compression screw to stabilize the reduction  We did the same thing to the 2nd TMT joint ensuring no plantar or dorsiflexion  Using the large bone clamp, we open reduced the base of the 2nd metatarsal to the lateral border of the medial cuneiform and inserted a guidewire for a 4 0 headless compression screw through an independent incision  We confirmed the reduction on fluoroscopy and when we were satisfied in all planes, we drilled and then inserted the cannulated screw to hold the reduction  Next we sized a bridging dorsal plate from the middle cuneiform to the 2nd metatarsal and then another compression screw plantarly from the medial cuneiform to the middle cuneiform  We used this plate to span the 2nd metatarsal shaft fracture and performed an ORIF of this using this plate  We confirmed plate position and maintained midfoot reduction on fluoroscopy and then filled the plates with appropriate sized screws  Next, with the medial and middle columns securely fixed, we performed an examination under anesthesia of the 4th and 5th TMT joints  These joints were stable and did not require k-wire pinning      Next we turned our attention to the 1st metatarsal fracture  We extended our dorsal incision medially over the 1st Metatarsal and identified the fracture site  We used a combination of freer and knife to mobilize the fragments and then placed an appropriately sized plate dorsally over the fracture to stabilize it  We confirmed reduction and hardware position on Xray  We were satisfied with the reduction as we were able to get the metatarsal out of flexion  Fluoroscopy was used to confirm alignment  Dr Yarelis Pablo independently reviewed and assessed all fluoroscopic images  The wound was copiously irrigated  Vancomycin powder, 500mg, was used in the wound bed  The tourniquet was released and all bleeding was controlled using the electrocautery  All incisions were closed with a combination of 2-0 vicryl for the deep tissues, 4-0 vicryl for subcutaneous tissue, and 3-0 nylon for the skin  A well padded splint was placed  All toes were pink and warm at the end of the case     I was present for the entire procedure     I was present for the entire procedure    Patient Disposition:  PACU       SIGNATURE: Trudy Lester MD  DATE: May 26, 2022  TIME: 9:37 AM

## 2022-05-26 NOTE — INTERVAL H&P NOTE
H&P reviewed  After examining the patient I find no changes in the patients condition since the H&P had been written  There were no vitals filed for this visit      Plan for left lisfranc arthrodesis and ORIF 1st Metatarsal

## 2022-05-26 NOTE — TELEPHONE ENCOUNTER
Patient called stating he can not  his pain medication due to it being to soon  He has to wait for 2 days to get the prescription filled from today's surgery         # [phone]

## 2022-05-26 NOTE — DISCHARGE INSTRUCTIONS
BONILLA Mckenna  Attending, [address] [place] [address] Office Phone: [phone] ? Fax: [phone]  [facility] Office Phone: [phone] ? QZB:[phone]    : Min Manning) Laurent, [address] [facility]     Surgery Coordinators Rin Ariel: [place], [phone]  Rafael Sees  [phone]  Surgery Coordinator [facility]:  [address], [phone]  www [email or web address]   PRE-OPERATIVE AND POST-OPERATIVE INSTRUCTIONS    General Information:  Typical post operative visits are at the following intervals:  2-3 weeks post surgery, 6 weeks post surgery, 3 months post surgery, 6 months post surgery, and then on a yearly basis  However, this may change based on Dr Nicole Grant recommendation  #1 post-operative rule for foot/ankle surgery:  ONCE YOU ARE OUT OF YOUR CAST AND/OR REMOVABLE BOOT, SWELLING MAY PERSIST FOR MANY MONTHS  YOU MIGHT ALSO EXPERIENCE A BLUISH DISCOLORATION OF YOUR LEG  THIS IS NORMAL AND PART OF THE USUAL POSTOPERATIVE EXPERIENCE  DO NOT WAIT UNTIL YOUR BLOCK WEARS OFF TO TAKE YOUR PAIN MEDICATION  IT TAKES A FEW DOSES OF THE PAIN MEDICATION TO REACH A THERAPEUTIC LEVEL  TAKE A TABLET PROACTIVELY BEFORE YOU HAVE ANY PAIN AND AGAIN 4 HOURS LATER SO WHEN THE BLOCK WEARS OFF, YOU ARE NOT CAUGHT OFF GUARD  SMOKING:  Smoking results in incomplete healing of fractures (broken bones) and joints that my have been fused  Smoking and nicotine also prevents the growth of bone into ankle replacements and bone healing  It also slows the healing of muscles and skin (soft tissue)  Therefore, please do not have surgery if you continue to smoke  We reserve the right to cancel your surgery if we suspect that you are smoking  DO NOT use nicorette gum or other patches    Please find an alternative method to quit smoking before your surgery and do not restart after surgery to allow for healing  THREE RULES:    After surgery you will most likely be given the instructions KEEP YOUR TOES ABOVE YOUR NOSE    This means that you MUST have your feet elevated higher than your heart  Keeping your toes above your nose helps to heal the muscles and skin (soft tissues) by reducing swelling in your leg  This position also helps to prevent infection, and is very important in avoiding deep venous thrombosis (blood clots)  In order to keep the blood circulating in your legs and in order to avoid deep vein   thrombosis (blood clots), we ask patients to GET UP ONCE AN HOUR during the day  This means you should at least cross the room and come back  It does not mean you have to be up for long periods of time  In most cases we will not have people immediately put any weight on their operated part  This is important to prevent loosening of metal or other devices holding the bones together  It also prevents irritation of the soft tissues which can lead to prolonged healing  When we say get up once an hour, please walk, hop or move with an assisted device  This is important! Do not do any excessive walking during the first few days after surgery  Recovering from surgery is a full-time task for the patient  Postoperative care is important to avoid irritating the skin incision, which can lead to infection  Please do not plan activities or go out of town for several weeks after surgery  AFTER YOUR SURGERY:  Bleeding through the bandage almost always occurs  Do not let this alarm you  Simply add more gauze or a towel, call us, and come in for a dressing change  If you think it is excessive, contact us immediately or go to the local emergency room  Do not get the bandage wet  Showering is possible with plastic protectors  Be very careful, as the bathroom can be wet and slippery    If you do get your dressing wet, it should be changed immediately  Please contact us  ONCE YOUR ARE OUT OF YOUR CAST AND/OR REMOVABLE BOOT, SWELLING MAY PERSIST FOR MANY MONTHS  THERE WILL ALSO BE A BLUISH DISCOLORATION OF YOUR LEG FOR MONTHS  THIS IS NORMAL AND PART OF THE USUAL POSTOPERATIVE EXPERIENCE  WEARING COMPRESSION HOSE (ELASTIC STOCKINGS) CAN HELP AVOID SOME OF THIS SWELLING  Ice the area 20 minutes every hour once the nerve block wears off  If you are in a cast or a splint, you may need to leave the ice on longer than 20 minutes in order to feel any benefits  DRESSING:   The purpose of the surgical dressing is to keep your wound and the surgical site protected from the environment  Most dressings contain splints, which help to hold your foot and ankle in a corrected position, and also allow the surgical site to heal properly  If you have a drain in place, this will need to be removed in 1 day after surgery  The time for the drain to be pulled will be written on your discharge instruction sheet  CAST  INSTRUCTIONS:  You may or may not get a cast following surgery  If you do, pay close attention to the following:    After application of a splint or cast, it is very important to elevate your leg for 24 to 72 hours  The injured area should be elevated well above the heart  Remember Toes above your Nose  Rest and elevation greatly reduce pain and speed the healing process by minimizing early swelling      CALL YOUR DOCTORS OFFICE OR VISIT LOCATION EMERGENCY ROOM IF YOU HAVE ANY OF THE FOLLOWING:    Significant increased pain, which may be caused by swelling (Strict elevation will alleviate this)  Numbness and tingling in your hand or foot, which may be caused by too much pressure on the nerves (There is always some numbness after surgery due to nerve blocks)  Burning and stinging, which may be caused by too much pressure on the skin  Excessive swelling below the cast, which may mean the cast is slowing your blood circulation  Loss of active movement of toes, which request an urgent evaluation  Loss of capillary refill  Pinch the tip of toes and dariusz the skin  Release pressure and if the skin does not return pink then call the office immediately  DO NOT GET YOUR CAST WET  Bacteria thrive in moist dark areas  We do not want this  If your cast becomes wet, return to the office and we will apply another one  PAIN AFTER SURGERY:  Narcotic pain medication can and will depress your respiratory system if taken in excess  The goal of pain management with narcotics is to be comfortable not pain free  If you take enough narcotics to be pain free then you run the risk of stopping breathing  If this happens, call 911 immediately! Pain in the heel is often  caused by pressure from the weight of your foot on the bed  Make sure your heel is suspended off the bed by keeping a pillow underneath your calf not your knee  Medications: You will be given narcotic pain medication  Do NOT drive while taking narcotic medications  Medications such as Darvocet, Percocet, Vicoden or Tylenol #3, also contain acetaminophen (Tylenol)  Do not take acetaminophen or Tylenol from home when taking theses medications  When you fill your prescription, you may ask the pharmacist if your pain medication has acetaminophen/Tylenol in it  It is okay to take Tylenol with Oxycontin/Oxycodone  Unless you are allergic to aspirin or currently taking a blood thinner, Dr Selena Austin patients are requested to take one 325 mg aspirin every 12 hours until you are back to walking normally after surgery (This can be up to 6 weeks)  Ecotrin (Enteric-coated aspirin) is more sensitive to the stomach and we recommend purchasing this instead of regular aspirin to minimize the risk of stomach irritation  Narcotic medications commonly cause nausea  Taking them with food will decrease this side effect   If you are having extreme nausea, please contact us for an alternative medication or for something that can be taken with this medication to decrease the nausea  Also, narcotic medications frequently cause constipation  An increase of fiber, fruits and vegetables in your diet may alleviate this problem, or if necessary, you may use an over-the-counter medication such as senekot, colace, or Fibercon for constipation problems  You should resume all medications you were taking prior to the surgery unless otherwise specified  If you had fracture surgery, bony surgery like an osteotomy or fusion, or a surgery that requires bone healing, you are advised to take Vitamin D and Calcium to improve healing potential   Vitamin D3 4000 units/day and Calcium 1200mg/day  These are over the counter medications so please pick them up at the pharmacy when you are picking up your prescriptions  Activity:   Because of your recent foot surgery, your activity level will decrease  You will need to elevate your foot ABOVE the level of your heart for a minimum of four days  The length of time necessary for the swelling to go down, and for your wounds to heal properly depends greatly on your efforts here  Elevation is extremely important to avoid compromising the blood supply to your foot  Remember when your foot is down it will swell, which will increase pain and slow healing  Wiggle your toes frequently if possible  If you go home with a regional block, (a type of anesthesia) the foot and leg will be numb  Think of ways to get into your house and around the house until the block wears off  Keep in mind that it may be a legal issue if you drive while in a cast or splint, especially when the splint is on the right foot  You may call the Department of Motor Vehicles to schedule a road test if you have adaptive equipment applied to your car  The amount of weight you are allowed to bear on your foot will be written on your discharge sheet filled out at the time of surgery   The following is an explanation of the possibilities:     Non-weight bearing: You are to put NO weight whatsoever on your foot  When using crutches or a walker, your foot should not touch the ground, except when you are standing  Then, it may rest on the ground  If you are to be non-weight bearing, and you are not compliant, you could compromise the surgery  Some of our patients have been requesting prescriptions for a roll-a-bout knee scooter  [employer] and other insurances have been denying these claims, and you may either have to rent one or pay out of pocket to purchase one

## 2022-05-26 NOTE — ANESTHESIA POSTPROCEDURE EVALUATION
Post-Op Assessment Note    CV Status:  Stable  Pain Score: 0    Pain management: adequate     Mental Status:  Awake and alert   Hydration Status:  Stable   PONV Controlled:  None   Airway Patency:  Patent and adequate      Post Op Vitals Reviewed: Yes      Staff: CRNA, Anesthesiologist         No complications documented      BP   122/64   Temp   97 8   Pulse  52   Resp   14   SpO2   100%
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
## 2022-05-27 NOTE — TELEPHONE ENCOUNTER
I spoke with pharmacist and explained patient having 2 surgeries in a short period of time and pharmacist allowed a day earlier  Patient will call for  later today

## 2022-05-31 DIAGNOSIS — S82.142A CLOSED FRACTURE OF LEFT TIBIAL PLATEAU, INITIAL ENCOUNTER: ICD-10-CM

## 2022-05-31 RX ORDER — OXYCODONE HYDROCHLORIDE 5 MG/1
5 TABLET ORAL EVERY 6 HOURS PRN
Qty: 25 TABLET | Refills: 0 | Status: SHIPPED | OUTPATIENT
Start: 2022-05-31 | End: 2022-06-10

## 2022-05-31 RX ORDER — OXYCODONE HYDROCHLORIDE 5 MG/1
5 TABLET ORAL EVERY 6 HOURS PRN
Qty: 25 TABLET | Refills: 0 | Status: SHIPPED | OUTPATIENT
Start: 2022-05-31 | End: 2022-05-31 | Stop reason: SDUPTHER

## 2022-05-31 RX ORDER — GABAPENTIN 100 MG/1
100 CAPSULE ORAL 3 TIMES DAILY
Qty: 60 CAPSULE | Refills: 0 | Status: SHIPPED | OUTPATIENT
Start: 2022-05-31 | End: 2022-07-28 | Stop reason: ALTCHOICE

## 2022-05-31 NOTE — TELEPHONE ENCOUNTER
Patient calling in stating he had surgery with Dr Bienvenido Nesbitt and Dr Jenniffer Brewer and he is experiencing a lot of discomfort  He states he does not have any medication  Preferred Pharmacy-[facility] on [address] in [facility]  Please advise       cb # [phone]

## 2022-05-31 NOTE — TELEPHONE ENCOUNTER
Patient calling in stating that Madelin called him and said the medication is not available  Patient is asking for medication to be sent to [facility] on [address] in [facility] # [phone]

## 2022-05-31 NOTE — TELEPHONE ENCOUNTER
I did send gabapentin to the [facility] on [address] as requested  We not refilling the Robaxin at this time

## 2022-05-31 NOTE — TELEPHONE ENCOUNTER
Spoke to patient  He is aware oxycodone is sent in  He stated he is out of gabapentin and robaxin as well  Stated he is getting a lot of pressure in the knee  Reports he cannot see the knee to determine redness or heat to touch because it is wrapped and he was told not to remove the wrapping? Please advise

## 2022-06-15 ENCOUNTER — OFFICE VISIT (OUTPATIENT)
Dept: OBGYN CLINIC | Facility: CLINIC | Age: 37
End: 2022-06-15

## 2022-06-15 VITALS
BODY MASS INDEX: 20.99 KG/M2 | HEIGHT: 72 IN | HEART RATE: 73 BPM | WEIGHT: 155 LBS | DIASTOLIC BLOOD PRESSURE: 70 MMHG | SYSTOLIC BLOOD PRESSURE: 120 MMHG

## 2022-06-15 DIAGNOSIS — S92.312S CLOSED DISPLACED FRACTURE OF FIRST METATARSAL BONE OF LEFT FOOT, SEQUELA: ICD-10-CM

## 2022-06-15 DIAGNOSIS — S93.325A LISFRANC DISLOCATION, LEFT, INITIAL ENCOUNTER: Primary | ICD-10-CM

## 2022-06-15 PROCEDURE — 99024 POSTOP FOLLOW-UP VISIT: CPT | Performed by: ORTHOPAEDIC SURGERY

## 2022-06-15 NOTE — PROGRESS NOTES
BONILLA Owens  Attending, Orthopaedic Surgery  Foot and Ankle  [facility]      ORTHOPAEDIC FOOT AND ANKLE POST-OP VISIT     Procedure:     Left lisfranc arthrodesis and Left 1st metatarsal ORIF       Date of surgery:   5/26/22      PLAN  1  Weightbearing Status- NWB operative extremity in a cast  2  DVT prophylaxis-  BID  3  Continue to elevate 23hrs/day getting up 1x per hour to prevent a blood clot  4  Pain control- OTC pain medication  5  RTC in 3 week(s)  6  Xrays needed next visit - yes Weightbearing Foot    History of Present Illness:   Chief Complaint: s/p above procedure  Last Dewitt is a 39 y o  male who is being seen for post-operative visit for the above procedure  Pain is well controlled and the patient has successfully transitioned to OTC pain medicines  he is taking ASA 325mg BID for DVT prophylaxis  Patient has been NWB in a Splint  Review of Systems:  General- denies fever/chills  Respiratory- denies cough or SOB  Cardio- denies chest pain or palpitations  GI- denies abdominal pain  Musculoskeletal- Negative except noted above  Skin- denies rashes or wounds    Physical Exam:   There were no vitals taken for this visit  General/Constitutional: No apparent distress: well-nourished and well developed  Eyes: normal ocular motion  Lymphatic: No appreciable lymphadenopathy  Respiratory: Non-labored breathing  Vascular: No edema, swelling or tenderness, except as noted in detailed exam   Integumentary: No impressive skin lesions present, except as noted in detailed exam   Neuro: No ataxia or tremors noted  Psych: Normal mood and affect, oriented to person, place and time  Appropriate affect  Musculoskeletal: Normal, except as noted in detailed exam and in HPI      Examination    left        Incision Clean, dry, intact  Sutures Removed this visit    Ecchymosis none    Swelling Mild    Sensation Intact to light touch throughout sural, saphenous, superficial peroneal, deep peroneal and medial/lateral plantar nerve distributions  Snow-Nohemi 5 07 filament (10g) testing deferred  Cardiovascular Brisk capillary refill < 2 seconds,intact DP and PT pulses    Special Tests None      Imaging Studies:   No new imaging        James R Lachman, MD  Foot & Ankle Surgery   Department of [address] [place] [address]      I personally performed the service  Heron Nevin Lachman, MD

## 2022-06-15 NOTE — PATIENT INSTRUCTIONS
Nonweightbearing     Continue aspirin/lovenox to prevent blood clots  Purchase a compression stocking (Knee high, 20-30mm Hg) to be worn at all times while awake for your next visit when the cast comes off  Recommend taking the following supplements: Vitamin D 4000 units per day and Calcium 1200 mg per day  This will help with bone healing  Care of Casts and Splints    Casts and splints support and protect injured bones and soft tissue  When you break a bone, your doctor will put the pieces back together in the right position  Casts and splints hold the bones in place while they heal  They also reduce pain, swelling, and muscle spasm  In some cases, splints and casts are applied following surgery  Splints or "half-casts" provide less support than casts  However, splints can be adjusted to accommodate swelling from injuries easier than enclosed casts  Your doctor will decide which type of support is best for you  Types of Splints and Casts  Casts are custom-made  They must fit the shape of your injured limb correctly to provide the best support  Casts can be made of plaster or fiberglass -- a plastic that can be shaped  Splints or half-casts can also be custom-made, especially if an exact fit is necessary  Other times, a ready-made splint will be used  These off-the-shelf splints are made in a variety of shapes and sizes, and are much easier and faster to use  They have Velcro straps which make the splints easy to put on, take off, and adjust     Materials  Fiberglass or plaster materials form the hard supportive layer in splints and casts  Fiberglass is lighter in weight and stronger than plaster  In addition, x-rays can "see through" fiberglass better than through plaster  This is important because your doctor will probably schedule additional x-rays after your splint or cast has been applied  X-rays can show whether the bones are healing well or have moved out of place    Plaster is less expensive than fiberglass and shapes better than fiberglass for some uses  Application  Both fiberglass and plaster splints and casts use padding, usually cotton, as a protective layer next to the skin  Both materials come in strips or rolls which are dipped in water and applied over the padding covering the injured area  The splint or cast must fit the shape of the injured arm or leg correctly to provide the best possible support  Generally, the splint or cast also covers the joint above and below the broken bone  In many cases, a splint is applied to a fresh injury first  As swelling subsides, a full cast may replace the splint  Sometimes, it may be necessary to replace a cast as swelling goes down and the cast gets "too big " As a fracture heals, the cast may be replaced by a splint to make it easier to perform physical therapy exercises  Apply ice to the splint or cast and elevate your leg to reduce swelling  Warning Signs  Swelling can create a lot of pressure under your cast  This can lead to problems  If you experience any of the following symptoms, contact your doctor's office immediately for advice  Increased pain and the feeling that the splint or cast is too tight  This may be caused by swelling  Numbness and tingling in your hand or foot  This may be caused by too much pressure on the nerves  Burning and stinging  This may be caused by too much pressure on the skin  Excessive swelling below the cast  This may mean the cast is slowing your blood circulation  Loss of active movement of toes or fingers  This requires an urgent evaluation by your doctor  Getting Used to a Splint or Cast  Swelling due to your injury may cause pressure in your splint or cast for the first 48 to 72 hours  This may cause your injured arm or leg to feel snug or tight in the splint or cast  If you have a splint, your doctor will show you how to adjust it to accommodate the swelling    It is very important to keep the swelling down  This will lessen pain and help your injury heal  To help reduce swelling:  Elevate  It is very important to elevate your injured arm or leg for the first 24 to 72 hours  Prop your injured arm or leg up above your heart by putting it on pillows or some other support  You will have to recline if the splint or cast is on your leg  Elevation allows clear fluid and blood to drain "downhill" to your heart  Exercise  Move your uninjured, but swollen fingers or toes gently and often  Moving them often will prevent stiffness  Ice  Apply ice to the splint or cast  Place the ice in a dry plastic bag or ice pack and loosely wrap it around the splint or cast at the level of the injury  Ice that is packed in a rigid container and touches the cast at only one point will not be effective  Taking Care of Your Splint or Cast  Your doctor will explain any restrictions on using your injured arm or leg while it is healing  You must follow your doctor's instructions carefully to make sure your bone heals properly  The following information provides general guidelines only, and is not a substitute for your doctor's advice  After you have adjusted to your splint or cast for a few days, it is important to keep it in good condition  This will help your recovery  Keep your splint or cast dry  Moisture weakens plaster and damp padding next to the skin can cause irritation  Use two layers of plastic or purchase waterproof shields to keep your splint or cast dry while you shower or bathe  Even if the cast is covered, do not submerge it or hold it under running water  A small pinhole in the cast cover can cause the injury to get soaked  Walking casts  Do not walk on a "walking cast" until it is completely dry and hard  It takes about one hour for fiberglass, and two to three days for plaster to become hard enough to walk on  Avoid dirt   Keep dirt, sand, and powder away from the inside of your splint or cast  Padding  Do not pull out the padding from your splint or cast    Itching  Do not stick objects such as coat hangers inside the splint or cast to scratch itching skin  Do not apply powders or deodorants to itching skin  If itching persists, contact your doctor  Trimming  Do not break off rough edges of the cast or trim the cast before asking your doctor  Skin  Inspect the skin around the cast  If your skin becomes red or raw around the cast, contact your doctor  Inspect the cast regularly  If it becomes cracked or develops soft spots, contact your doctor's office  Use common sense  You have a serious injury and you must protect your cast from damage so it can protect your injury while it heals  After the initial swelling has subsided, proper splint or cast support will usually allow you to continue your daily activities with a minimum of inconvenience  Cast Removal  Never remove the cast yourself  You may cut your skin or prevent proper healing of your injury  Your doctor will use a cast saw to remove your cast  The saw vibrates, but does not rotate  If the blade of the saw touches the padding inside the hard shell of the cast, the padding will vibrate with the blade and will protect your skin  Cast saws make noise and may feel "hot" from friction, but will not harm you -- "their bark is worse than their bite "  If you do feel pain while the cast is being removed, let your doctor or an assistant know and they will be able to make adjustments  The saw vibrates but does not rotate  Cast saws make noise but will not harm you

## 2022-06-21 ENCOUNTER — OFFICE VISIT (OUTPATIENT)
Dept: OBGYN CLINIC | Facility: MEDICAL CENTER | Age: 37
End: 2022-06-21

## 2022-06-21 VITALS
BODY MASS INDEX: 20.99 KG/M2 | SYSTOLIC BLOOD PRESSURE: 122 MMHG | HEIGHT: 72 IN | HEART RATE: 76 BPM | DIASTOLIC BLOOD PRESSURE: 65 MMHG | WEIGHT: 155 LBS

## 2022-06-21 DIAGNOSIS — S83.105A LEFT KNEE DISLOCATION, INITIAL ENCOUNTER: Primary | ICD-10-CM

## 2022-06-21 PROCEDURE — 99024 POSTOP FOLLOW-UP VISIT: CPT | Performed by: ORTHOPAEDIC SURGERY

## 2022-06-21 NOTE — PROGRESS NOTES
Orthopaedic Surgery - Office Note  Eric Garcia (30 y o  male)   : 1985   MRN: [id number]  Encounter Date: 2022    Chief Complaint   Patient presents with    Left Knee - Follow-up       Assessment / Plan  S/p L knee ACL reconstruction, PCL reconstruction, posterolateral ligament complex reconstruction, ANIBAL, (DOS: 2022)    · Overall patient is doing well, but has considerable knee stiffness  He has not yet started PT, recommend initiating PT to work on regaining knee motion  He was counseled that if he is not able to regain motion with PT, he may require manipulation under anesthesia in the future  · Activity restriction: per protocol  He may begin weight bearing once cleared by Dr Philippe Morgan  · Begin to wean from hinged knee brace  · Begin outpatient PT, focus on ROM  · Anti-inflammatories or Tylenol prn pain  Return in about 6 weeks (around 2022)  History of Present Illness  Eric Garcia is a 39 y o  male who presents approx 6 weeks post op from multiligament knee reconstruction (ACL, PCL, PLC)  Overall patient is doing well  He reports that his pain has improved overall  Pain is worse with attempted knee ROM  He reports significant stiffness in the knee  Since his last visit he has undergone L midfoot arthrodesis for his lisfranc injury and ORIF of his L 1st metatarsal with Dr Philippe Morgan (DOS: 22)  Review of Systems  Pertinent items are noted in HPI  All other systems were reviewed and are negative  Physical Exam  /65   Pulse 76   Ht 6' (1 829 m)   Wt 70 3 kg (155 lb)   BMI 21 02 kg/m²   Cons: Appears well  No apparent distress  Psych: Alert  Oriented x3  Mood and affect normal   Eyes: PERRLA, EOMI  Resp: Normal effort  No audible wheezing or stridor  CV: Palpable pulse  No discernable arrhythmia  No LE edema  Lymph:  No palpable cervical, axillary, or inguinal lymphadenopathy  Skin: Warm  No palpable masses    No visible lesions  Neuro: Normal muscle tone  Normal and symmetric DTR's  Left Knee Exam  Alignment:  Normal knee alignment  Inspection:  moderate swelling  moderate edema  No erythema  Incisions clean and dry  Eschar over the anterior knee from traumatic injury without erythema or drainage  Palpation:  No tenderness  ROM:  Knee Extension 0  Knee Flexion 15  Strength:  Intact EHL/FHL, unable to perform SLR at this time  Stability:  No objective knee instability  Stable Varus / Valgus stress, Lachman, and Posterior drawer  Tests:  No pertinent positive or negative tests  Patella:  Not tested  Neurovascular: Altered sensation over the lateral leg, otherwise sensation intact distally in DP/SP/Jones/Sa/T nerve distributions  2+ DP & PT pulses  Gait:  Not tested  Studies Reviewed  No studies to review    Procedures  No procedures today  Medical, Surgical, Family, and Social History  The patient's medical history, family history, and social history, were reviewed and updated as appropriate      Past Medical History:   Diagnosis Date    Chronic back pain     Chronic pain disorder        Past Surgical History:   Procedure Laterality Date    APPENDECTOMY      CHOLECYSTECTOMY      KNEE ARTHROSCOPY W/ ACL RECONSTRUCTION Left 2022    Procedure: ARTHROSCOPY KNEE, reconstruction of ACL, PCL, and posterolateral corner;  Surgeon: Anel Park MD;  Location: [facility];  Service: Orthopedics    VA FUSION FOOT BONES,MIDTARSAL,MULTI Left 2022    Procedure: ARTHRODESIS / FUSION FOOT- left Lisfranc arthrodesis and ORIF 1st Metatarsal;  Surgeon: Maulik García MD;  Location: [facility];  Service: Orthopedics       Family History   Problem Relation Age of Onset    Hyperthyroidism Mother     Colon cancer Neg Hx        Social History     Occupational History    Not on file   Tobacco Use    Smoking status: Former Smoker     Quit date: 2022     Years since quittin 1    Smokeless tobacco: Never Used    Tobacco comment: about 2 cigarettes max every couple of days, vape   Vaping Use    Vaping Use: Never used   Substance and Sexual Activity    Alcohol use: Never     Comment: occassional    Drug use: Yes     Frequency: 3 0 times per week     Types: Marijuana     Comment: last used 5/12/2022  - 1800    Sexual activity: Not on file       Allergies   Allergen Reactions    Shellfish-Derived Products - Food Allergy Anaphylaxis and Rash    Aspirin Edema         Current Outpatient Medications:     acetaminophen (TYLENOL) 325 mg tablet, Take 3 tablets (975 mg total) by mouth every 8 (eight) hours, Disp: 30 tablet, Rfl: 0    gabapentin (NEURONTIN) 100 mg capsule, Take 1 capsule (100 mg total) by mouth 3 (three) times a day (Patient not taking: No sig reported), Disp: 60 capsule, Rfl: 0    enoxaparin (enoxaparin) 30 mg/0 3 mL, Inject 0 3 mL (30 mg total) under the skin every 12 (twelve) hours for 28 days (Patient not taking: Reported on 6/15/2022), Disp: 16 8 mL, Rfl: 0    methocarbamol (ROBAXIN) 750 mg tablet, Take 1 tablet (750 mg total) by mouth every 6 (six) hours (Patient not taking: No sig reported), Disp: 45 tablet, Rfl: 0    naloxone (NARCAN) 4 mg/0 1 mL nasal spray, Administer 1 spray into a nostril  If no response after 2-3 minutes, give another dose in the other nostril using a new spray   (Patient not taking: No sig reported), Disp: 1 each, Rfl: 1    ondansetron (ZOFRAN-ODT) 4 mg disintegrating tablet, Take 1 tablet (4 mg total) by mouth every 8 (eight) hours as needed for nausea or vomiting (Patient not taking: No sig reported), Disp: 15 tablet, Rfl: 0    senna-docusate sodium (SENOKOT S) 8 6-50 mg per tablet, Take 2 tablets by mouth daily (Patient not taking: No sig reported), Disp: 15 tablet, Rfl: 0      Pennie Alonso MD    Scribe Attestation    I,:   am acting as a scribe while in the presence of the attending physician :       I,:   personally performed the services described in this documentation    as scribed in my presence :

## 2022-06-29 ENCOUNTER — EVALUATION (OUTPATIENT)
Dept: PHYSICAL THERAPY | Facility: CLINIC | Age: 37
End: 2022-06-29
Payer: COMMERCIAL

## 2022-06-29 DIAGNOSIS — S93.325A LISFRANC DISLOCATION, LEFT, INITIAL ENCOUNTER: Primary | ICD-10-CM

## 2022-06-29 DIAGNOSIS — S83.105A LEFT KNEE DISLOCATION, INITIAL ENCOUNTER: ICD-10-CM

## 2022-06-29 PROCEDURE — 97110 THERAPEUTIC EXERCISES: CPT | Performed by: PHYSICAL THERAPIST

## 2022-06-29 PROCEDURE — 97162 PT EVAL MOD COMPLEX 30 MIN: CPT | Performed by: PHYSICAL THERAPIST
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
## 2022-07-01 ENCOUNTER — OFFICE VISIT (OUTPATIENT)
Dept: PHYSICAL THERAPY | Facility: CLINIC | Age: 37
End: 2022-07-01
Payer: COMMERCIAL

## 2022-07-01 DIAGNOSIS — S83.105A LEFT KNEE DISLOCATION, INITIAL ENCOUNTER: Primary | ICD-10-CM

## 2022-07-01 PROCEDURE — 97112 NEUROMUSCULAR REEDUCATION: CPT

## 2022-07-01 PROCEDURE — 97110 THERAPEUTIC EXERCISES: CPT

## 2022-07-01 PROCEDURE — 97140 MANUAL THERAPY 1/> REGIONS: CPT

## 2022-07-01 NOTE — PROGRESS NOTES
Daily Note     Today's date: 2022  Patient name: Zeynep Luong  : 1985  MRN: [id number]  Referring provider: Kathie Ross MD  Dx:   Encounter Diagnosis     ICD-10-CM    1  Lisfranc dislocation, left, initial encounter  S93 325A    2  Left knee dislocation, initial encounter  S83 105A                   Subjective: Pt presents to PT stating he was able to perform HEP yesterday with less difficulty  He states he feels "better" today and stronger  Objective: See treatment diary below      Assessment:  Pt demonstrates good tolerance to TE today with no complaints  He demonstrates appropriate challenge per protocol  Pt demonstrates limited flexion secondary to pain  Patient demonstrated fatigue post treatment, exhibited good technique with therapeutic exercises and would benefit from continued PT to increase flexibility, strength and function  Plan: Continue per plan of care        Precautions: no weight bearing until cam boot after 22, consider dynasplint    Daily Treatment Diary  Date       Visit Number 1 2      FOTO IE       Re-Eval IE          Manuals    Knee flexion PROM   PK      Patellar mobility   PK                      Neuro Re-Ed     Quad set   5" x 15      Hamstring curl                                                 Ther Ex    Bike   6'      Quad stretch   20" x 3      SLR x3  3 x 10 ea      Heel slides   10" x 10                                      Ther Activity                                    Modalities

## 2022-07-06 ENCOUNTER — TELEPHONE (OUTPATIENT)
Dept: OBGYN CLINIC | Facility: CLINIC | Age: 37
End: 2022-07-06

## 2022-07-06 ENCOUNTER — OFFICE VISIT (OUTPATIENT)
Dept: PHYSICAL THERAPY | Facility: CLINIC | Age: 37
End: 2022-07-06
Payer: COMMERCIAL

## 2022-07-06 DIAGNOSIS — S83.105A LEFT KNEE DISLOCATION, INITIAL ENCOUNTER: Primary | ICD-10-CM

## 2022-07-06 PROCEDURE — 97140 MANUAL THERAPY 1/> REGIONS: CPT

## 2022-07-06 PROCEDURE — 97112 NEUROMUSCULAR REEDUCATION: CPT

## 2022-07-06 PROCEDURE — 97110 THERAPEUTIC EXERCISES: CPT

## 2022-07-06 NOTE — TELEPHONE ENCOUNTER
Patient in agreement for the telephone visit. Med rec complete.   Urinary Symptoms  -Urgency: No  -Frequency: No  -Pain/Burning while urinating: No  -Do you see blood in your urine: No  -Kidney pain: No  -Incontinence: No  -Difficulty trying to urinate: No  -Issues voiding completely: No  -How many times a night do you use the bathroom: 1     Volume related symptoms  -Swelling around your ankles/legs: No  -Swelling in your hands/arms: No  -Difficulty breathing while sitting or laying down: No  -Difficulty breathing while walking: No  -Weight gain: No  -Increased abdominal swelling:No  -Excessive thirst: No  -Do you ever feel dizzy when changing positions: No  -Dry mouth: No     Uremic Related Issues  -Nausea: No  -Vomiting: No  -Decreased appetite: No  -Altered food taste: No  -Unexplained weight loss: No  -Fatigue: No  -Extreme itching:No  -Numbness: No  -Muscle jerking or twitching:No  -Leg spasms at night: No  -Finger/toe spasm or twitching: No  -Mental slowing: No  Confusion: No  -Chest pain: No   Spoke with pt  Pt cast got wet yesterday when it slipped into the shower  Pt tried drying it with hairdryer but states that it still feels wet today  I told him he needs a cast change today and that I set up an appt with Melissa Kelly for today in [place] (not [facility])    He is currently at PT but stated he will go to [place] at [address]

## 2022-07-06 NOTE — TELEPHONE ENCOUNTER
Attempted to reach pt to advise that his cast needs to be changed today if it is very wet  I set up an appt for him with Farzana Chery in [place]  He is to go anytime today but before 6pm       Unable to leave msg  Pt has not setup voicemail

## 2022-07-06 NOTE — PROGRESS NOTES
Daily Note     Today's date: 2022  Patient name: Omi Dunlap  : 1985  MRN: [id number]  Referring provider: John Coyne MD  Dx:   Encounter Diagnosis     ICD-10-CM    1  Lisfranc dislocation, left, initial encounter  S93 325A    2  Left knee dislocation, initial encounter  S83 105A                   Subjective: Pt presents to PT reporting his foot slipped into water and the internal cast is wet  He states he called MD and is waiting for call back  Pt reports difficulty with L knee bending secondary to "pressure" in L distal quad  Objective: See treatment diary below      Assessment: Pt demonstrates good tolerance to progressions with TE  He continues to have difficulty with L knee flexion secondary to pain and tightness in L quad  Educated pt on performing "low load, increased time", to assist with slowly increasing L knee flexion with verbal, tactile and demonstrating cues  Pt reports a verbal understanding  Patient demonstrated fatigue post treatment, exhibited good technique with therapeutic exercises and would benefit from continued PT to increase flexibility, strength and function  Pt AMB in clinic with brace on, non weight bearing with B axillary crutches  Plan: Continue per plan of care        Precautions: no weight bearing until cam boot after 22, consider dynasplint    Daily Treatment Diary  Date      Visit Number 1 2 3     FOTO IE       Re-Eval IE          Manuals    Knee flexion PROM   PK PK     Patellar mobility   PK PK                     Neuro Re-Ed     Quad set   5" x 15 5" x 20     Hamstring curl    5" 2 x 10                                             Ther Ex    Bike   6' 10'     Quad stretch   20" x 3 NV     SLR x3  3 x 10 ea 3 x 10 ea     Heel slides   10" x 10 10" x 10                                     Ther Activity                                    Modalities NAUSEA

## 2022-07-06 NOTE — TELEPHONE ENCOUNTER
Dr Lachman  RE: Wet splint  #: [phone]      Patient had surgery on 5/26/22  He states that his splint had gotten wet last night and is still pretty wet this morning       I did speak with office and they will be giving him a call back

## 2022-07-06 NOTE — TELEPHONE ENCOUNTER
Spoke with pt  Pt cast got wet yesterday when it slipped into the shower  Pt tried drying it with hairdryer but states that it still feels wet today  I told him he needs a cast change today and that I set up an appt with Luis Perez for today in [place] (not [facility])    He is currently at PT but stated he will go to [place] at [address]

## 2022-07-06 NOTE — TELEPHONE ENCOUNTER
Unable to reach pt  Voicemail not setup on pt's phone  Called pt mother, Vickie Gutierrez, and left msg  on voicemail  Pt must be seen today if cast cannot be dried with hairdryer  I setup appt with Farzana Chery in [place]    Pt can go anytime today before 6pm

## 2022-07-06 NOTE — TELEPHONE ENCOUNTER
Left msg on natalie's voicemail for Shruthi Schulte cast change that must be done today if he is stating that his cast is very wet and cannot be dried by hairdryer  I have an appointment setup with Farzana Chery in [place] for today    He can go anytime today but before 6pm 
normal

## 2022-07-08 ENCOUNTER — OFFICE VISIT (OUTPATIENT)
Dept: PHYSICAL THERAPY | Facility: CLINIC | Age: 37
End: 2022-07-08
Payer: COMMERCIAL

## 2022-07-08 DIAGNOSIS — S83.105A LEFT KNEE DISLOCATION, INITIAL ENCOUNTER: Primary | ICD-10-CM

## 2022-07-08 PROCEDURE — 97112 NEUROMUSCULAR REEDUCATION: CPT | Performed by: PHYSICAL THERAPIST

## 2022-07-08 PROCEDURE — 97110 THERAPEUTIC EXERCISES: CPT | Performed by: PHYSICAL THERAPIST

## 2022-07-08 PROCEDURE — 97140 MANUAL THERAPY 1/> REGIONS: CPT | Performed by: PHYSICAL THERAPIST

## 2022-07-08 NOTE — PROGRESS NOTES
Daily Note     Today's date: 2022  Patient name: David Hernandez  : 1985  MRN: [id number]  Referring provider: Eula Saldana MD  Dx:   Encounter Diagnosis     ICD-10-CM    1  Left knee dislocation, initial encounter  S83 105A                   Subjective: Pt reports at this time he is completing his interventions everyday and since last visit had his cast removed due to getting it wet in the shower  He is feeling that when he completes his knee flexion interventions for heel slides he gets stuck       Objective: See treatment diary below      Assessment: He is currently presenting with a hard end feel at approximately 40 degrees of knee flexion during PROM  This may require a manipulation in the future to improve his ROM and tolerance to interventions  He does not have any issues with the strengthening of his hip, and after the cast came off he is negotiating better  I message will be sent to surgeon for sooner follow up depending on progress at next visit  Plan: Continue per plan of care        Precautions: no weight bearing until cam boot after 22, consider dynasplint    Daily Treatment Diary  Date     Visit Number 1 2 3 4    FOTO IE       Re-Eval IE          Manuals    Knee flexion PROM   PK PK [address]    Patellar mobility   PK PK [address]                    Neuro Re-Ed     Quad set   5" x 15 5" x 20     Hamstring curl    5" 2 x 10 5" 2x10                                             Ther Ex    Bike   6' 10' 7'    Quad stretch   20" x 3 NV 20"x3    SLR x3  3 x 10 ea 3 x 10 ea 2x15 # NV    Heel slides   10" x 10 10" x 10 10"x10 w/ manual     Gastroc stretch    20"x3                             Ther Activity                                    Modalities

## 2022-07-11 ENCOUNTER — OFFICE VISIT (OUTPATIENT)
Dept: PHYSICAL THERAPY | Facility: CLINIC | Age: 37
End: 2022-07-11
Payer: COMMERCIAL

## 2022-07-11 DIAGNOSIS — S83.105A LEFT KNEE DISLOCATION, INITIAL ENCOUNTER: Primary | ICD-10-CM

## 2022-07-11 PROCEDURE — 97112 NEUROMUSCULAR REEDUCATION: CPT

## 2022-07-11 PROCEDURE — 97140 MANUAL THERAPY 1/> REGIONS: CPT

## 2022-07-11 PROCEDURE — 97110 THERAPEUTIC EXERCISES: CPT

## 2022-07-11 NOTE — PROGRESS NOTES
Daily Note     Today's date: 2022  Patient name: Renee Giraldo  : 1985  MRN: [id number]  Referring provider: Hayde Small MD  Dx:   Encounter Diagnosis     ICD-10-CM    1  Left knee dislocation, initial encounter  S83 105A                   Subjective: Pt presents to PT reporting fatiuge in L knee at the end of the day  Pt denies pain at this time  Objective: See treatment diary below      Assessment: Pt presents AMB with cam boot, long leg brace, AMB with B axillary crutches  Pt demonstrates good tolerance to progressions with weight  Noted hard end feel remains with L knee flexion with pt reporting "pressure" pointing to L tibial plateau  Patient demonstrated fatigue post treatment, exhibited good technique with therapeutic exercises and would benefit from continued PT to increase flexibility, strength and function  Plan: Continue per plan of care        Precautions: no weight bearing until cam boot after 22, consider dynasplint    Daily Treatment Diary  Date    Visit Number 1 2 3 4 5   FOTO IE    NV   Re-Eval IE          Manuals    Knee flexion PROM   PK PK [address] PK   Patellar mobility   PK PK [address] PK                   Neuro Re-Ed     Quad set   5" x 15 5" x 20  HEP   Hamstring curl    5" 2 x 10 5" 2x10  5" 2x10                                            Ther Ex    Bike   6' 10' 7' 7'   Quad stretch   20" x 3 NV 20"x3 20"x3   SLR x3  3 x 10 ea 3 x 10 ea 2x15 # NV 2# 2 x 15   SLR ext prone     2x 2 x 15   Heel slides   10" x 10 10" x 10 10"x10 w/ manual  10"x10 w/ manual    Gastroc stretch    20"x3  HEP                           Ther Activity                                    Modalities

## 2022-07-13 ENCOUNTER — APPOINTMENT (OUTPATIENT)
Dept: RADIOLOGY | Facility: AMBULARY SURGERY CENTER | Age: 37
End: 2022-07-13
Attending: ORTHOPAEDIC SURGERY
Payer: COMMERCIAL

## 2022-07-13 ENCOUNTER — APPOINTMENT (OUTPATIENT)
Dept: PHYSICAL THERAPY | Facility: CLINIC | Age: 37
End: 2022-07-13
Payer: COMMERCIAL

## 2022-07-13 ENCOUNTER — OFFICE VISIT (OUTPATIENT)
Dept: OBGYN CLINIC | Facility: CLINIC | Age: 37
End: 2022-07-13

## 2022-07-13 ENCOUNTER — OFFICE VISIT (OUTPATIENT)
Dept: PHYSICAL THERAPY | Facility: CLINIC | Age: 37
End: 2022-07-13
Payer: COMMERCIAL

## 2022-07-13 VITALS — BODY MASS INDEX: 20.99 KG/M2 | WEIGHT: 155 LBS | HEIGHT: 72 IN

## 2022-07-13 DIAGNOSIS — S93.325A LISFRANC DISLOCATION, LEFT, INITIAL ENCOUNTER: Primary | ICD-10-CM

## 2022-07-13 DIAGNOSIS — S93.325A LISFRANC DISLOCATION, LEFT, INITIAL ENCOUNTER: ICD-10-CM

## 2022-07-13 DIAGNOSIS — S83.105A LEFT KNEE DISLOCATION, INITIAL ENCOUNTER: Primary | ICD-10-CM

## 2022-07-13 PROCEDURE — 97112 NEUROMUSCULAR REEDUCATION: CPT

## 2022-07-13 PROCEDURE — 73630 X-RAY EXAM OF FOOT: CPT

## 2022-07-13 PROCEDURE — 97110 THERAPEUTIC EXERCISES: CPT

## 2022-07-13 PROCEDURE — 97140 MANUAL THERAPY 1/> REGIONS: CPT

## 2022-07-13 PROCEDURE — 99024 POSTOP FOLLOW-UP VISIT: CPT | Performed by: ORTHOPAEDIC SURGERY

## 2022-07-13 NOTE — PROGRESS NOTES
Daily Note     Today's date: 2022  Patient name: Renee Giraldo  : 1985  MRN: [id number]  Referring provider: Hayde Small MD  Dx:   Encounter Diagnosis     ICD-10-CM    1  Left knee dislocation, initial encounter  S83 105A    2  Lisfranc dislocation, left, initial encounter  S80 65A        Start Time: 809  Stop Time: 905  Total time in clinic (min): 56 minutes  Subjective: Pt reports he feels "really good" today - notes he has f/u /c Dr Kai Fry later this morning + will discuss [address] status at that time  Pt arrives 7' late to apt - able to accommodate  Objective: See treatment diary below  Say Hi marilu used in [address] [place] [address] for translation assistance during today's Tx  Assessment:   Pt challenge /c progression of quad sets to SAQ as well as HS curl to HS curl /c hip ext + wt  Pt has firm end feel during PROM + heel slide /c OP  Added sideling hip flexor stretch to assess quadriceps femoris extensibility -  demonstrates good hip flex flexibility reporting slight (S) in distal quads when performing  Attempted knee flex (S) in sidelaying; however, pt compensates strongly /c femoral IR during stretch, therefore performed supine  Added small bolster to SAQ to work quads through entire arc of available ROM and encourage stretched position  Pt demonstrates appropriate + palpable muscle activation with exercises today  Provided pt education /c use of translation marilu to explain process of manipulation and what rehab looks like afterwards  Pt has fair (L) patellar mobility  Pt would benefit from continued PT  Plan:  Cont /c PT POC  Progress as tolerated        Precautions: no weight bearing until cam boot after 22, consider dynasplint    Daily Treatment Diary  Date   7 7 7   Visit Number 6  3 4 5   FOTO     NV   Re-Eval           Manuals    Knee flexion PROM  SP  PK [address] PK   Patellar mobility  SP  PK [address] PK                   Neuro Re-Ed     [employer] set  5"x20 /c SAQ  5" x 20  HEP   Hamstring curl  /c hip ext  5" 2 x 10 5" 2x10  5" 2x10                                            Ther Ex    Bike  L0 8'   10' 7' 7'   Quad stretch    NV 20"x3 20"x3   SLR x3 3# 30ea  3 x 10 ea 2x15 # NV 2# 2 x 15   SLR ext prone 3# 30x + HS curl    2x 2 x 15   Heel slides  10"x10 /c clinician OP  10" x 10 10"x10 w/ manual  10"x10 w/ manual    Gastroc stretch    20"x3  HEP                           Ther Activity                                    Modalities                        Gato Joel, PTA

## 2022-07-13 NOTE — PROGRESS NOTES
BONILLA Rome  Attending, Orthopaedic Surgery  Foot and Ankle  [facility]      ORTHOPAEDIC FOOT AND ANKLE POST-OP VISIT     Procedure:     Left lisfranc arthrodesis with ORIF of 1st MT fracture       Date of surgery:   5/26/22      PLAN  1  Weightbearing Status- WBAT operative extremity  2  DVT prophylaxis-  BID completed  3  Continue PT  4  Pain control- OTC pain medication  5  RTC in 6 week(s)  6  Xrays needed next visit - yes Weightbearing Foot    History of Present Illness:   Chief Complaint:   Chief Complaint   Patient presents with   2021 Ramesh Blank Celio is a 39 y o  male who is being seen for post-operative visit for the above procedure  Pain is well controlled and the patient has successfully transitioned to OTC pain medicines  he is taking ASA 325mg BID for DVT prophylaxis  Patient has been NWB in a CAM boot  Review of Systems:  General- denies fever/chills  Respiratory- denies cough or SOB  Cardio- denies chest pain or palpitations  GI- denies abdominal pain  Musculoskeletal- Negative except noted above  Skin- denies rashes or wounds    Physical Exam:   Ht 6' (1 829 m)   Wt 70 3 kg (155 lb)   BMI 21 02 kg/m²   General/Constitutional: No apparent distress: well-nourished and well developed  Eyes: normal ocular motion  Lymphatic: No appreciable lymphadenopathy  Respiratory: Non-labored breathing  Vascular: No edema, swelling or tenderness, except as noted in detailed exam   Integumentary: No impressive skin lesions present, except as noted in detailed exam   Neuro: No ataxia or tremors noted  Psych: Normal mood and affect, oriented to person, place and time  Appropriate affect  Musculoskeletal: Normal, except as noted in detailed exam and in HPI  Examination    left        Incision Clean, dry, intact  Sutures Previously removed      Ecchymosis none    Swelling Mild    Sensation Intact to light touch throughout sural, saphenous, superficial peroneal, deep peroneal and medial/lateral plantar nerve distributions  Tingley-Nohemi 5 07 filament (10g) testing deferred  Cardiovascular Brisk capillary refill < 2 seconds,intact DP and PT pulses    Special Tests None      Imaging Studies:   3 views of the left foot were taken, reviewed and interpreted independently that demonstrate hardware in expected position without signs of failure or loosening  Reviewed by me personally  Greggory Dutch Lachman, MD  Foot & Ankle Surgery   Department of [address] [place] [address]      I personally performed the service  Greggory Dutch Lachman, MD

## 2022-07-13 NOTE — PATIENT INSTRUCTIONS
4 days of partial weight bearing 50%  Then, 4 days of partial weight bearing 75%  Then, 4 days of partial weight bearing 90%  Then full weight bearing in the boot and wean your crutches/rolling walker(get rid of crutches 7/26/22)  Then 2 weeks of weightbearing as tolerated in the CAM boot  You may begin weaning your boot and transitioning to a sneaker (8/10)  It is important to do this gradually to avoid aggravating the healing process  8/10, you may come out of the boot into a sneaker for 2 hours  2  8/11, you may come out of the boot into a sneaker for 4 hours,  3  The next day, you may come out of the boot into a sneaker for 6 hours  4  Continue this (adding 2 hours per day) as you tolerate  For example, if you do 6 hours out of the boot into a sneaker and your foot swells more than usual at night and it is difficult to control the discomfort, do not advance to 8 hours the next day, stay at 6 hours until you are able to tolerate it  It is essential to follow this protocol and not modify this  No matter when you begin weaning the boot, it will be difficult and there will be swelling and soreness  If you spend more time than is recommended in the boot, this process becomes longer and more painful  Elevation, Ice and tylenol and staying off of it at night will be important to aide in this transition out of the boot  Swelling and soreness are normal as you begin to do more with the injured leg  May DC aspirin/lovenox, no longer needed  May shower, do not soak in a tub/pool/ocean/etc for another 1 weeks  Continue PT  Scar massage- pea sized amount of lotion, massage into scar for 5 minutes each day  Compression stocking (Knee high, 20-30mm Hg) to be worn at all times while awake  Recommend taking the following supplements: Vitamin D 4000 units per day and Calcium 1200 mg per day  This will help with bone healing

## 2022-07-15 ENCOUNTER — APPOINTMENT (OUTPATIENT)
Dept: PHYSICAL THERAPY | Facility: CLINIC | Age: 37
End: 2022-07-15
Payer: COMMERCIAL

## 2022-07-20 ENCOUNTER — APPOINTMENT (OUTPATIENT)
Dept: PHYSICAL THERAPY | Facility: CLINIC | Age: 37
End: 2022-07-20
Payer: COMMERCIAL

## 2022-07-22 ENCOUNTER — APPOINTMENT (OUTPATIENT)
Dept: PHYSICAL THERAPY | Facility: CLINIC | Age: 37
End: 2022-07-22
Payer: COMMERCIAL

## 2022-07-27 ENCOUNTER — OFFICE VISIT (OUTPATIENT)
Dept: PHYSICAL THERAPY | Facility: CLINIC | Age: 37
End: 2022-07-27
Payer: COMMERCIAL

## 2022-07-27 DIAGNOSIS — S93.325A LISFRANC DISLOCATION, LEFT, INITIAL ENCOUNTER: ICD-10-CM

## 2022-07-27 DIAGNOSIS — S83.105A LEFT KNEE DISLOCATION, INITIAL ENCOUNTER: Primary | ICD-10-CM

## 2022-07-27 PROCEDURE — 97140 MANUAL THERAPY 1/> REGIONS: CPT

## 2022-07-27 PROCEDURE — 97110 THERAPEUTIC EXERCISES: CPT

## 2022-07-27 PROCEDURE — 97112 NEUROMUSCULAR REEDUCATION: CPT

## 2022-07-27 NOTE — PROGRESS NOTES
Daily Note     Today's date: 2022  Patient name: Rosita Gutierrez  : 1985  MRN: [id number]  Referring provider: Misael Valentin MD  Dx:   Encounter Diagnosis     ICD-10-CM    1  Left knee dislocation, initial encounter  S83 105A    2  Lisfranc dislocation, left, initial encounter  S93 325A                   Subjective: Pt presents to PT stating he has about 50 to 70% weight bearing on L LE  He continues to wear boot but has taken the brace off  He states MD is aware of not wearing the brace  Objective: See treatment diary below      Assessment: Educated pt on AMB with bilateral axillary crutches with partial weight bearing on L LE by demonstrating and verbal cues  Pt demonstrates an understanding by performing correctly  Pt continues to have most difficulty with L knee flexion noting hard end feel with PROM  Patient demonstrated fatigue post treatment, exhibited good technique with therapeutic exercises and would benefit from continued PT to increase flexibility, strength and function per protocol  Plan: Continue per plan of care        Precautions: no weight bearing until cam boot after 22, consider dynasplint    Daily Treatment Diary  Date    Visit Number 6    5   FOTO     NV   Re-Eval           Manuals    Knee flexion PROM  SP PK       PK   Patellar mobility  SP PK   PK                   Neuro Re-Ed     Quad set  5"x20 /c SAQ 5"x20 /c SAQ   HEP   Hamstring curl  /c hip ext       5" 2x10    Bio dex weight shifting  50 -70% 3'                                      Ther Ex    Bike  L0 8'  5' L0       7'   Quad stretch      20"x3   SLR x3 3# 30ea 0# 30x   2# 2 x 15   SLR ext prone 3# 30x + HS curl 3# 30x + HS curl   2x 2 x 15   Heel slides  10"x10 /c clinician OP 10"x10 /c clinician OP   10"x10 w/ manual    Gastroc stretch     HEP                           Ther Activity                                    Modalities

## 2022-07-28 ENCOUNTER — OFFICE VISIT (OUTPATIENT)
Dept: FAMILY MEDICINE CLINIC | Facility: CLINIC | Age: 37
End: 2022-07-28

## 2022-07-28 VITALS
WEIGHT: 140.7 LBS | SYSTOLIC BLOOD PRESSURE: 118 MMHG | OXYGEN SATURATION: 98 % | RESPIRATION RATE: 18 BRPM | HEART RATE: 88 BPM | TEMPERATURE: 98 F | HEIGHT: 72 IN | BODY MASS INDEX: 19.06 KG/M2 | DIASTOLIC BLOOD PRESSURE: 50 MMHG

## 2022-07-28 DIAGNOSIS — F17.200 SMOKING: ICD-10-CM

## 2022-07-28 DIAGNOSIS — S82.142A CLOSED FRACTURE OF LEFT TIBIAL PLATEAU, INITIAL ENCOUNTER: ICD-10-CM

## 2022-07-28 DIAGNOSIS — Z00.00 ANNUAL PHYSICAL EXAM: Primary | ICD-10-CM

## 2022-07-28 PROBLEM — R50.9 FEVER: Status: RESOLVED | Noted: 2022-04-30 | Resolved: 2022-07-28

## 2022-07-28 PROBLEM — S80.01XA HEMATOMA OF RIGHT KNEE REGION: Status: RESOLVED | Noted: 2022-04-26 | Resolved: 2022-07-28

## 2022-07-28 PROBLEM — S27.0XXA TRAUMATIC PNEUMOTHORAX: Status: RESOLVED | Noted: 2022-04-24 | Resolved: 2022-07-28

## 2022-07-28 PROCEDURE — 99385 PREV VISIT NEW AGE 18-39: CPT | Performed by: FAMILY MEDICINE

## 2022-07-28 NOTE — PATIENT INSTRUCTIONS
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
## 2022-07-29 ENCOUNTER — TELEPHONE (OUTPATIENT)
Dept: OBGYN CLINIC | Facility: HOSPITAL | Age: 37
End: 2022-07-29

## 2022-07-29 ENCOUNTER — OFFICE VISIT (OUTPATIENT)
Dept: PHYSICAL THERAPY | Facility: CLINIC | Age: 37
End: 2022-07-29
Payer: COMMERCIAL

## 2022-07-29 DIAGNOSIS — S83.105A LEFT KNEE DISLOCATION, INITIAL ENCOUNTER: Primary | ICD-10-CM

## 2022-07-29 DIAGNOSIS — S93.325A LISFRANC DISLOCATION, LEFT, INITIAL ENCOUNTER: ICD-10-CM

## 2022-07-29 PROCEDURE — 97140 MANUAL THERAPY 1/> REGIONS: CPT

## 2022-07-29 PROCEDURE — 97112 NEUROMUSCULAR REEDUCATION: CPT

## 2022-07-29 PROCEDURE — 97110 THERAPEUTIC EXERCISES: CPT

## 2022-07-29 NOTE — TELEPHONE ENCOUNTER
Patient calling because he was under the impression he was going to be seen today by Dr Kian Mendez before his Physical Therapy appointment, but when he came to the office he did not have an appointment   Please advise    CB # [phone] (Needs )

## 2022-07-29 NOTE — PROGRESS NOTES
Daily Note     Today's date: 2022  Patient name: Josephine Gavin  : 1985  MRN: [id number]  Referring provider: Onofre Martinez MD  Dx:   Encounter Diagnosis     ICD-10-CM    1  Left knee dislocation, initial encounter  S83 105A    2  Lisfranc dislocation, left, initial encounter  S93 325A                   Subjective: Pt presents to PT reporting he went for follow up for ortho and states he was told he didn't have an appointment  Appointment is now 8/3/22  Objective: See treatment diary below      Assessment: Tolerated treatment well  Pt AMB with B axillary crutches with partial weight bearing on L LE  Noted mild improvement with L knee flexion measured in supine: 30 degrees of flexion  Pt demonstrates less quad lag while performing SLR today  Patient demonstrated fatigue post treatment, exhibited good technique with therapeutic exercises and would benefit from continued PT per POC  Plan: Continue per plan of care        Precautions: no weight bearing until cam boot after 22, consider dynasplint    Daily Treatment Diary  Date    Visit Number 6    5   FOTO     NV   Re-Eval           Manuals    Knee flexion PROM  SP PK PK      PK   Patellar mobility  SP PK PK  PK                   Neuro Re-Ed     Quad set  5"x20 /c SAQ 5"x20 /c SAQ 5" x 10, then SAQ 5" x 10  HEP   Hamstring curl  /c hip ext  5" 2x10      5" 2x10    Bio dex weight shifting  50 -70% 3' 50 -70% 3'  50 -70% 3' c HH, 3' s HH                                   Ther Ex    Bike  L0 8'  5' L0 5' L0      7'   Quad stretch      20"x3   SLR x3 3# 30ea 0# 30x 0#supine, 3# SL/prone 30x  2# 2 x 15   SLR ext prone 3# 30x + HS curl 3# 30x + HS curl 3# 30x + HS curl  2x 2 x 15   Heel slides  10"x10 /c clinician OP 10"x10 /c clinician OP 10"x10 /c clinician OP  10"x10 w/ manual    Gastroc stretch   HEP  HEP                           Ther Activity                                    Modalities

## 2022-07-29 NOTE — TELEPHONE ENCOUNTER
Called & relayed message to patient  He understands that when follows up in the office, Dr Kelsi Lucas will see how his ROM is doing and what the next step is  Patient expressed his annoyance with whomever had called to confirm an appt today previously  I simply apologized for the misunderstanding  Agreed to come in next week as scheduled
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
## 2022-08-05 ENCOUNTER — OFFICE VISIT (OUTPATIENT)
Dept: OBGYN CLINIC | Facility: MEDICAL CENTER | Age: 37
End: 2022-08-05

## 2022-08-05 VITALS — HEART RATE: 69 BPM | DIASTOLIC BLOOD PRESSURE: 72 MMHG | SYSTOLIC BLOOD PRESSURE: 122 MMHG

## 2022-08-05 DIAGNOSIS — S89.92XD LEFT KNEE INJURY, SUBSEQUENT ENCOUNTER: ICD-10-CM

## 2022-08-05 DIAGNOSIS — M24.662 ARTHROFIBROSIS OF KNEE JOINT, LEFT: ICD-10-CM

## 2022-08-05 DIAGNOSIS — S83.105D LEFT KNEE DISLOCATION, SUBSEQUENT ENCOUNTER: Primary | ICD-10-CM

## 2022-08-05 PROCEDURE — 99024 POSTOP FOLLOW-UP VISIT: CPT | Performed by: ORTHOPAEDIC SURGERY

## 2022-08-05 RX ORDER — CHLORHEXIDINE GLUCONATE 0.12 MG/ML
15 RINSE ORAL ONCE
Status: CANCELLED | OUTPATIENT
Start: 2022-08-11 | End: 2022-08-05

## 2022-08-05 RX ORDER — CEFAZOLIN SODIUM 1 G/50ML
1000 SOLUTION INTRAVENOUS ONCE
Status: CANCELLED | OUTPATIENT
Start: 2022-08-11 | End: 2022-08-05

## 2022-08-05 NOTE — PROGRESS NOTES
Orthopaedic Surgery - Office Note  Jaymie Barfield (46 y o  male)   : 1985   MRN: [id number]  Encounter Date: 2022    Left knee Stiffness    Assessment / Plan  S/p left knee ACL reconstruction, PCL reconstruction, posterolateral ligament complex reconstruction, ANIBAL, (DOS: 2022)    · The diagnosis and treatment options were reviewed  · The patient wishes to proceed with left knee manipulation and possible arthroscopic lysis of adhesions  · The risks, benefits, and alternatives were discussed  · Written consent was obtained  · Return to clinic post op for wound check, Patient will require intensive physical therapy for knee ROM (5 days a week for 2 weeks post op) - script written    History of Present Illness  Jaymie Barfield is a 44 y o  male who presents for follow up now 10 weeks s/p left knee ACL reconstruction, PCL reconstruction, posterolateral ligament complex reconstruction, ANIBAL, (DOS: 2022)  The patient has been working in physical therapy and states that he now is able to bend his knee to 45 deg of flexion since he started therapy  The patient is able to weight bear without issue  Review of Systems  Pertinent items are noted in HPI  All other systems were reviewed and are negative  Physical Exam  /72   Pulse 69   Cons: Appears well  No apparent distress  Psych: Alert  Oriented x3  Mood and affect normal   Eyes: PERRLA, EOMI  Resp: Normal effort  No audible wheezing or stridor  CV: Palpable pulse  No discernable arrhythmia  No LE edema  Lymph:  No palpable cervical, axillary, or inguinal lymphadenopathy  Skin: Warm  No palpable masses  No visible lesions  Neuro: Normal muscle tone  Normal and symmetric DTR's  Left Knee Exam  Alignment:  Normal knee alignment  Inspection:  minimal swelling  Incision healed  Palpation:  No tenderness  ROM:  Knee Extension 5 deg  Knee Flexion 45 deg  Strength:  Not tested    Stability:  No objective knee instability  Stable Varus / Valgus stress, Lachman, and Posterior drawer  Tests:  No pertinent positive or negative tests  Patella:  Not tested  Neurovascular:  Sensation intact in DP/SP/Jones/Sa/T nerve distributions  2+ DP & PT pulses  Gait:  Not tested  Studies Reviewed  No studies to review    Procedures  No procedures today  Medical, Surgical, Family, and Social History  The patient's medical history, family history, and social history, were reviewed and updated as appropriate      Past Medical History:   Diagnosis Date    Chronic back pain     Chronic pain disorder        Past Surgical History:   Procedure Laterality Date    APPENDECTOMY      CHOLECYSTECTOMY      KNEE ARTHROSCOPY W/ ACL RECONSTRUCTION Left 2022    Procedure: ARTHROSCOPY KNEE, reconstruction of ACL, PCL, and posterolateral corner;  Surgeon: Geovanna Bedoya MD;  [facility];  Service: Orthopedics    [place] FUSION FOOT Kamara Breeding Left 2022    Procedure: ARTHRODESIS / Nguyễn Bahman- left Lisfranc arthrodesis and ORIF 1st Metatarsal;  Surgeon: Kayla Leung MD;  [facility];  Service: Orthopedics       Family History   Problem Relation Age of Onset    Hyperthyroidism Mother     Colon cancer Neg Hx        Social History     Occupational History    Not on file   Tobacco Use    Smoking status: Former Smoker     Quit date: 2022     Years since quittin 2    Smokeless tobacco: Never Used    Tobacco comment: about 2 cigarettes max every couple of days, vape   Vaping Use    Vaping Use: Never used   Substance and Sexual Activity    Alcohol use: Never     Comment: occassional    Drug use: Yes     Frequency: 3 0 times per week     Types: Marijuana     Comment: last used 2022  - 18    Sexual activity: Not on file       Allergies   Allergen Reactions    Shellfish-Derived Products - Food Allergy Anaphylaxis and Rash    Aspirin Edema         Current Outpatient Medications:     acetaminophen (TYLENOL) 325 mg tablet, Take 3 tablets (975 mg total) by mouth every 8 (eight) hours (Patient not taking: Reported on 7/13/2022), Disp: 30 tablet, Rfl: 0      Cory Hatfield MD    Scribe Attestation    I,:   am acting as a scribe while in the presence of the attending physician :       I,:   personally performed the services described in this documentation    as scribed in my presence :

## 2022-08-05 NOTE — H&P (VIEW-ONLY)
Orthopaedic Surgery - Office Note  David Hernandez (74 y o  male)   : 1985   MRN: [id number]  Encounter Date: 2022    Left knee Stiffness    Assessment / Plan  S/p left knee ACL reconstruction, PCL reconstruction, posterolateral ligament complex reconstruction, ANIBAL, (DOS: 2022)    · The diagnosis and treatment options were reviewed  · The patient wishes to proceed with left knee manipulation and possible arthroscopic lysis of adhesions  · The risks, benefits, and alternatives were discussed  · Written consent was obtained  · Return to clinic post op for wound check, Patient will require intensive physical therapy for knee ROM (5 days a week for 2 weeks post op) - script written    History of Present Illness  David Hernandez is a 44 y o  male who presents for follow up now 10 weeks s/p left knee ACL reconstruction, PCL reconstruction, posterolateral ligament complex reconstruction, ANIBAL, (DOS: 2022)  The patient has been working in physical therapy and states that he now is able to bend his knee to 45 deg of flexion since he started therapy  The patient is able to weight bear without issue  Review of Systems  Pertinent items are noted in HPI  All other systems were reviewed and are negative  Physical Exam  /72   Pulse 69   Cons: Appears well  No apparent distress  Psych: Alert  Oriented x3  Mood and affect normal   Eyes: PERRLA, EOMI  Resp: Normal effort  No audible wheezing or stridor  CV: Palpable pulse  No discernable arrhythmia  No LE edema  Lymph:  No palpable cervical, axillary, or inguinal lymphadenopathy  Skin: Warm  No palpable masses  No visible lesions  Neuro: Normal muscle tone  Normal and symmetric DTR's  Left Knee Exam  Alignment:  Normal knee alignment  Inspection:  minimal swelling  Incision healed  Palpation:  No tenderness  ROM:  Knee Extension 5 deg  Knee Flexion 45 deg  Strength:  Not tested    Stability:  No objective knee instability  Stable Varus / Valgus stress, Lachman, and Posterior drawer  Tests:  No pertinent positive or negative tests  Patella:  Not tested  Neurovascular:  Sensation intact in DP/SP/Jones/Sa/T nerve distributions  2+ DP & PT pulses  Gait:  Not tested  Studies Reviewed  No studies to review    Procedures  No procedures today  Medical, Surgical, Family, and Social History  The patient's medical history, family history, and social history, were reviewed and updated as appropriate      Past Medical History:   Diagnosis Date    Chronic back pain     Chronic pain disorder        Past Surgical History:   Procedure Laterality Date    APPENDECTOMY      CHOLECYSTECTOMY      KNEE ARTHROSCOPY W/ ACL RECONSTRUCTION Left 2022    Procedure: ARTHROSCOPY KNEE, reconstruction of ACL, PCL, and posterolateral corner;  Surgeon: Jamil Sow MD;  [facility];  Service: Orthopedics    [place] FUSION FOOT Tiny Bodily Left 2022    Procedure: ARTHRODESIS / [place]- left Lisfranc arthrodesis and ORIF 1st Metatarsal;  Surgeon: Chun Pelayo MD;  [facility];  Service: Orthopedics       Family History   Problem Relation Age of Onset    Hyperthyroidism Mother     Colon cancer Neg Hx        Social History     Occupational History    Not on file   Tobacco Use    Smoking status: Former Smoker     Quit date: 2022     Years since quittin 2    Smokeless tobacco: Never Used    Tobacco comment: about 2 cigarettes max every couple of days, vape   Vaping Use    Vaping Use: Never used   Substance and Sexual Activity    Alcohol use: Never     Comment: occassional    Drug use: Yes     Frequency: 3 0 times per week     Types: Marijuana     Comment: last used 2022  - 18    Sexual activity: Not on file       Allergies   Allergen Reactions    Shellfish-Derived Products - Food Allergy Anaphylaxis and Rash    Aspirin Edema         Current Outpatient Medications:     acetaminophen (TYLENOL) 325 mg tablet, Take 3 tablets (975 mg total) by mouth every 8 (eight) hours (Patient not taking: Reported on 7/13/2022), Disp: 30 tablet, Rfl: 0      Sid Hewitt MD    Scribe Attestation    I,:   am acting as a scribe while in the presence of the attending physician :       I,:   personally performed the services described in this documentation    as scribed in my presence :

## 2022-08-09 ENCOUNTER — ANESTHESIA EVENT (OUTPATIENT)
Dept: PERIOP | Facility: HOSPITAL | Age: 37
End: 2022-08-09
Payer: COMMERCIAL

## 2022-08-10 NOTE — PRE-PROCEDURE INSTRUCTIONS
Pre-Surgery Instructions:   Medication Instructions    acetaminophen (TYLENOL) 325 mg tablet Instructed to take per normal schedule including DOS with sips water    Pre op,medications and showering instructions reviewed-Patient has hibiclens  Instructed to avoid all ASA/NSAIDs and OTC Vit/Supp from now until after surgery per anesthesia guidelines  Tylenol ok prn  Pt  Verbalized an understanding of all instructions reviewed and offers no concerns at this time

## 2022-08-11 ENCOUNTER — ANESTHESIA (OUTPATIENT)
Dept: PERIOP | Facility: HOSPITAL | Age: 37
End: 2022-08-11
Payer: COMMERCIAL

## 2022-08-11 ENCOUNTER — HOSPITAL ENCOUNTER (OUTPATIENT)
Dept: RADIOLOGY | Facility: HOSPITAL | Age: 37
Setting detail: OUTPATIENT SURGERY
Discharge: HOME | End: 2022-08-11
Payer: COMMERCIAL

## 2022-08-11 ENCOUNTER — HOSPITAL ENCOUNTER (OUTPATIENT)
Facility: HOSPITAL | Age: 37
Setting detail: OUTPATIENT SURGERY
Discharge: HOME | End: 2022-08-11
Attending: ORTHOPAEDIC SURGERY | Admitting: ORTHOPAEDIC SURGERY
Payer: COMMERCIAL

## 2022-08-11 VITALS
WEIGHT: 145.94 LBS | BODY MASS INDEX: 19.77 KG/M2 | HEART RATE: 56 BPM | RESPIRATION RATE: 16 BRPM | TEMPERATURE: 98.4 F | SYSTOLIC BLOOD PRESSURE: 134 MMHG | OXYGEN SATURATION: 97 % | HEIGHT: 72 IN | DIASTOLIC BLOOD PRESSURE: 67 MMHG

## 2022-08-11 DIAGNOSIS — M24.662: Primary | ICD-10-CM

## 2022-08-11 DIAGNOSIS — S89.92XD LEFT KNEE INJURY, SUBSEQUENT ENCOUNTER: ICD-10-CM

## 2022-08-11 PROCEDURE — 29884 ARTHRS KNEE SURG LYSIS ADS: CPT | Performed by: ORTHOPAEDIC SURGERY

## 2022-08-11 RX ORDER — ONDANSETRON 4 MG/1
4 TABLET, ORALLY DISINTEGRATING ORAL EVERY 8 HOURS PRN
Qty: 15 TABLET | Refills: 0 | Status: SHIPPED | OUTPATIENT
Start: 2022-08-11

## 2022-08-11 RX ORDER — MIDAZOLAM HYDROCHLORIDE 2 MG/2ML
INJECTION, SOLUTION INTRAMUSCULAR; INTRAVENOUS AS NEEDED
Status: DISCONTINUED | OUTPATIENT
Start: 2022-08-11 | End: 2022-08-11

## 2022-08-11 RX ORDER — LIDOCAINE HYDROCHLORIDE AND EPINEPHRINE 10; 10 MG/ML; UG/ML
INJECTION, SOLUTION INFILTRATION; PERINEURAL AS NEEDED
Status: DISCONTINUED | OUTPATIENT
Start: 2022-08-11 | End: 2022-08-11

## 2022-08-11 RX ORDER — ONDANSETRON 2 MG/ML
INJECTION INTRAMUSCULAR; INTRAVENOUS AS NEEDED
Status: DISCONTINUED | OUTPATIENT
Start: 2022-08-11 | End: 2022-08-11

## 2022-08-11 RX ORDER — DEXAMETHASONE SODIUM PHOSPHATE 4 MG/ML
INJECTION, SOLUTION INTRA-ARTICULAR; INTRALESIONAL; INTRAMUSCULAR; INTRAVENOUS; SOFT TISSUE AS NEEDED
Status: DISCONTINUED | OUTPATIENT
Start: 2022-08-11 | End: 2022-08-11

## 2022-08-11 RX ORDER — CHLORHEXIDINE GLUCONATE 0.12 MG/ML
15 RINSE ORAL ONCE
Status: COMPLETED | OUTPATIENT
Start: 2022-08-11 | End: 2022-08-11

## 2022-08-11 RX ORDER — FENTANYL CITRATE/PF 50 MCG/ML
25 SYRINGE (ML) INJECTION
Status: DISCONTINUED | OUTPATIENT
Start: 2022-08-11 | End: 2022-08-11 | Stop reason: HOSPADM

## 2022-08-11 RX ORDER — OXYCODONE HYDROCHLORIDE 5 MG/1
10 TABLET ORAL EVERY 4 HOURS PRN
Status: DISCONTINUED | OUTPATIENT
Start: 2022-08-11 | End: 2022-08-11 | Stop reason: HOSPADM

## 2022-08-11 RX ORDER — ROPIVACAINE HYDROCHLORIDE 5 MG/ML
INJECTION, SOLUTION EPIDURAL; INFILTRATION; PERINEURAL
Status: COMPLETED | OUTPATIENT
Start: 2022-08-11 | End: 2022-08-11

## 2022-08-11 RX ORDER — LIDOCAINE HYDROCHLORIDE 20 MG/ML
INJECTION, SOLUTION EPIDURAL; INFILTRATION; INTRACAUDAL; PERINEURAL AS NEEDED
Status: DISCONTINUED | OUTPATIENT
Start: 2022-08-11 | End: 2022-08-11

## 2022-08-11 RX ORDER — NAPROXEN 500 MG/1
500 TABLET ORAL 2 TIMES DAILY WITH MEALS
Qty: 60 TABLET | Refills: 0 | Status: SHIPPED | OUTPATIENT
Start: 2022-08-11

## 2022-08-11 RX ORDER — OXYCODONE HYDROCHLORIDE 5 MG/1
5 TABLET ORAL EVERY 4 HOURS PRN
Status: DISCONTINUED | OUTPATIENT
Start: 2022-08-11 | End: 2022-08-11 | Stop reason: HOSPADM

## 2022-08-11 RX ORDER — PROPOFOL 10 MG/ML
INJECTION, EMULSION INTRAVENOUS AS NEEDED
Status: DISCONTINUED | OUTPATIENT
Start: 2022-08-11 | End: 2022-08-11

## 2022-08-11 RX ORDER — FENTANYL CITRATE 50 UG/ML
INJECTION, SOLUTION INTRAMUSCULAR; INTRAVENOUS AS NEEDED
Status: DISCONTINUED | OUTPATIENT
Start: 2022-08-11 | End: 2022-08-11

## 2022-08-11 RX ORDER — HYDROMORPHONE HCL/PF 1 MG/ML
SYRINGE (ML) INJECTION AS NEEDED
Status: DISCONTINUED | OUTPATIENT
Start: 2022-08-11 | End: 2022-08-11

## 2022-08-11 RX ORDER — ONDANSETRON 2 MG/ML
4 INJECTION INTRAMUSCULAR; INTRAVENOUS ONCE AS NEEDED
Status: DISCONTINUED | OUTPATIENT
Start: 2022-08-11 | End: 2022-08-11 | Stop reason: HOSPADM

## 2022-08-11 RX ORDER — SODIUM CHLORIDE, SODIUM LACTATE, POTASSIUM CHLORIDE, CALCIUM CHLORIDE 600; 310; 30; 20 MG/100ML; MG/100ML; MG/100ML; MG/100ML
125 INJECTION, SOLUTION INTRAVENOUS CONTINUOUS
Status: DISCONTINUED | OUTPATIENT
Start: 2022-08-11 | End: 2022-08-11 | Stop reason: HOSPADM

## 2022-08-11 RX ORDER — CEFAZOLIN SODIUM 1 G/50ML
1000 SOLUTION INTRAVENOUS ONCE
Status: COMPLETED | OUTPATIENT
Start: 2022-08-11 | End: 2022-08-11

## 2022-08-11 RX ORDER — OXYCODONE HYDROCHLORIDE 5 MG/1
5 TABLET ORAL EVERY 4 HOURS PRN
Qty: 30 TABLET | Refills: 0 | Status: SHIPPED | OUTPATIENT
Start: 2022-08-11 | End: 2022-08-21

## 2022-08-11 RX ORDER — MEPERIDINE HYDROCHLORIDE 25 MG/ML
12.5 INJECTION INTRAMUSCULAR; INTRAVENOUS; SUBCUTANEOUS
Status: DISCONTINUED | OUTPATIENT
Start: 2022-08-11 | End: 2022-08-11 | Stop reason: HOSPADM

## 2022-08-11 RX ADMIN — HYDROMORPHONE HYDROCHLORIDE 0.5 MG: 1 INJECTION, SOLUTION INTRAMUSCULAR; INTRAVENOUS; SUBCUTANEOUS at 10:24

## 2022-08-11 RX ADMIN — ONDANSETRON 4 MG: 2 INJECTION INTRAMUSCULAR; INTRAVENOUS at 09:50

## 2022-08-11 RX ADMIN — HYDROMORPHONE HYDROCHLORIDE 0.5 MG: 1 INJECTION, SOLUTION INTRAMUSCULAR; INTRAVENOUS; SUBCUTANEOUS at 10:26

## 2022-08-11 RX ADMIN — FENTANYL CITRATE 100 MCG: 50 INJECTION INTRAMUSCULAR; INTRAVENOUS at 09:02

## 2022-08-11 RX ADMIN — CEFAZOLIN SODIUM 1000 MG: 1 SOLUTION INTRAVENOUS at 09:20

## 2022-08-11 RX ADMIN — FENTANYL CITRATE 50 MCG: 50 INJECTION INTRAMUSCULAR; INTRAVENOUS at 09:38

## 2022-08-11 RX ADMIN — OXYCODONE 5 MG: 5 TABLET ORAL at 12:32

## 2022-08-11 RX ADMIN — PROPOFOL 200 MG: 10 INJECTION, EMULSION INTRAVENOUS at 09:15

## 2022-08-11 RX ADMIN — FENTANYL CITRATE 25 MCG: 50 INJECTION, SOLUTION INTRAMUSCULAR; INTRAVENOUS at 11:11

## 2022-08-11 RX ADMIN — PROPOFOL 50 MG: 10 INJECTION, EMULSION INTRAVENOUS at 10:27

## 2022-08-11 RX ADMIN — CHLORHEXIDINE GLUCONATE 0.12% ORAL RINSE 15 ML: 1.2 LIQUID ORAL at 08:07

## 2022-08-11 RX ADMIN — FENTANYL CITRATE 50 MCG: 50 INJECTION INTRAMUSCULAR; INTRAVENOUS at 10:32

## 2022-08-11 RX ADMIN — SODIUM CHLORIDE, SODIUM LACTATE, POTASSIUM CHLORIDE, AND CALCIUM CHLORIDE 125 ML/HR: .6; .31; .03; .02 INJECTION, SOLUTION INTRAVENOUS at 08:19

## 2022-08-11 RX ADMIN — LIDOCAINE HYDROCHLORIDE 60 MG: 20 INJECTION, SOLUTION EPIDURAL; INFILTRATION; INTRACAUDAL; PERINEURAL at 09:15

## 2022-08-11 RX ADMIN — MIDAZOLAM 4 MG: 1 INJECTION INTRAMUSCULAR; INTRAVENOUS at 09:02

## 2022-08-11 RX ADMIN — SODIUM CHLORIDE, SODIUM LACTATE, POTASSIUM CHLORIDE, AND CALCIUM CHLORIDE: .6; .31; .03; .02 INJECTION, SOLUTION INTRAVENOUS at 09:44

## 2022-08-11 RX ADMIN — SODIUM CHLORIDE, SODIUM LACTATE, POTASSIUM CHLORIDE, AND CALCIUM CHLORIDE 125 ML/HR: .6; .31; .03; .02 INJECTION, SOLUTION INTRAVENOUS at 11:14

## 2022-08-11 RX ADMIN — DEXAMETHASONE SODIUM PHOSPHATE 6 MG: 4 INJECTION INTRA-ARTICULAR; INTRALESIONAL; INTRAMUSCULAR; INTRAVENOUS; SOFT TISSUE at 09:40

## 2022-08-11 RX ADMIN — ROPIVACAINE HYDROCHLORIDE 20 ML: 5 INJECTION, SOLUTION EPIDURAL; INFILTRATION; PERINEURAL at 09:05

## 2022-08-11 RX ADMIN — LIDOCAINE HYDROCHLORIDE,EPINEPHRINE BITARTRATE 10 ML: 10; .01 INJECTION, SOLUTION INFILTRATION; PERINEURAL at 09:05

## 2022-08-11 RX ADMIN — PROPOFOL 100 MG: 10 INJECTION, EMULSION INTRAVENOUS at 10:21

## 2022-08-11 RX ADMIN — FENTANYL CITRATE 50 MCG: 50 INJECTION INTRAMUSCULAR; INTRAVENOUS at 09:52

## 2022-08-11 NOTE — ANESTHESIA PREPROCEDURE EVALUATION
Procedure:  MANIPULATION JOINT KNEE, possible arthrocopic lysis of adhesions (Left Knee)    Relevant Problems   CARDIO   (+) Chronic midline thoracic back pain      HEMATOLOGY   (+) Acute blood loss anemia      MUSCULOSKELETAL   (+) Acute bilateral low back pain without sciatica   (+) Chronic midline thoracic back pain      NEURO/PSYCH   (+) Chronic midline thoracic back pain      PULMONARY   (+) Smoking        Physical Exam    Airway    Mallampati score: II  TM Distance: >3 FB  Neck ROM: full     Dental   No notable dental hx     Cardiovascular  Rhythm: regular, Rate: normal, Cardiovascular exam normal    Pulmonary  Pulmonary exam normal Breath sounds clear to auscultation,     Other Findings        Anesthesia Plan  ASA Score- 2     Anesthesia Type- general with ASA Monitors  Additional Monitors:   Airway Plan: LMA  Plan Factors-Exercise tolerance (METS): >4 METS  Chart reviewed  EKG reviewed  Existing labs reviewed  Patient summary reviewed  Patient is not a current smoker  Patient not instructed to abstain from smoking on day of procedure  Patient did not smoke on day of surgery  There is medical exclusion for perioperative obstructive sleep apnea risk education  Induction- intravenous  Postoperative Plan- Plan for postoperative opioid use  Informed Consent- Anesthetic plan and risks discussed with patient

## 2022-08-11 NOTE — ANESTHESIA POSTPROCEDURE EVALUATION
Post-Op Assessment Note    CV Status:  Stable  Pain Score: 3    Pain management: adequate     Mental Status:  Alert and awake   Hydration Status:  Euvolemic   PONV Controlled:  Controlled   Airway Patency:  Patent   Two or more mitigation strategies used for obstructive sleep apnea   Post Op Vitals Reviewed: Yes      Staff: Anesthesiologist, CRNA         No complications documented      /77 (08/11/22 1108)    Temp      Pulse 76 (08/11/22 1108)   Resp 13 (08/11/22 1108)    SpO2 100 % (08/11/22 1108)

## 2022-08-11 NOTE — DISCHARGE INSTRUCTIONS
POSTOPERATIVE INSTRUCTIONS following KNEE SURGERY    MEDICATIONS:  Resume all home medications unless otherwise instructed by your surgeon  Pain Medication:  Oxycodone 5 mg, 1-3 tablets every 3 hours as needed  If you were given a regional anesthetic (nerve block), please begin taking the pain medication as soon as you get home, even if you have minimal or no pain  DO NOT WAIT FOR THE NERVE BLOCK TO WEAR OFF  Possible side effects include nausea, constipation, and urinary retention  If you experience these side effects, please call our office for assistance  Pain med refills are authorized only during office hours (8am-4pm, Mon-Fri)  Anti-Inflammatory:  Naproxen 500 mg, 1 tablet every 12 hours for 4 weeks  Take with food  Stop if you experience nausea, reflux, or stomach pain  Nausea Medication:  Zofran ODT 4 mg, 1 tablet every 6 hours as needed  Fill prescription ONLY if you expericnce severe nausea  Blood Clot Prevention:  Not Necessary  Pump your foot up and down 20 times per hour while you are less mobile  WOUND CARE:  Keep the dressing clean and dry  Light drainage may occur the first 2 days postop  You may remove the dressings and get the incision wet in the shower 72 hours after surgery  DO NOT remove steri-strips or sutures  DO NOT immerse the incision under water  Carefully pat the incision dry  If there is wound drainage, re-apply a fresh dry gauze dressing  Please call our office ([phone]) if you experience either of the following:  Sudden increase in swelling, redness, or warmth at the surgical site  Excessive incisional drainage that persists beyond the 3rd day after surgery  Oral temperature greater than 101 degrees, not relieved with Tylenol  Shortness of breath, chest pain, nausea, or any other concerning symptoms    SWELLING CONTROL:  Cold Therapy: The cold therapy device may be used either continuously or only as needed, according to your preference    Do not let the pad directly touch your skin  Alternatively, apply ice (20 min on, 20 min off) as often as you feel is necessary  Elevation:  Elevate the entire leg above heart level  Place pillows under your ankle to keep your knee straight  Compression:  Apply ACE wraps or a thigh-length compression stocking as needed  RANGE OF MOTION:  You are allowed FULL RANGE OF MOTION as tolerated  IMMOBILIZATION:  None  You are allowed full range of motion as tolerated  ACTIVITY:   BEAR FULL WEIGHT AS TOLERATED on the operative leg  Use crutches to assist only as needed  Using Crutches on Stairs:  Going up, lead with your "good" (nonoperative) leg  Going down, lead with your "bad" (operative) leg  Use a hand rail when available  Knee Extension:  Place a rolled towel or pillow under your ankle for 20-30 minutes 3-5 times per day  This will help to maintain full knee extension  Quad Sets:  Sit or lie with your knee straight  Tighten your quadriceps (front thigh) muscle  Hold for 3 seconds, then relax  Repeat 20 times per hour while awake  PHYSICAL THERAPY:  Begin therapy on THE DAY AFTER SURGERY  You were given a prescription for therapy at your preoperative office visit  If you do not have physical therapy scheduled yet, please call our office for assistance  FOLLOW-UP APPOINTMENT:  4-5 days after surgery with:    Dr Alissa Mercer, [address] [place] [address] [facility]  [address] [place] [address], [facility] Jihan Garcia, Steffi, [address] [facility]  [phone] ([facility])  [phone] (After-Hours)

## 2022-08-11 NOTE — OP NOTE
OPERATIVE REPORT    PATIENT NAME: Zeynep Luong   :  1985  MRN: [id number]  Pt Location: [facility] OR ROOM 01    SURGERY DATE: 2022    SURGEON(S) and ROLE:  Primary: Mark Goodpasture, MD  Assisting: Juarez Cope MD    NOTE:  I was present for the entire procedure and performed all essential portions of the surgery  PREOPERATIVE DIAGNOSES:  Left Knee  Arthrofibrosis    POSTOPERATIVE DIAGNOSES:  Left Knee  Arthrofibrosis    PROCEDURES:  Left Knee Arthroscopy with:  Lysis of Adhesions  Manipulation Under Anesthesia      ANESTHESIA TYPE:  General LMA and Intra-articular block    ANESTHESIA STAFF:   Anesthesiologist: Danny Khalil DO  CRNA: Macy Muniz CRNA    ESTIMATED BLOOD LOSS:  5 mL    TOURNIQUET TIME:  Not used    PERIOPERATIVE ANTIBIOTICS:  cefazolin, 2 grams    IMPLANTS:  none    * No implants in log *    SPECIMENS:  * No specimens in log *    DRAINS:  None      OPERATIVE INDICATIONS:  The patient is a 44 y o  male with left knee arthrofibrosis following multiligament reconstruction in May 2022  Surgical treatment was indicated due to persistent symptoms despite non-surgical treatment  After a thorough discussion of the potential risks, benefits, and alternative treatments, the patient agreed to proceed with surgery  The patient understands that the risks of surgery include, but are not limited to: infection, neurovascular injury, wound healing complications, venous thromboembolism, persistent pain, stiffness, instability, recurrence of symptoms, potential need for additional surgeries, and loss of limb or life  Oral and written consent for surgery was obtained from the patient preoperatively        EXAM UNDER ANESTHESIA:  Neutral alignment  ROM: 0-40 degrees preoperatively  ROM: 0-115 deg with gravity and 0-135 deg with gentle pressure postoperatively  Ligaments stable to varus stress / valgus stress / Lachman / posterior drawer; negative pivot-shift  Patella tracking normal  without crepitus  PROCEDURE AND TECHNIQUE:  On the day of surgery, the patient was identified in the preoperative holding area  The operative site was marked by the surgeon  The patient was taken into the operating room  A time-out was conducted to confirm the patient's identity, the operative site, and the proposed procedure  The patient was anesthetized, and perioperative antibiotics were administered  The patient was positioned supine on the OR table  All bony prominences were padded  A tourniquet was not used  The operative site was prepped and draped using standard sterile technique  An anterolateral portal was established, and the arthroscope was inserted into the knee joint  An anteromedial portal was established under direct visualization, and diagnostic arthroscopy was performed  The joint demonstrated extensive scarring in the suprapatellar pouch, medial and lateral gutters, and infrapatellar recess  The scar was systematically removed with a motorized shaver and cautery from these locations  In the patellofemoral compartment, the trochlea demonstrated pristine articular cartilage  The patella demonstrated pristine articular cartilage  The patella tracking was normal        In the medial compartment, the medial femoral condyle demonstrated pristine articular cartilage  The medial tibial plateau demonstrated pristine articular cartilage  The medial meniscus was intact       In the lateral compartment, the lateral femoral condyle demonstrated pristine articular cartilage  The lateral tibial plateau demonstrated pristine articular cartilage  The lateral meniscus was intact       In the intercondylar notch, the PCL was intact  The ACL was intact  At the conclusion of the procedure, the instruments were withdrawn  The portals and incisions were closed with absorbable sutures and steri-strips  A sterile dressing was applied  The surgical drapes were removed  A soft bandage was applied to the operative knee  The patient was awakened from anesthesia and transported to the PACU in stable condition        COMPLICATIONS:  None    PATIENT DISPOSITION:  PACU       SIGNATURE:  Kristen Camara MD  DATE:  August 11, 2022  TIME:  1:44 PM

## 2022-08-11 NOTE — ANESTHESIA PROCEDURE NOTES
Peripheral Block    Patient location during procedure: holding area  Start time: 8/11/2022 9:01 AM  Reason for block: at surgeon's request and post-op pain management  Staffing  Performed: Anesthesiologist   Anesthesiologist: Gio Reese DO  Preanesthetic Checklist  Completed: patient identified, IV checked, site marked, risks and benefits discussed, surgical consent, monitors and equipment checked, pre-op evaluation and timeout performed  Peripheral Block  Patient position: supine  Prep: ChloraPrep  Patient monitoring: heart rate, continuous pulse ox and frequent blood pressure checks  Block type: Intra-articular  Laterality: left  Injection technique: single-shotropivacaine (NAROPIN) 0 5 % - Infiltration   20 mL - 8/11/2022 9:05:00 AM (Unable to infiltrate entire dose of LA due to pressure in knee)  Needle  Needle type: short-bevel   Needle gauge: 18 G    Needle localization: anatomical landmarks  Assessment  Injection assessment: incremental injection and negative aspiration for heme  Heart rate change: no  Slow fractionated injection: yes  Post-procedure:  site cleaned  patient tolerated the procedure well with no immediate complications

## 2022-08-12 ENCOUNTER — OFFICE VISIT (OUTPATIENT)
Dept: PHYSICAL THERAPY | Facility: CLINIC | Age: 37
End: 2022-08-12
Payer: COMMERCIAL

## 2022-08-12 DIAGNOSIS — S93.325A LISFRANC DISLOCATION, LEFT, INITIAL ENCOUNTER: ICD-10-CM

## 2022-08-12 DIAGNOSIS — S83.105A LEFT KNEE DISLOCATION, INITIAL ENCOUNTER: Primary | ICD-10-CM

## 2022-08-12 PROCEDURE — 97110 THERAPEUTIC EXERCISES: CPT | Performed by: PHYSICAL THERAPIST

## 2022-08-12 PROCEDURE — 97140 MANUAL THERAPY 1/> REGIONS: CPT | Performed by: PHYSICAL THERAPIST

## 2022-08-12 NOTE — PROGRESS NOTES
Daily Note     Today's date: 2022  Patient name: Kyara Chen  : 1985  MRN: [id number]  Referring provider: Markus Escobedo MD  Dx:   Encounter Diagnosis     ICD-10-CM    1  Left knee dislocation, initial encounter  S83 105A    2  Lisfranc dislocation, left, initial encounter  S93 325A                   Subjective: Pt returns to therapy after 1 day post manipulation  He states that he had to have a minor incision on the anterior aspect of the knee and has not bent his knee or completed any interventions since yesterday  He is fearful of injury and has not been placing any weight in LE and continues to use 2 crutches  During manipulation, surgeon noted 0-135 degrees of motion post manipulation  Objective: See treatment diary below      Assessment: Tolerated treatment well  Patient did experience increase in pain during active range of motion of the knee to approximately 55 degrees and by the end of the session 65 degrees with PROM  He was educated on use of one crutch due to noting of FWBing in chart and how to progress at home  He will continue to complete his heel sliding intervention at home and weight shifting  He requires to continue as much as tolerated  Plan: Continue per plan of care   5x/week next week     Precautions:     Daily Treatment Diary  Date     Visit Number 6 7 8 9    FOTO        Re-Eval           Manuals    Knee flexion PROM  SP PK PK   MK      Patellar mobility  SP PK PK                     Neuro Re-Ed     Quad set  5"x20 /c SAQ 5"x20 /c SAQ 5" x 10, then SAQ 5" x 10     Hamstring curl  /c hip ext  5" 2x10         Bio dex weight shifting  50 -70% 3' 50 -70% 3' Scale 75% 5x3"                                     Ther Ex    Bike  L0 8'  5' L0 5' L0   5' and 5' post       Quad stretch         SLR x3 3# 30ea 0# 30x 0#supine, 3# SL/prone 30x     SLR ext prone 3# 30x + HS curl 3# 30x + HS curl 3# 30x + HS curl     Heel slides  10"x10 /c clinician OP 10"x10 /c clinician OP 10"x10 /c clinician OP     Gastroc stretch   HEP     Quad stretch    NV?                     Ther Activity                                    Modalities

## 2022-08-15 ENCOUNTER — OFFICE VISIT (OUTPATIENT)
Dept: PHYSICAL THERAPY | Facility: CLINIC | Age: 37
End: 2022-08-15
Payer: COMMERCIAL

## 2022-08-15 DIAGNOSIS — S93.325A LISFRANC DISLOCATION, LEFT, INITIAL ENCOUNTER: ICD-10-CM

## 2022-08-15 DIAGNOSIS — S83.105A LEFT KNEE DISLOCATION, INITIAL ENCOUNTER: Primary | ICD-10-CM

## 2022-08-15 PROCEDURE — 97140 MANUAL THERAPY 1/> REGIONS: CPT | Performed by: PHYSICAL THERAPIST

## 2022-08-15 PROCEDURE — 97112 NEUROMUSCULAR REEDUCATION: CPT | Performed by: PHYSICAL THERAPIST

## 2022-08-15 PROCEDURE — 97110 THERAPEUTIC EXERCISES: CPT | Performed by: PHYSICAL THERAPIST

## 2022-08-15 NOTE — PROGRESS NOTES
Daily Note     Today's date: 8/15/2022  Patient name: Oneil Ortiz  : 1985  MRN: [id number]  Referring provider: Marcellus Robles MD  Dx:   Encounter Diagnosis     ICD-10-CM    1  Left knee dislocation, initial encounter  S83 105A    2  Lisfranc dislocation, left, initial encounter  S93 325A                   Subjective: Pt reports at this time his knee feels very tight today and is getting harder to move  He did his interventions everyday at home since last visit  Objective: See treatment diary below      Assessment: Pt demonstrated a progressively harder end feel at approximately 60-65 degrees of knee flexion in supine heel slides, but was able to relax more in a prolonged quad stretch on his stomach  He was educated on importance of completing the interventions everyday, multiple times a day  This to ensure full progression back to PLOF and to achieve his post manipulation level of knee flexion  He should continue as tolerated  He demonstrated ability to place 100% into the LE and ambulate without AD  Continue as tolerated  Plan: Continue per plan of care        Precautions:     Daily Treatment Diary  Date 07/13 7/27 7/29 8/12 8/15   Visit Number 6 7 8 9 10   FOTO     xx   Re-Eval           Manuals    Knee flexion PROM  SP PK PK   [address]   MK supine and prone    Patellar mobility  SP PK PK  [address]                   Neuro Re-Ed     Quad set  5"x20 /c SAQ 5"x20 /c SAQ 5" x 10, then SAQ 5" x 10     Hamstring curl  /c hip ext  5" 2x10      Stand 2x10    Bio dex weight shifting  50 -70% 3' 50 -70% 3' Scale 75% 5x3"                                     Ther Ex    Bike  L0 8'  5' L0 5' L0   5' and 5' post    5' and 5'    Quad stretch      20"x5    SLR x3 3# 30ea 0# 30x 0#supine, 3# SL/prone 30x     SLR ext prone 3# 30x + HS curl 3# 30x + HS curl 3# 30x + HS curl     Heel slides  10"x10 /c clinician OP 10"x10 /c clinician OP 10"x10 /c clinician OP  10"x10 w/ clin too    Gastroc stretch   HEP     Mini squats     2x10                    Ther Activity                                    Modalities

## 2022-08-17 ENCOUNTER — OFFICE VISIT (OUTPATIENT)
Dept: PHYSICAL THERAPY | Facility: CLINIC | Age: 37
End: 2022-08-17
Payer: COMMERCIAL

## 2022-08-17 DIAGNOSIS — S93.325A LISFRANC DISLOCATION, LEFT, INITIAL ENCOUNTER: ICD-10-CM

## 2022-08-17 DIAGNOSIS — S83.105A LEFT KNEE DISLOCATION, INITIAL ENCOUNTER: Primary | ICD-10-CM

## 2022-08-17 PROCEDURE — 97530 THERAPEUTIC ACTIVITIES: CPT

## 2022-08-17 PROCEDURE — 97140 MANUAL THERAPY 1/> REGIONS: CPT

## 2022-08-17 PROCEDURE — 97110 THERAPEUTIC EXERCISES: CPT

## 2022-08-17 NOTE — PROGRESS NOTES
Daily Note     Today's date: 2022  Patient name: Marilia Burdick  : 1985  MRN: [id number]  Referring provider: Micky Herr MD  Dx:   Encounter Diagnosis     ICD-10-CM    1  Left knee dislocation, initial encounter  S83 105A    2  Lisfranc dislocation, left, initial encounter  S93 325A        Start Time: 0800  Stop Time: 8244  Total time in clinic (min): 57 minutes  Subjective: Pt notes yesterday AM he was "not good" in re: to (L)LE pain + mobility, but is feeling "better" this AM    Pt states he did not have a phone or car yesterday and had his apt time mixed up and therefore did not show  Pt has (L) CAM boot donned, single axillary crutch, and limited (L) knee flex AROM visible during AROM  Objective: See treatment diary below    Assessment:  Pt cont to remain grossly limited in (R) knee flex mobility despite all interventions today  Post Tx pt achieves approx 58* flex /c hard end feel and c/o pain  Added HS curl /c resistance in attempt to fatigue HS to improve PROM: however ultimately did not change mobility  Pt would benefit from continued PT  Plan: Cont /c PT POC  Progress as tolerated       Precautions:     Daily Treatment Diary  Date 08/17  7/29 8/12 8/15   Visit Number   8 9 10   FOTO     xx   Re-Eval           Manuals    Knee flexion PROM  SP (58* flex)   PK    [facility]   MK supine and prone    Patellar mobility  SP  PK   [facility]                   Neuro Re-Ed     Quad set    5" x 10, then SAQ 5" x 10     Hamstring curl  BTB x20 EOB  5" 2x10      Stand 2x10    Biodex weight shifting   50 -70% 3' Scale 75% 5x3"                                     Ther Ex    Bike  8' MHP  5' L0   5' and 5' post    5' and 5'    Quad stretch  EOB 8' prone 8'    20"x5    LP MHP Flex (S) S3 F6 5'       SLR x3   0#supine, 3# SL/prone 30x     SLR ext prone   3# 30x + HS curl     Heel slides  8'   10"x10 /c clinician OP  10"x10 w/ clin too    Gastroc stretch   HEP     Mini squats     2x10 Ther Activity                                    Modalities                        Gato Joel, PTA

## 2022-08-18 ENCOUNTER — OFFICE VISIT (OUTPATIENT)
Dept: PHYSICAL THERAPY | Facility: CLINIC | Age: 37
End: 2022-08-18
Payer: COMMERCIAL

## 2022-08-18 DIAGNOSIS — S83.105A LEFT KNEE DISLOCATION, INITIAL ENCOUNTER: Primary | ICD-10-CM

## 2022-08-18 PROCEDURE — 97112 NEUROMUSCULAR REEDUCATION: CPT | Performed by: PHYSICAL THERAPIST

## 2022-08-18 PROCEDURE — 97110 THERAPEUTIC EXERCISES: CPT | Performed by: PHYSICAL THERAPIST

## 2022-08-18 PROCEDURE — 97140 MANUAL THERAPY 1/> REGIONS: CPT | Performed by: PHYSICAL THERAPIST

## 2022-08-18 NOTE — PROGRESS NOTES
Daily Note     Today's date: 2022  Patient name: Oneil Ortiz  : 1985  MRN: [id number]  Referring provider: Marcellus Robles MD  Dx:   Encounter Diagnosis     ICD-10-CM    1  Left knee dislocation, initial encounter  S83 105A                   Subjective: Pt reports at this time he is feeling a tightness again, but noticed that he can ambulate better with no reliance in the crutches  He is doing well today but is still struggling with bending the knee  Objective: See treatment diary below      Assessment: Tolerated treatment fair  Patient focused on knee flexion, but it became apparent that he is lacking a lot of knee quad strength today and is having difficulty placing much weight in to the LE independently  Plan: Continue per plan of care        Precautions:     Daily Treatment Diary  Date 08/17 8/18  8/12 8/15   Visit Number  12  9 10   FOTO     xx   Re-Eval           Manuals    Knee flexion PROM  SP (58* flex)  MK prone 65  [address]   MK supine and prone    Patellar mobility  SP    MK                   Neuro Re-Ed     Quad set         Hamstring curl  BTB x20 EOB Stand 10x       Stand 2x10    Biodex weight shifting    Scale 75% 5x3"     squat  2x10                               Ther Ex    Bike  8' MHP 10' MHP  5' and 5' post    5' and 5'    Quad stretch  EOB 8' prone 8' Prone 5'    20"x5    LP MHP Flex (S) S3 F6 5' S3 F7 15' MHP 25#       SLR x3        SLR ext prone        Heel slides  8'     10"x10 w/ clin too    Gastroc stretch        Step donw  0R 10x      Knee extension elvi  40x 16R               Ther Activity                                    Modalities

## 2022-08-19 ENCOUNTER — OFFICE VISIT (OUTPATIENT)
Dept: OBGYN CLINIC | Facility: MEDICAL CENTER | Age: 37
End: 2022-08-19

## 2022-08-19 ENCOUNTER — OFFICE VISIT (OUTPATIENT)
Dept: PHYSICAL THERAPY | Facility: CLINIC | Age: 37
End: 2022-08-19
Payer: COMMERCIAL

## 2022-08-19 VITALS
HEART RATE: 54 BPM | DIASTOLIC BLOOD PRESSURE: 75 MMHG | SYSTOLIC BLOOD PRESSURE: 130 MMHG | BODY MASS INDEX: 19.64 KG/M2 | WEIGHT: 145 LBS | HEIGHT: 72 IN

## 2022-08-19 DIAGNOSIS — M24.662 ARTHROFIBROSIS OF KNEE JOINT, LEFT: Primary | ICD-10-CM

## 2022-08-19 DIAGNOSIS — S93.325A LISFRANC DISLOCATION, LEFT, INITIAL ENCOUNTER: ICD-10-CM

## 2022-08-19 DIAGNOSIS — S83.105A LEFT KNEE DISLOCATION, INITIAL ENCOUNTER: Primary | ICD-10-CM

## 2022-08-19 PROCEDURE — 97112 NEUROMUSCULAR REEDUCATION: CPT

## 2022-08-19 PROCEDURE — 99024 POSTOP FOLLOW-UP VISIT: CPT | Performed by: ORTHOPAEDIC SURGERY

## 2022-08-19 PROCEDURE — 97140 MANUAL THERAPY 1/> REGIONS: CPT

## 2022-08-19 PROCEDURE — 97535 SELF CARE MNGMENT TRAINING: CPT

## 2022-08-19 PROCEDURE — 97110 THERAPEUTIC EXERCISES: CPT
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
## 2022-08-19 NOTE — PROGRESS NOTES
Daily Note     Today's date: 2022  Patient name: Lula Isbell  : 1985  MRN: [id number]  Referring provider: Jaden Castellanos MD  Dx:   Encounter Diagnosis     ICD-10-CM    1  Left knee dislocation, initial encounter  S83 105A    2  Lisfranc dislocation, left, initial encounter  S93 325A                   Subjective: Pt presents to PT reporting stiffness in the morning stating he is unable to bend the left knee  He reports he is able to bend the left knee better by the end of the day  Pt c/o posterior knee cap pain at end of session  Pt states he has MD follow up after this appointment so advised pt he can apply CP at home  Objective: See treatment diary below      Assessment: Pt demonstrates fair tolerance to manual therapy secondary to stiffness  Added contract/relax to assist with prone L knee flexion  Pt educated on proper breathing while manual therapy performed  Pt demonstrates compensation while performing standing knee ext at the [facility] requiring verbal and tactile cuing; pt demonstrates mild decrease of compensatory movement  Pt was educated on performing moderate knee flexion stretches at home while sitting in a chair in 10 min increments  Patient demonstrated fatigue post treatment, exhibited good technique with therapeutic exercises and would benefit from continued PT to increase flexibility, strength and function  Plan: Continue per plan of care        Precautions:     Daily Treatment Diary  Date      Visit Number  12 13     FOTO        Re-Eval           Manuals    Knee flexion PROM  SP (58* flex)  MK prone 65 PK Prone, contract relax     Patellar mobility  SP                       Neuro Re-Ed     Quad set         Hamstring curl  BTB x20 EOB Stand 10x  Stand 10x      Biodex weight shifting        squat  2x10  2 x 10                             Ther Ex    Bike  8' MHP 10' MHP 10' MHP     Quad stretch  EOB 8' prone 8' Prone 5'  nv     LP MHP Flex (S) S3 F6 5' S3 F7 15' MHP 25#  S3 F7 15' MHP 25#      SLR x3        SLR ext prone        Heel slides  8'        Gastroc stretch        Step down  0R 10x 0R x 15     Knee extension elvi  40x 16R  20x 17R     PT EDU   PK     Ther Activity                                    Modalities

## 2022-08-22 ENCOUNTER — OFFICE VISIT (OUTPATIENT)
Dept: PHYSICAL THERAPY | Facility: CLINIC | Age: 37
End: 2022-08-22
Payer: COMMERCIAL

## 2022-08-22 DIAGNOSIS — S93.325A LISFRANC DISLOCATION, LEFT, INITIAL ENCOUNTER: ICD-10-CM

## 2022-08-22 DIAGNOSIS — S83.105A LEFT KNEE DISLOCATION, INITIAL ENCOUNTER: Primary | ICD-10-CM

## 2022-08-22 PROCEDURE — 97110 THERAPEUTIC EXERCISES: CPT

## 2022-08-22 PROCEDURE — 97140 MANUAL THERAPY 1/> REGIONS: CPT

## 2022-08-22 NOTE — PROGRESS NOTES
Daily Note     Today's date: 2022  Patient name: Jaymie Barfield  : 1985  MRN: [id number]  Referring provider: Mariah Carrillo MD  Dx:   Encounter Diagnosis     ICD-10-CM    1  Left knee dislocation, initial encounter  S83 105A    2  Lisfranc dislocation, left, initial encounter  S93 325A        Start Time: 1100  Stop Time: 1210  Total time in clinic (min): 70 minutes    Subjective: Pt presents to PT reporting stiffness, mostly in the morning, stating he is unable to bend the left knee  Objective: See treatment diary below      Assessment: Pt demonstrates fair tolerance to manual therapy secondary to stiffness  Pt continues to show moderate limitations in knee flexion with manual therapy  Focused today's session on manual therapy  Patient demonstrated fatigue post treatment, exhibited good technique with therapeutic exercises and would benefit from continued PT to increase flexibility, strength and function  Plan: Continue per plan of care        Precautions:     Daily Treatment Diary  Date     Visit Number  12 13 14    FOTO        Re-Eval           Manuals    Knee flexion PROM  SP (58* flex)  MK prone 65 PK Prone, contract relax HY    Patellar mobility  SP                       Neuro Re-Ed     Quad set         Hamstring curl  BTB x20 EOB Stand 10x  Stand 10x  Stand 10x    Biodex weight shifting        squat  2x10  2 x 10 2x10                            Ther Ex    Bike  8' MHP 10' MHP 10' MHP 10' MHP    Quad stretch  EOB 8' prone 8' Prone 5'  nv     LP MHP Flex (S) S3 F6 5' S3 F7 15' MHP 25#  S3 F7 15' MHP 25#  S3 F7 15' MHP 35#     SLR x3        SLR ext prone        Heel slides  8'        Gastroc stretch        Step down  0R 10x 0R x 15 nv    Knee extension elvi  40x 16R  20x 17R nv    PT EDU   PK     Ther Activity                                    Modalities

## 2022-08-23 ENCOUNTER — OFFICE VISIT (OUTPATIENT)
Dept: PHYSICAL THERAPY | Facility: CLINIC | Age: 37
End: 2022-08-23
Payer: COMMERCIAL

## 2022-08-23 DIAGNOSIS — S83.105A LEFT KNEE DISLOCATION, INITIAL ENCOUNTER: Primary | ICD-10-CM

## 2022-08-23 PROCEDURE — 97140 MANUAL THERAPY 1/> REGIONS: CPT | Performed by: PHYSICAL THERAPIST

## 2022-08-23 PROCEDURE — 97110 THERAPEUTIC EXERCISES: CPT | Performed by: PHYSICAL THERAPIST

## 2022-08-23 NOTE — PROGRESS NOTES
Daily Note     Today's date: 2022  Patient name: Kyara Chen  : 1985  MRN: [id number]  Referring provider: Markus Escobedo MD  Dx:   Encounter Diagnosis     ICD-10-CM    1  Left knee dislocation, initial encounter  S83 105A                   Subjective: Pt reports at this time he has some increase in pian in the foot when placing weight into it  Objective: See treatment diary below      Assessment: he is currently only achieving 68-70 degrees of motion continually and does experience a popping sensation in the knee during manual stretches today  Due to increase in foot pain today a CP was added before the end of the session and educated to continue stretching as tolerated  Plan: Continue per plan of care        Precautions:     Daily Treatment Diary  Date    Visit Number  12 13 14    FOTO     NV   Re-Eval           Manuals    Knee flexion PROM  SP (58* flex)  MK prone 65 PK Prone, contract relax HY MK   Patellar mobility  SP                       Neuro Re-Ed     Quad set         Hamstring curl  BTB x20 EOB Stand 10x  Stand 10x  Stand 10x    Biodex weight shifting        squat  2x10  2 x 10 2x10                            Ther Ex    Bike  8' MHP 10' MHP 10' MHP 10' MHP 10' MHP   Quad stretch  EOB 8' prone 8' Prone 5'  nv  30"x3   LP MHP Flex (S) S3 F6 5' S3 F7 15' MHP 25#  S3 F7 15' MHP 25#  S3 F7 15' MHP 35#     SLR x3        SLR ext prone        Heel slides  8'        Gastroc stretch        Step down  0R 10x 0R x 15 nv    Knee extension elvi  40x 16R  20x 17R nv    PT EDU   PK     Ther Activity                                    Modalities    CP post     10'

## 2022-08-24 ENCOUNTER — APPOINTMENT (OUTPATIENT)
Dept: RADIOLOGY | Facility: AMBULARY SURGERY CENTER | Age: 37
End: 2022-08-24
Attending: ORTHOPAEDIC SURGERY
Payer: COMMERCIAL

## 2022-08-24 ENCOUNTER — OFFICE VISIT (OUTPATIENT)
Dept: OBGYN CLINIC | Facility: CLINIC | Age: 37
End: 2022-08-24

## 2022-08-24 ENCOUNTER — OFFICE VISIT (OUTPATIENT)
Dept: PHYSICAL THERAPY | Facility: CLINIC | Age: 37
End: 2022-08-24
Payer: COMMERCIAL

## 2022-08-24 VITALS — HEIGHT: 72 IN | WEIGHT: 145 LBS | BODY MASS INDEX: 19.64 KG/M2

## 2022-08-24 DIAGNOSIS — Z98.890 S/P ACL REPAIR: ICD-10-CM

## 2022-08-24 DIAGNOSIS — S92.312A CLOSED DISPLACED FRACTURE OF FIRST METATARSAL BONE OF LEFT FOOT, INITIAL ENCOUNTER: Primary | ICD-10-CM

## 2022-08-24 DIAGNOSIS — S92.312A CLOSED DISPLACED FRACTURE OF FIRST METATARSAL BONE OF LEFT FOOT, INITIAL ENCOUNTER: ICD-10-CM

## 2022-08-24 DIAGNOSIS — S83.105A LEFT KNEE DISLOCATION, INITIAL ENCOUNTER: Primary | ICD-10-CM

## 2022-08-24 PROCEDURE — 99024 POSTOP FOLLOW-UP VISIT: CPT | Performed by: ORTHOPAEDIC SURGERY

## 2022-08-24 PROCEDURE — 97110 THERAPEUTIC EXERCISES: CPT | Performed by: PHYSICAL THERAPIST

## 2022-08-24 PROCEDURE — 97140 MANUAL THERAPY 1/> REGIONS: CPT | Performed by: PHYSICAL THERAPIST

## 2022-08-24 PROCEDURE — 73630 X-RAY EXAM OF FOOT: CPT

## 2022-08-24 NOTE — PROGRESS NOTES
Daily Note     Today's date: 2022  Patient name: Rosales Schmidt  : 1985  MRN: [id number]  Referring provider: Vinay Deal MD  Dx:   Encounter Diagnosis     ICD-10-CM    1  Left knee dislocation, initial encounter  S83 105A                   Subjective: Pt reports at the start of every morning he notices that the knee starts to stiffen up again  He has been completing his interventions at least 2-3 times a day, but does not feel he is making much progress  He does have a follow up apt with his foot surgeon today  Objective: See treatment diary below      Assessment: Patient is now approximately two weeks post manipulation (22), and is continually demonstrating a soft end feel around 65-70 degrees of knee flexion ROM  He would benefit from more consistent stretching over the course of the day with use of a knee flexion dynasplint device  A prolonged stretch on the wall was attempted today to allow him to recreate passive stretching at home, although continually stated that he had discomfort in the quad due to stretch  Following this soft tissue mobilization was utilized in the quad to help facilitate motion and blood flow to reduce discomfort  He was educated on this and will continue to work towards his goals  Plan: Continue per plan of care        Precautions:     Daily Treatment Diary  Date    Visit Number 16  13 14    FOTO     NV   Re-Eval           Manuals    Knee flexion PROM   [address]   PK Prone, contract relax HY MK   Patellar mobility         Knee flexion mobilization MK gr 3               Neuro Re-Ed     Quad set  DC       Hamstring curl  NV  Stand 10x  Stand 10x    Biodex weight shifting        squat 20x   2 x 10 2x10                            Ther Ex    Bike  8' MHP   10' MHP 10' MHP 10' MHP   Quad stretch    nv  30"x3   LP MHP Flex (S)   S3 F7 15' MHP 25#  S3 F7 15' MHP 35#     SLR x3        SLR ext prone        Prolong hang on wall 5# 10' Gastroc stretch        Step down   0R x 15 nv    Knee extension elvi   20x 17R nv    PT EDU Mk  PK     Ther Activity                                    Modalities    CP post     10'

## 2022-08-24 NOTE — PROGRESS NOTES
BONILLA Evangelista  Attending, Orthopaedic Surgery  Foot and Ankle  [facility]      ORTHOPAEDIC FOOT AND ANKLE POST-OP VISIT     Procedure:     Left lisfranc arthrodesis with ORIF of 1st MT fracture       Date of surgery:   5/26/22      PLAN  1  Weightbearing Status- WBAT operative extremity in supportive sneaker  Patient has been in CAM boot since his last visit  He was instructed to wean from CAM boot into supportive sneaker 2 weeks ago but has not done this  2  DVT prophylaxis- ASA 325mg BID, completed  3  Continue to elevate 23hrs/day getting up 1x per hour to prevent a blood clot  4  Pain control- OTC pain medication  5  RTC in 3 month(s)  6  Xrays needed next visit - yes Weightbearing Foot    History of Present Illness:   Chief Complaint:   Chief Complaint   Patient presents with   2021 Ramesh Rosamaria Pickens is a 44 y o  male who is being seen for post-operative visit for the above procedure  Pain is well controlled and the patient has successfully transitioned to OTC pain medicines  he has completed ASA 325mg BID for DVT prophylaxis  Patient has been WBAT in a CAM boot  Review of Systems:  General- denies fever/chills  Respiratory- denies cough or SOB  Cardio- denies chest pain or palpitations  GI- denies abdominal pain  Musculoskeletal- Negative except noted above  Skin- denies rashes or wounds    Physical Exam:   Ht 6' (1 829 m)   Wt 65 8 kg (145 lb)   BMI 19 67 kg/m²   General/Constitutional: No apparent distress: well-nourished and well developed  Eyes: normal ocular motion  Lymphatic: No appreciable lymphadenopathy  Respiratory: Non-labored breathing  Vascular: No edema, swelling or tenderness, except as noted in detailed exam   Integumentary: No impressive skin lesions present, except as noted in detailed exam   Neuro: No ataxia or tremors noted  Psych: Normal mood and affect, oriented to person, place and time   Appropriate affect  Musculoskeletal: Normal, except as noted in detailed exam and in HPI  Examination    left        Incision Clean, dry, intact  Sutures Previously removed  Ecchymosis none    Swelling Mild    Sensation Intact to light touch throughout sural, saphenous, superficial peroneal, deep peroneal and medial/lateral plantar nerve distributions  [place] 5 07 filament (10g) testing deferred  Cardiovascular Brisk capillary refill < 2 seconds,intact DP and PT pulses    Special Tests None      Imaging Studies:   3 views of the left foot were taken, reviewed and interpreted independently that demonstrate hardware in expected alignment and position without signs of failure or loosening  Reviewed by me personally  Alene Fordyce Lachman, MD  Foot & Ankle Surgery   Department of [address] [place] [address]      I personally performed the service  Alene Fordyce Lachman, MD    Scribe Attestation    I,:  Marissa Cheung am acting as a scribe while in the presence of the attending physician :       I,:  Garret Morrissey MD personally performed the services described in this documentation    as scribed in my presence :

## 2022-08-25 ENCOUNTER — APPOINTMENT (OUTPATIENT)
Dept: PHYSICAL THERAPY | Facility: CLINIC | Age: 37
End: 2022-08-25
Payer: COMMERCIAL

## 2022-08-26 ENCOUNTER — OFFICE VISIT (OUTPATIENT)
Dept: PHYSICAL THERAPY | Facility: CLINIC | Age: 37
End: 2022-08-26
Payer: COMMERCIAL

## 2022-08-26 DIAGNOSIS — S93.325A LISFRANC DISLOCATION, LEFT, INITIAL ENCOUNTER: ICD-10-CM

## 2022-08-26 DIAGNOSIS — Z98.890 S/P ACL REPAIR: ICD-10-CM

## 2022-08-26 DIAGNOSIS — S83.105A LEFT KNEE DISLOCATION, INITIAL ENCOUNTER: Primary | ICD-10-CM

## 2022-08-26 PROCEDURE — 97530 THERAPEUTIC ACTIVITIES: CPT

## 2022-08-26 PROCEDURE — 97112 NEUROMUSCULAR REEDUCATION: CPT

## 2022-08-26 PROCEDURE — 97110 THERAPEUTIC EXERCISES: CPT

## 2022-08-26 NOTE — PROGRESS NOTES
Daily Note     Today's date: 2022  Patient name: Kennedy Naranjo  : 1985  MRN: [id number]  Referring provider: Lcuia Knott MD  Dx:   Encounter Diagnosis     ICD-10-CM    1  Left knee dislocation, initial encounter  S83 105A    2  S/P ACL repair  Z98 890    3  Lisfranc dislocation, left, initial encounter  S93 325A        Start Time:   Stop Time:   Total time in clinic (min): 61 minutes  Subjective:  Pt arrives to Tx /c BL sneaker donned - noting it feels "pretty good" to finally wear sneakers  Pt cont to amb /c significant gait deviations /c limited (L) knee flex AROM  Objective: See treatment diary below    Assessment: Entirety of Tx performed with sneakers donned  Added DL and SL on LP to address quad strengthening and encourage AROM throughout (L)LE to improve mobility and fatigue muscles prior to stretching  Pt reports "a little pain" in (L) knee during ext; however, notes it is manageable and opts to cont /c LP exercises  Pt cont to avoid active (L) knee flex during ambulation and transfers between activities even if he has the AROM required for the task  Added balance activity to address NMR-ed now that pt is able to ambulate without sneakers; pt demonstrates good balance during tandem stance on foam /c min sway and ability to self correct - does note increased challenge /c (L)LE posterior; however may benefit from progression to BOSU NV  Pt appropriatley challenged /c (L) SLS on foam   Performed up/overs /c (L)LE bias - pt significantly challenged; however, demonstrates improved form following cuing for foot placement  Pt reports slight pain along inferior patella-tibia junction during wedge stretch  Plan: Cont /c PT POC  Progress as tolerated        Precautions:     Daily Treatment Diary  Date    Visit Number 16 17      FOTO     NV   Re-Eval           Manuals    Knee flexion PROM   [address]  SP    [address]   Patellar mobility         Knee flexion mobilization [address] gr 3               Neuro Re-Ed     Quad set  DC       Hamstring curl  NV 30x prone      Biodex weight shifting        squat 20x        (L) SLS  20"x3 airex      Tandem Stance  30"x2ea airex              Ther Ex    Bike  8' MHP  8' MHP   10' MHP   Quad stretch   2' prone   30"x3   LP MHP Flex (S)  5'       DL LP  F8 55#x30      (L) SL LP  F8 25#x30      SLR x3        SLR ext prone        Prolong hang on wall 5# 10' 5# 5'      Gastroc stretch    2' L2 wedge + 2' soleous      Step down  0R 20x up/over      Knee extension elvi        PT EDU Mk       Ther Activity                                    Modalities    CP post     10'                Donnamarie Session, PTA

## 2022-08-29 ENCOUNTER — OFFICE VISIT (OUTPATIENT)
Dept: PHYSICAL THERAPY | Facility: CLINIC | Age: 37
End: 2022-08-29
Payer: COMMERCIAL

## 2022-08-29 DIAGNOSIS — S93.325A LISFRANC DISLOCATION, LEFT, INITIAL ENCOUNTER: ICD-10-CM

## 2022-08-29 DIAGNOSIS — S83.105A LEFT KNEE DISLOCATION, INITIAL ENCOUNTER: Primary | ICD-10-CM

## 2022-08-29 DIAGNOSIS — Z98.890 S/P ACL REPAIR: ICD-10-CM

## 2022-08-29 PROCEDURE — 97110 THERAPEUTIC EXERCISES: CPT | Performed by: PHYSICAL THERAPIST

## 2022-08-29 PROCEDURE — 97112 NEUROMUSCULAR REEDUCATION: CPT | Performed by: PHYSICAL THERAPIST

## 2022-08-29 PROCEDURE — 97140 MANUAL THERAPY 1/> REGIONS: CPT | Performed by: PHYSICAL THERAPIST

## 2022-08-29 NOTE — PROGRESS NOTES
Daily Note     Today's date: 2022  Patient name: Kaitlyn Jay  : 1985  MRN: [id number]  Referring provider: Pawan Corado MD  Dx:   Encounter Diagnosis     ICD-10-CM    1  Left knee dislocation, initial encounter  S83 105A    2  S/P ACL repair  Z98 890    3  Lisfranc dislocation, left, initial encounter  S93 325A                   Subjective: pt reports over the weekend he noticed an improvement in his ROM and tolerance to activities  He is doing his stretches everyday  He is still waiting on hearing back from insurance for [employer]  Objective: See treatment diary below      Assessment: Tolerated treatment fair  Patient demonstrated close to 80 degrees of knee flexion today  He did struggle with tolerance to continued PROM into knee flexion  however today did note increased discomfort with knee extension due to tight gastroc and hamstring  Was educated that we were focusing on knee flexion vs knee extension  His extension is lacking and can be improved now after taking the boot off  Will continue to focus on building strength and gaining motion  Plan: Continue per plan of care        Precautions:     Daily Treatment Diary  Date    Visit Number 16 17 18     FOTO     NV   Re-Eval           Manuals    Knee flexion PROM   [address]  SP  [address]   Patellar mobility     [address]     Knee flexion mobilization MK gr 3  MK     STM quad   [address]      Neuro Re-Ed             Hamstring curl  NV 30x prone      Biodex weight shifting        squat 20x   20x     (L) SLS  20"x3 airex NV     Tandem Stance  30"x2ea airex              Ther Ex    Bike  8' MHP  8' MHP 8' mhp and 5' post  10' MHP   Quad stretch   2' prone   30"x3   LP MHP Flex (S)  5'       DL LP  F8 55#x30 F7 55#x30     (L) SL LP  F8 25#x30 F7 25#x30     SLR x3        SLR ext prone        Prolong hang on wall 5# 10' 5# 5'      Gastroc stretch    2' L2 wedge + 2' soleous Soleus 20"x5     Step down  0R 20x up/over 0R 20x     Knee Nephrology Daily Progress Note    Date of Service  1/12/2021    Chief Complaint  64 y.o. male with PMH of ESRD on HD MWF, HTN, anemia of CKD, and CKD-MBD  who presents to the emergency department complaining of shortness of breath, productive cough and malaise. It has become progressively worse over the last few days and today at dialysis he felt the SOB was increasing and was hypoxic with O2 saturation in the low 80's on room air and was therefore sent for further evaluation. The patient has tested positive for Covid-19 infection and chest X-ray today is concerning for bilateral infection/pneumonia and moderate pulmonary edema. He was most recently discharged from the hospital on 12/22/20 after being treated for large pericardial effusion and pericarditis.     Interval Problem Update  1/11 - Consult done  1/12 - RN/MD notes reviewed, HD yesterday with 2.6L UF, VSS on 1.5L NC, labs stable     Review of Systems  ROS   Deferred due to Covid +    Physical Exam  Temp:  [36.1 °C (96.9 °F)-36.7 °C (98.1 °F)] 36.6 °C (97.8 °F)  Pulse:  [73-80] 79  Resp:  [16-24] 18  BP: (111-129)/(58-64) 115/64  SpO2:  [88 %-99 %] 97 %    Physical Exam  Deferred due to Covid +    Fluids    Intake/Output Summary (Last 24 hours) at 1/12/2021 0816  Last data filed at 1/12/2021 0500  Gross per 24 hour   Intake 1100 ml   Output 3100 ml   Net -2000 ml       Laboratory  Recent Labs     01/11/21  0600 01/12/21  0317   WBC 5.9 3.5*   RBC 3.12* 3.22*   HEMOGLOBIN 9.2* 9.5*   HEMATOCRIT 29.4* 30.5*   MCV 94.2 94.7   MCH 29.5 29.5   MCHC 31.3* 31.1*   RDW 54.3* 53.1*   PLATELETCT 164 169   MPV 10.2 10.9     Recent Labs     01/11/21  0600 01/12/21  0317   SODIUM 136 137   POTASSIUM 4.4 5.1   CHLORIDE 92* 96   CO2 25 26   GLUCOSE 86 136*   BUN 86* 52*   CREATININE 10.79* 7.31*   CALCIUM 8.6 8.9         No results for input(s): NTPROBNP in the last 72 hours.  Recent Labs     01/12/21  0317   TRIGLYCERIDE 181*   HDL 26*   LDL 65       Imaging  Available  extension elvi        PT KISHAN Mk       Ther Activity                                    Modalities    CP post     10' labs, imaging and clinical documentation reviewed.     Assessment/Plan  # ESRD  - Etiology likely 2/2 congenital solitary kidney  - Normally dialyzes qMWF at [facility] via L AVF  - Living XPL surgery pending final work up at [facility]  # Acute hypoxic respiratory failure, improving   # Covid-19 PNA  - On decadron   # Pulmonary edema   - s/p dose of IV Lasix 1/11  - UF with HD   # HTN  - Goal BP < 140/90  - At goal  - On metoprolol   # Anemia of CKD, below target  - Goal Hgb 10-11  # CKD-MBD  - Managed at OP unit  - On sevelamer with meals   - On Sensipar   # Recent pericardial effusion/pericarditis  - On colchicine      PLAN:  - iHD qMWF, next treatment due tomorrow (WED)  - UF as tolerated  - No dietary protein restrictions  - Dose all meds per ESRD  - He takes ativan 1 mg prior to each HD, please continue that while inpatient  - Low sodium renal diet with fluid restriction   - Continue home medications   - Covid-19 treatment per primary team   - Check iron stores   - Transfuse PRN Hgb <7   - Follow labs    Thank you,

## 2022-08-30 ENCOUNTER — TELEPHONE (OUTPATIENT)
Dept: OBGYN CLINIC | Facility: CLINIC | Age: 37
End: 2022-08-30

## 2022-08-30 ENCOUNTER — OFFICE VISIT (OUTPATIENT)
Dept: PHYSICAL THERAPY | Facility: CLINIC | Age: 37
End: 2022-08-30
Payer: COMMERCIAL

## 2022-08-30 DIAGNOSIS — S83.105A LEFT KNEE DISLOCATION, INITIAL ENCOUNTER: Primary | ICD-10-CM

## 2022-08-30 DIAGNOSIS — Z98.890 S/P ACL REPAIR: ICD-10-CM

## 2022-08-30 PROCEDURE — 97110 THERAPEUTIC EXERCISES: CPT | Performed by: PHYSICAL THERAPIST

## 2022-08-30 PROCEDURE — 97140 MANUAL THERAPY 1/> REGIONS: CPT | Performed by: PHYSICAL THERAPIST

## 2022-08-30 PROCEDURE — 97112 NEUROMUSCULAR REEDUCATION: CPT | Performed by: PHYSICAL THERAPIST

## 2022-08-30 NOTE — PROGRESS NOTES
Daily Note     Today's date: 2022  Patient name: Christo Tamez  : 1985  MRN: [id number]  Referring provider: Fabiola Lance MD  Dx:   Encounter Diagnosis     ICD-10-CM    1  Left knee dislocation, initial encounter  S83 105A    2  S/P ACL repair  [id number]           Stop Time: 99     Subjective: pt reports at this time he is doing better than yesterday and is continuing all of his interventions at home  He is still feeling a little tight in the anterior portion of the knee  Objective: See treatment diary below    Assessment: Pt able to achieve approx 84* (R) knee flex during SL on LP  He will continue to work on strength and ROM  Will be fitted for dynasplint during next visit  Plan: Cont /c POC  Progress as tolerated        Precautions:     Daily Treatment Diary  Date     Visit Number 26 94 11 71    FOTO        Re-Eval           Manuals    Knee flexion PROM   [address]  SP  [address] [facility]    Patellar mobility     [address]    Knee flexion mobilization MK gr 3  MK     STM quad   [address]      Neuro Re-Ed             Hamstring curl  NV 30x prone      Biodex weight shifting        squat 20x   20x     (L) SLS  20"x3 airex NV --    Tandem Stance  30"x2ea airex      Hamstring stretch    20"x3     Ther Ex    Bike  8' MHP  8' MHP 8' mhp and 5' post 8'    Quad stretch   2' prone      LP MHP Flex (S)  5'       DL LP  F8 55#x30 F7 55#x30 F7 65#x40    (L) SL LP  F8 25#x30 F7 25#x30 F7 35#x40    SLR x3        SLR ext prone        Prolong hang on wall 5# 10' 5# 5'  10' 5#     Gastroc stretch    2' L2 wedge + 2' soleous Soleus 20"x5 Slant both 20"x3     Step down  0R 20x up/over 0R 20x     Knee extension elvi        PT EDU Mk       Ther Activity                                    Modalities    CP post

## 2022-08-30 NOTE — TELEPHONE ENCOUNTER
Mary Garcia  Re: work note   Cb#: [phone]      Patient is in need of a note for work with his current restrictions if any for his left knee         He would like a call back once completed as he will pick it up

## 2022-08-31 ENCOUNTER — OFFICE VISIT (OUTPATIENT)
Dept: PHYSICAL THERAPY | Facility: CLINIC | Age: 37
End: 2022-08-31
Payer: COMMERCIAL

## 2022-08-31 DIAGNOSIS — S93.325A LISFRANC DISLOCATION, LEFT, INITIAL ENCOUNTER: ICD-10-CM

## 2022-08-31 DIAGNOSIS — Z98.890 S/P ACL REPAIR: ICD-10-CM

## 2022-08-31 DIAGNOSIS — S83.105A LEFT KNEE DISLOCATION, INITIAL ENCOUNTER: Primary | ICD-10-CM

## 2022-08-31 PROCEDURE — 97140 MANUAL THERAPY 1/> REGIONS: CPT

## 2022-08-31 PROCEDURE — 97112 NEUROMUSCULAR REEDUCATION: CPT

## 2022-08-31 PROCEDURE — 97535 SELF CARE MNGMENT TRAINING: CPT

## 2022-08-31 PROCEDURE — 97110 THERAPEUTIC EXERCISES: CPT

## 2022-08-31 NOTE — TELEPHONE ENCOUNTER
I contacted the patient and let him know is putting in a work note that was stating sedentary duty only at this time  He states he will pick it up

## 2022-08-31 NOTE — PROGRESS NOTES
Daily Note     Today's date: 2022  Patient name: Marylen Risser  : 1985  MRN: [id number]  Referring provider: Mya Good MD  Dx:   Encounter Diagnosis     ICD-10-CM    1  Left knee dislocation, initial encounter  S83 105A    2  S/P ACL repair  Z98 890    3  Lisfranc dislocation, left, initial encounter  S93 325A                   Subjective: Pt presents to PT reporting no pain with regular activity but reports stretch pain in distal L quad with L knee flexion  Objective: See treatment diary below  Representative from [employer] was fit dynasplint for patient  He was fitted and  educated on how to use, adjust and time while using device  Pt also practiced donning and doffing device  Assessment:  Pt demonstrates good tolerance to TE performing  Noted improvement with technique and flexion while performing squats  Reviewed instructions from rep for splint and the type of stretch his should be getting  Pt reports an understanding  Patient demonstrated fatigue post treatment, exhibited good technique with therapeutic exercises and would benefit from continued PT to increase flexibility, strength and function  Plan: Continue per plan of care        Precautions:     Daily Treatment Diary  Date    Visit Number 48 01 84 41 29   FOTO     XX   Re-Eval           Manuals    Knee flexion PROM  [address]  SP  [address] [facility] [address]   Patellar mobility    [address]   Knee flexion mobilization MK gr 3  MK     STM quad   MK      Neuro Re-Ed             Hamstring curl  NV 30x prone   30x prone   Biodex weight shifting        squat 20x   20x 20x 30x   (L) SLS  20"x3 airex NV --    Tandem Stance  30"x2ea airex      Hamstring stretch    20"x3  30" x 3   Ther Ex    Bike  8' MHP  8' MHP 8' mhp and 5' post 8' 5'   Quad stretch   2' prone      LP MHP Flex (S)  5'       DL LP  F8 55#x30 F7 55#x30 F7 65#x40    (L) SL LP  F8 25#x30 F7 25#x30 F7 35#x40    SLR x3        SLR ext prone Prolong hang on wall 5# 10' 5# 5'  10' 5#     Gastroc stretch    2' L2 wedge + 2' soleous Soleus 20"x5 Slant both 20"x3  Slant both 20"x3    Step down  0R 20x up/over 0R 20x     Knee extension elvi        PT EDU Mk    PK   Ther Activity                                    Modalities    CP post

## 2022-09-01 ENCOUNTER — OFFICE VISIT (OUTPATIENT)
Dept: PHYSICAL THERAPY | Facility: CLINIC | Age: 37
End: 2022-09-01
Payer: COMMERCIAL

## 2022-09-01 DIAGNOSIS — S83.105A LEFT KNEE DISLOCATION, INITIAL ENCOUNTER: Primary | ICD-10-CM

## 2022-09-01 DIAGNOSIS — S93.325A LISFRANC DISLOCATION, LEFT, INITIAL ENCOUNTER: ICD-10-CM

## 2022-09-01 DIAGNOSIS — Z98.890 S/P ACL REPAIR: ICD-10-CM

## 2022-09-01 PROCEDURE — 97140 MANUAL THERAPY 1/> REGIONS: CPT | Performed by: PHYSICAL THERAPIST

## 2022-09-01 PROCEDURE — 97110 THERAPEUTIC EXERCISES: CPT | Performed by: PHYSICAL THERAPIST

## 2022-09-01 PROCEDURE — 97112 NEUROMUSCULAR REEDUCATION: CPT | Performed by: PHYSICAL THERAPIST

## 2022-09-01 NOTE — PROGRESS NOTES
Daily Note     Today's date: 2022  Patient name: Omi Dunlap  : 1985  MRN: [id number]  Referring provider: Margie Devries MD  Dx:   Encounter Diagnosis     ICD-10-CM    1  Left knee dislocation, initial encounter  S83 105A    2  S/P ACL repair  Z98 890    3  Lisfranc dislocation, left, initial encounter  S93 325A                   Subjective: Pt reports at this time feeling like he is progressing and has been able to use the dynasplint over the last day and noticed that it is good for helping him stretch  He has noticed improvement in his tolerance to ascending and descending  However, ascending is easier and is does not feel comfortable yet descending  Objective: See treatment diary below      Assessment: Tolerated treatment well  Patient demonstrated fatigue post treatment and exhibited good technique with therapeutic exercises, he is demonstrated improved mobility compared to last visit  However, is still demonstrating 85 degrees of motion  His strength is also progressing at 4-/5  Plan: Continue per plan of care        Precautions:     Daily Treatment Diary  Date    Visit Number 93  66 26 90   FOTO     XX   Re-Eval           Manuals    Knee flexion PROM  [address]   Patellar mobility  [address]  [address]   Knee flexion mobilization [address]  [address]     STM quad   [address]      Neuro Re-Ed     Chair scooting 2 laps        Hamstring curl      30x prone   Biodex weight shifting        squat 20x   20x 20x 30x   (L) SLS   NV --    Tandem Stance        Hamstring stretch    20"x3  30" x 3   Ther Ex    Bike  8' w/ MHP  8' mhp and 5' post 8' 5'   Quad stretch         LAQ 4# 2x10       DL LP   F7 55#x30 F7 65#x40    (L) SL LP F 6 41# 30x  F7 25#x30 F7 35#x40    SLR x3        SLR ext prone        Prolong hang on wall    10' 5#     Gastroc stretch Slant both 20"x3   Soleus 20"x5 Slant both 20"x3  Slant both 20"x3    Step down 2R 10x  0R 20x     Knee extension elvi        PT EDU PK   Ther Activity    Step up 3R 10x                                Modalities    CP post

## 2022-09-02 ENCOUNTER — TELEPHONE (OUTPATIENT)
Dept: OBGYN CLINIC | Facility: HOSPITAL | Age: 37
End: 2022-09-02

## 2022-09-02 ENCOUNTER — OFFICE VISIT (OUTPATIENT)
Dept: PHYSICAL THERAPY | Facility: CLINIC | Age: 37
End: 2022-09-02
Payer: COMMERCIAL

## 2022-09-02 DIAGNOSIS — S93.325A LISFRANC DISLOCATION, LEFT, INITIAL ENCOUNTER: ICD-10-CM

## 2022-09-02 DIAGNOSIS — Z98.890 S/P ACL REPAIR: ICD-10-CM

## 2022-09-02 DIAGNOSIS — S83.105A LEFT KNEE DISLOCATION, INITIAL ENCOUNTER: Primary | ICD-10-CM

## 2022-09-02 PROCEDURE — 97140 MANUAL THERAPY 1/> REGIONS: CPT

## 2022-09-02 PROCEDURE — 97110 THERAPEUTIC EXERCISES: CPT

## 2022-09-02 PROCEDURE — 97112 NEUROMUSCULAR REEDUCATION: CPT

## 2022-09-02 NOTE — TELEPHONE ENCOUNTER
Great thanks, he felt he could go back to work now so I will discuss it more with him and try to give him some lifting restrictions for a period of time since he feels he can go back at this time

## 2022-09-02 NOTE — TELEPHONE ENCOUNTER
We do not have any specific restrictions for him at this point  He just may not be ready for a couple of more weeks  In most cases, people that have jobs that require them to be on their feet for the whole day take 4 to 4 5 months to return to work without any restrictions  If he feels ready, he can be released, but if he is still recovering, he may require more time to get ready for work  The maximum amount of time we keep people out of work is 4 5 months after this surgery

## 2022-09-02 NOTE — PROGRESS NOTES
Daily Note     Today's date: 2022  Patient name: Ruy Cornelius  : 1985  MRN: [id number]  Referring provider: Floyd Chavez MD  Dx:   Encounter Diagnosis     ICD-10-CM    1  Left knee dislocation, initial encounter  S83 105A    2  S/P ACL repair  [id number]    3  Lisfranc dislocation, left, initial encounter  S93 325A                   Subjective: Pt reports that he is feeling better today compared to yesterday with 2/10 pain  Notes that pain increases as he trying to stretch it more  Objective: See treatment diary below      Assessment: Tolerated treatment well  Denied any pain with ambulation throughout session today  Performed all exercises to tolerance with a tight end feel present into knee flexion  Re educated pt on the importance of his HEP and stretching to increase his ROM  Patient demonstrated fatigue post treatment and exhibited good technique with therapeutic exercises  Plan: Continue per plan of care        Precautions:     Daily Treatment Diary  Date    Visit Number 20 21  19 19   FOTO     XX   Re-Eval           Manuals    Knee flexion PROM   [address]  [address]   Patellar mobility   [address]  [address]   Knee flexion mobilization [address]       STM quad        Neuro Re-Ed     Chair scooting 2 laps  2 laps      Hamstring curl      30x prone   Biodex weight shifting        squat 20x  20x  20x 30x   (L) SLS    --    Tandem Stance        Hamstring stretch    20"x3  30" x 3   Ther Ex    Bike  8' w/ MHP 8'  8' 5'   Quad stretch         LAQ 4# 2x10 4# 2x10      DL LP    F7 65#x40    (L) SL LP F 6 41# 30x F 6 45# 30x  F7 35#x40    SLR x3        SLR ext prone        Prolong hang on wall    10' 5#     Gastroc stretch Slant both 20"x3  Slant both 20"x3   Slant both 20"x3  Slant both 20"x3    Step down 2R 10x 2R 10x      Knee extension elvi        PT EDU     PK   Ther Activity    Step up 3R 10x  3R 10x                              Modalities    CP post

## 2022-09-02 NOTE — TELEPHONE ENCOUNTER
Holland Auguste I was going to put a work note in for this patient who you did surgery on for Lisfranc fracture a few months ago  At this time are there any restrictions that you would want the patient to have in regards to his foot that you want may include in the note  I am going to provide him a note in regards to Zuleima's knee surgery that will mainly be lifting restrictions  Patient states that he used to get Gout medicine from his former doctor, Dr. Luis Miller, but he doesn't remember the name of the medicine.  He was wanting to see if Dr. Cordoba could call him and ask what that medicine was.  [facility].  She can be reached at [phone]

## 2022-09-02 NOTE — TELEPHONE ENCOUNTER
DR Luisa Nelson  RE: work letter restrictions  CB: [phone]    Patient called stating he needs weight restrictions placed on work letter with more specific guidelines  Employer is asking for amount of weight he should be lifting

## 2022-09-06 ENCOUNTER — OFFICE VISIT (OUTPATIENT)
Dept: PHYSICAL THERAPY | Facility: CLINIC | Age: 37
End: 2022-09-06
Payer: COMMERCIAL

## 2022-09-06 DIAGNOSIS — Z98.890 S/P ACL REPAIR: ICD-10-CM

## 2022-09-06 DIAGNOSIS — S83.105A LEFT KNEE DISLOCATION, INITIAL ENCOUNTER: Primary | ICD-10-CM

## 2022-09-06 PROCEDURE — 97110 THERAPEUTIC EXERCISES: CPT | Performed by: PHYSICAL THERAPIST

## 2022-09-06 PROCEDURE — 97112 NEUROMUSCULAR REEDUCATION: CPT | Performed by: PHYSICAL THERAPIST

## 2022-09-06 PROCEDURE — 97140 MANUAL THERAPY 1/> REGIONS: CPT | Performed by: PHYSICAL THERAPIST

## 2022-09-06 NOTE — PROGRESS NOTES
Daily Note     Today's date: 2022  Patient name: Salbador Daily  : 1985  MRN: [id number]  Referring provider: Ac Granda MD  Dx:   Encounter Diagnosis     ICD-10-CM    1  Left knee dislocation, initial encounter  S83 105A    2  S/P ACL repair  [id number]                   Subjective: pt reports after last session he noted increased soreness and did not do much until yesterday  He did push it yesterday and believes that he can move it better and more  Objective: See treatment diary below      Assessment: Tolerated treatment well  Patient demonstrated fatigue post treatment and exhibited good technique with therapeutic exercises, he was able to achieve full rotation around the bike today compared to last visit and did not have as much discomfort and pressure in the knee since last visit  He did achieve 95 degrees today and will be able to continue to progress as tolerated  Plan: Continue per plan of care        Precautions:     Daily Treatment Diary  Date    Visit Number 20 21 22  19   FOTO     XX   Re-Eval           Manuals    Knee flexion PROM  [facility]   PK   Patellar mobility  [facility]   PK   Knee flexion mobilization [facility]     STM quad        Neuro Re-Ed     Chair scooting 2 laps  2 laps      Hamstring curl    20 x standing  30x prone   Biodex weight shifting        squat 20x  20x 20x  30x   (L) SLS        Tandem Stance        Hamstring stretch   30"x3 standing  30" x 3   Ther Ex    Bike  8' w/ MHP 8' 8' MHP Pre 5' post  5'   Quad stretch    20"x3 w/ manual     LAQ 4# 2x10 4# 2x10      DL LP        (L) SL LP F 6 41# 30x F 6 45# 30x 57# 3x10      SLR x3        Knee flexion stretch on step   2R 20"x4      Prolong hang on wall        Gastroc stretch Slant both 20"x3  Slant both 20"x3  Slant both 20"x3   Slant both 20"x3    Step down 2R 10x 2R 10x 2R 10x      Knee extension elvi        PT EDU     PK   Ther Activity    Step up 3R 10x  3R 10x Modalities    CP post

## 2022-09-07 ENCOUNTER — EVALUATION (OUTPATIENT)
Dept: PHYSICAL THERAPY | Facility: CLINIC | Age: 37
End: 2022-09-07
Payer: COMMERCIAL

## 2022-09-07 DIAGNOSIS — Z98.890 S/P ACL REPAIR: ICD-10-CM

## 2022-09-07 DIAGNOSIS — S83.105A LEFT KNEE DISLOCATION, INITIAL ENCOUNTER: Primary | ICD-10-CM

## 2022-09-07 PROCEDURE — 97112 NEUROMUSCULAR REEDUCATION: CPT | Performed by: PHYSICAL THERAPIST

## 2022-09-07 PROCEDURE — 97164 PT RE-EVAL EST PLAN CARE: CPT | Performed by: PHYSICAL THERAPIST

## 2022-09-07 PROCEDURE — 97110 THERAPEUTIC EXERCISES: CPT | Performed by: PHYSICAL THERAPIST

## 2022-09-07 NOTE — PROGRESS NOTES
PT Evaluation     Today's date: 2022  Patient name: Walt Aguayo  : 1985  MRN: [id number]  Referring provider: Rachele Robles MD  Dx:   Encounter Diagnosis     ICD-10-CM    1  Left knee dislocation, initial encounter  S83 105A    2  S/P ACL repair  [id number]                   Assessment  Assessment details: Walt Aguayo has demonstrated overall improvement in ROM, strength, function, and a reduction in pain  Currently, he has made steady progress towards his goals especially after manipulation, but continues to remain limited with knee flexion greater than 90 degrees consistently  He is recovering from multiple serious surgeries and is demonstrating stiffness each morning, but is showing consistent progress  At this time, skilled physical therapy is warranted to address the remaining impairments and aide in return to full functional desires and higher level activities such as running and kayaking  Limiting factors to therapy comorbidites and he demonstrates fantastic motivation  Impairments: abnormal gait, abnormal or restricted ROM, abnormal movement, activity intolerance, impaired balance, impaired physical strength, pain with function and weight-bearing intolerance    Symptom irritability: moderateUnderstanding of Dx/Px/POC: good   Prognosis: good    Goals  STGs  1  Decrease pain by 20% in 2-4 weeks  MET  2  Improve knee ROM by 60 degrees in 2-4 weeks  MET   3  Improve knee strength by 1/3 grade in 2-4 weeks  MET  4  Improve knee strength to 55# in 2-4 weeks   5  Improve SLS to 20 seconds in 2-4 weeks    LTG  1  Initiate plyometric interventions in 6-8 weeks without issues  2  Achieve similar strength per dynamometer reading in 6-8 weeks  3  Improve gait to normal knee flexion in 6-8 weeks             Plan  Patient would benefit from: skilled physical therapy  Referral necessary: No  Planned modality interventions: cryotherapy, TENS and thermotherapy: hydrocollator packs  Planned therapy interventions: manual therapy, therapeutic training, stretching, strengthening, therapeutic activities, therapeutic exercise, patient education, activity modification and neuromuscular re-education  Frequency: 2x week  Duration in weeks: 8  Treatment plan discussed with: patient        Subjective Evaluation    History of Present Illness  Date of surgery: 2022  Mechanism of injury: surgery  Mechanism of injury: Pt reports since manipulation he has noticed improvement in his tolerance to interventions, motion and functional capacity of negotiating stairs  He has been consistently using the dynasplint at home and bending the knee to work on his interventions  He knows he is progressing as tolerated, but continues to have most of his pain in the middle of the night and needs to take his meds for relief       Neurological signs: numbness surrounding knee  Red Flags: none          Not a recurrent problem   Quality of life: good    Pain  Current pain ratin  At best pain ratin  At worst pain ratin (night)  Quality: tight and sharp  Relieving factors: medications  Aggravating factors: walking and standing  Progression: improved    Social Support    Employment status: not working  Patient Goals  Patient goals for therapy: increased strength, decreased pain, increased motion, independence with ADLs/IADLs, return to work, return to [employer] activities and improved balance (return to [employer], swimming, and general activites of life)          Objective     Active Range of Motion   Left Knee   Flexion: 86 degrees   Extension: -3 degrees     Right Knee   Normal active range of motion  Flexion: 130 degrees   Extension: 0 degrees     Passive Range of Motion   Left Knee   Flexion: 98 degrees     Additional Passive Range of Motion Details  Patellar mobility still limited, but improved     Strength/Myotome Testing     Left Knee   Flexion: 4  Extension: 4    Right Knee   Normal strength    Additional Strength Details  Full weight bearing  Dynamometer reading   LLE hamstrin quad: 65  RLE hamstrin quad: 45     Tests     Additional Tests Details  Gait: decreased knee flexion during swing              Precautions:     Daily Treatment Diary  Date     Visit Number 20 21 22 23    FOTO        Re-Eval           Manuals    Knee flexion PROM  [address]  [address]  [address]    Patellar mobility  [address]      Knee flexion mobilization  [address]  [address]     STM quad        Neuro Re-Ed     Chair scooting 2 laps  2 laps      Hamstring curl    20 x standing     Dead lift DL    18# 2x10    squat 20x  20x 20x 8# 2x10    (L) SLS    NV    Side steps    2x10 GTB     Hamstring stretch   30"x3 standing     Ther Ex    Bike  8' w/ MHP 8' 8' MHP Pre 5' post 8' MHP    Quad stretch    20"x3 w/ manual     LAQ 4# 2x10 4# 2x10      DL LP        (L) SL LP F 6 41# 30x F 6 45# 30x 57# 3x10  59# 3x10    lunge    2x10 L in front    Knee flexion stretch on step   2R 20"x4      Prolong hang on wall        Gastroc stretch Slant both 20"x3  Slant both 20"x3  Slant both 20"x3  Slant both 20"x3     Step down 2R 10x 2R 10x 2R 10x  2R 10x     Knee extension elvi        PT EDU        Ther Activity    Step up 3R 10x  3R 10x                              Modalities    CP post

## 2022-09-08 ENCOUNTER — OFFICE VISIT (OUTPATIENT)
Dept: PHYSICAL THERAPY | Facility: CLINIC | Age: 37
End: 2022-09-08
Payer: COMMERCIAL

## 2022-09-08 DIAGNOSIS — S83.105A LEFT KNEE DISLOCATION, INITIAL ENCOUNTER: Primary | ICD-10-CM

## 2022-09-08 DIAGNOSIS — Z98.890 S/P ACL REPAIR: ICD-10-CM

## 2022-09-08 PROCEDURE — 97140 MANUAL THERAPY 1/> REGIONS: CPT | Performed by: PHYSICAL THERAPIST

## 2022-09-08 PROCEDURE — 97110 THERAPEUTIC EXERCISES: CPT | Performed by: PHYSICAL THERAPIST

## 2022-09-08 NOTE — PROGRESS NOTES
Daily Note     Today's date: 2022  Patient name: Jaymie Barfield  : 1985  MRN: [id number]  Referring provider: Mariah Carrillo MD  Dx:   Encounter Diagnosis     ICD-10-CM    1  Left knee dislocation, initial encounter  S83 105A    2  S/P ACL repair  [id number]                   Subjective: pt reports continued tightness to start the session  However did note that he is getting better each day  He is happy with his progress and believes that he can do more  He reports that he will also be starting work on Monday as a   Objective: See treatment diary below      Assessment: Tolerated treatment well  Patient demonstrated fatigue post treatment and exhibited good technique with therapeutic exercises, he is currently continuing to do well with achieving 95 degrees of AROM and >100 of PROM  Will continue to progress as tolerated  Plan: Continue per plan of care        Precautions:     Daily Treatment Diary  Date    Visit Number 20 21 22 23 24   FOTO     NV   Re-Eval           Manuals    Knee flexion PROM   [address]  [address]    Patellar mobility   [address]      Knee flexion mobilization  [address]  [address]    STM quad        Neuro Re-Ed     Chair scooting 2 laps  2 laps      Hamstring curl    20 x standing     Dead lift DL    18# 2x10    squat 20x  20x 20x 8# 2x10 2x10    (L) SLS    NV    Side steps    2x10 GTB     Hamstring stretch   30"x3 standing     Ther Ex    Bike  8' w/ MHP 8' 8' MHP Pre 5' post 8' MHP 8' MHP    Quad stretch    20"x3 w/ manual  20"x3 and manual    LAQ 4# 2x10 4# 2x10      DL LP        (L) SL LP F 6 41# 30x F 6 45# 30x 57# 3x10  59# 3x10 61# 3x10    lunge    2x10 L in front    Knee flexion stretch on step   2R 20"x4      Prolong hang on wall        Gastroc stretch Slant both 20"x3  Slant both 20"x3  Slant both 20"x3  Slant both 20"x3  Slant both 20"x3    Step down 2R 10x 2R 10x 2R 10x  2R 10x     Knee extension elvi        PT EDU        Ther Activity    Step up 3R 10x  3R 10x                              Modalities    CP post

## 2022-09-09 ENCOUNTER — OFFICE VISIT (OUTPATIENT)
Dept: PHYSICAL THERAPY | Facility: CLINIC | Age: 37
End: 2022-09-09
Payer: COMMERCIAL

## 2022-09-09 DIAGNOSIS — Z98.890 S/P ACL REPAIR: ICD-10-CM

## 2022-09-09 DIAGNOSIS — S83.105A LEFT KNEE DISLOCATION, INITIAL ENCOUNTER: Primary | ICD-10-CM

## 2022-09-09 PROCEDURE — 97110 THERAPEUTIC EXERCISES: CPT | Performed by: PHYSICAL THERAPIST

## 2022-09-09 PROCEDURE — 97112 NEUROMUSCULAR REEDUCATION: CPT | Performed by: PHYSICAL THERAPIST

## 2022-09-09 PROCEDURE — 97140 MANUAL THERAPY 1/> REGIONS: CPT | Performed by: PHYSICAL THERAPIST

## 2022-09-09 NOTE — PROGRESS NOTES
Daily Note     Today's date: 2022  Patient name: Oneil Ortiz  : 1985  MRN: [id number]  Referring provider: Marcellus Robles MD  Dx:   Encounter Diagnosis     ICD-10-CM    1  Left knee dislocation, initial encounter  S83 105A    2  S/P ACL repair  [id number]                   Subjective: Pt reports at this time feeling an increase in pian in the posterior knee today with straightening it  He was able to do a lot after the appointment yesterday  Objective: See treatment diary below      Assessment: Tolerated treatment fair  Patient at this time notes that during full knee extension and terminal stance during ambulation he has more pain in the ankle mortise and the big toe due to his foot surgery  He is also experiencing during knee extension with weight bearing pain in the posterior aspect of the knee and calf  He is currently educated to continue with these stretches at home to improve his tolerance to ambulation especially before starting work on Monday  Plan: Continue per plan of care        Precautions:     Daily Treatment Diary  Date    Visit Number 25  22 23 24   FOTO     NV   Re-Eval           Manuals    Knee flexion PROM  [address]  [address] [facility]    Patellar mobility         Knee flexion mobilization   [facility] quad Calf MK        Neuro Re-Ed     Chair scooting        Hamstring curl    20 x standing     Dead lift DL    18# 2x10    squat 20x  20x 8# 2x10 2x10    (L) SLS    NV    Side steps    2x10 GTB     Hamstring stretch 20"x3 standing   30"x3 standing     Ther Ex    Bike  8 MHP   8' MHP Pre 5' post 8' MHP 8' MHP    Quad stretch    20"x3 w/ manual  20"x3 and manual    LAQ        DL LP        (L) SL LP 67# 2x10   57# 3x10  59# 3x10 61# 3x10    lunge    2x10 L in front    Knee flexion stretch on step   2R 20"x4      Prolong hang on wall        Gastroc stretch   Slant both 20"x3  Slant both 20"x3  Slant both 20"x3    Step down 2R 2x10   2R 10x  2R 10x     Knee extension elvi        PT EDU        Ther Activity    Step up                                Modalities    CP post        MHP on post knee 10'

## 2022-09-12 ENCOUNTER — OFFICE VISIT (OUTPATIENT)
Dept: PHYSICAL THERAPY | Facility: CLINIC | Age: 37
End: 2022-09-12
Payer: COMMERCIAL

## 2022-09-12 DIAGNOSIS — S93.325A LISFRANC DISLOCATION, LEFT, INITIAL ENCOUNTER: ICD-10-CM

## 2022-09-12 DIAGNOSIS — S83.105A LEFT KNEE DISLOCATION, INITIAL ENCOUNTER: Primary | ICD-10-CM

## 2022-09-12 DIAGNOSIS — Z98.890 S/P ACL REPAIR: ICD-10-CM

## 2022-09-12 PROCEDURE — 97140 MANUAL THERAPY 1/> REGIONS: CPT

## 2022-09-12 PROCEDURE — 97110 THERAPEUTIC EXERCISES: CPT

## 2022-09-12 PROCEDURE — 97112 NEUROMUSCULAR REEDUCATION: CPT

## 2022-09-12 NOTE — PROGRESS NOTES
Daily Note     Today's date: 2022  Patient name: Haroldo Metz  : 1985  MRN: [id number]  Referring provider: Agapito Daley MD  Dx:   Encounter Diagnosis     ICD-10-CM    1  Left knee dislocation, initial encounter  S83 105A    2  S/P ACL repair  [id number]    3  Lisfranc dislocation, left, initial encounter  S93 325A                   Subjective: Pt presents to PT reporting he is had his first day of training at a new job and stated there was a lot of walking  He states his knee feels good today  He does state he feels stronger every day  Objective: See treatment diary below      Assessment: Tolerated treatment well  He does reports pain at proximal gastroc muscle secondary to tightness  Pt reports + response to manual therapy  Pt demonstrates improvement with heel taps noting less UE assist   Patient demonstrated fatigue post treatment, exhibited good technique with therapeutic exercises and would benefit from continued PT to increase flexibility, strength and function  Plan: Continue per plan of care        Precautions:     Daily Treatment Diary  Date    Visit Number 25 26   24   FOTO     NV   Re-Eval           Manuals    Knee flexion PROM  [address]   [address]    Patellar mobility         Knee flexion mobilization     MK    STM quad Calf MK  [address]      Neuro Re-Ed     Chair scooting        Hamstring curl         Dead lift DL        squat 20x 20x   2x10    (L) SLS        Side steps        Hamstring stretch 20"x3 standing  30"x3 standing       Ther Ex    Bike  8 MHP  10' MHP    8' MHP    Quad stretch      20"x3 and manual    LAQ        DL LP        (L) SL LP 67# 2x10  67# 2x10    61# 3x10    lunge        Knee flexion stretch on step        Prolong hang on wall        Gastroc stretch     Slant both 20"x3    Step down 2R 2x10  2R 2x10       Knee extension elvi        PT EDU        Ther Activity    Step up                                Modalities    CP post MHP on post knee 10'

## 2022-09-13 ENCOUNTER — APPOINTMENT (OUTPATIENT)
Dept: PHYSICAL THERAPY | Facility: CLINIC | Age: 37
End: 2022-09-13
Payer: COMMERCIAL

## 2022-09-14 ENCOUNTER — OFFICE VISIT (OUTPATIENT)
Dept: PHYSICAL THERAPY | Facility: CLINIC | Age: 37
End: 2022-09-14
Payer: COMMERCIAL

## 2022-09-14 DIAGNOSIS — Z98.890 S/P ACL REPAIR: ICD-10-CM

## 2022-09-14 DIAGNOSIS — S83.105A LEFT KNEE DISLOCATION, INITIAL ENCOUNTER: Primary | ICD-10-CM

## 2022-09-14 PROCEDURE — 97110 THERAPEUTIC EXERCISES: CPT | Performed by: PHYSICAL THERAPIST

## 2022-09-14 PROCEDURE — 97112 NEUROMUSCULAR REEDUCATION: CPT | Performed by: PHYSICAL THERAPIST

## 2022-09-14 NOTE — PROGRESS NOTES
Daily Note     Today's date: 2022  Patient name: Lula Isbell  : 1985  MRN: [id number]  Referring provider: Jaden Castellanos MD  Dx:   Encounter Diagnosis     ICD-10-CM    1  Left knee dislocation, initial encounter  S83 105A    2  S/P ACL repair  [id number]                   Subjective: Pt reports feeling tight today compared to last visit and has been ambulating more at work realizing he is having increased discomfort in the ankle  Objective: See treatment diary below      Assessment: Tolerated treatment well  Patient he was able to achieve previous levels of > 105 degrees today, but is still limited without stretching  He had to take a couple breaks due to cramping in the posterior aspect of his leg  Stretches did not seem to improve these symptoms as the cramps did come back  He was educated to trial eating or drinking more electrolytes at home and see if the symptoms improve  Plan: Continue per plan of care        Precautions:     Daily Treatment Diary  Date    Visit Number 25 26 27  24   FOTO     NV   Re-Eval           Manuals    Knee flexion PROM   [address]  [address]    Patellar mobility         Knee flexion mobilization    [address]  [address]    STM quad Calf  [address]  [address]      Neuro Re-Ed     Chair scooting        Hamstring curl         Dead lift DL        squat 20x 20x 20x on slant board  2x10    (L) SLS        Side steps        Hamstring stretch 20"x3 standing  30"x3 standing  30"x3      Ther Ex    Bike  8 MHP  10' MHP  10' MH P  8' MHP    Quad stretch      20"x3 and manual    LAQ        DL LP        (L) SL LP 67# 2x10  67# 2x10  67# 2x10   61# 3x10    lunge   2x10      Knee flexion stretch on step        Prolong hang on wall        Gastroc stretch   20'"x3   Slant both 20"x3    Step down 2R 2x10  2R 2x10       Knee extension elvi        PT EDU        Ther Activity    Step up                                Modalities    CP post        MHP on post knee 10'

## 2022-09-15 ENCOUNTER — OFFICE VISIT (OUTPATIENT)
Dept: PHYSICAL THERAPY | Facility: CLINIC | Age: 37
End: 2022-09-15
Payer: COMMERCIAL

## 2022-09-15 DIAGNOSIS — S83.105A LEFT KNEE DISLOCATION, INITIAL ENCOUNTER: Primary | ICD-10-CM

## 2022-09-15 DIAGNOSIS — Z98.890 S/P ACL REPAIR: ICD-10-CM

## 2022-09-15 PROCEDURE — 97110 THERAPEUTIC EXERCISES: CPT | Performed by: PHYSICAL THERAPIST

## 2022-09-15 PROCEDURE — 97010 HOT OR COLD PACKS THERAPY: CPT | Performed by: PHYSICAL THERAPIST

## 2022-09-15 PROCEDURE — 97140 MANUAL THERAPY 1/> REGIONS: CPT | Performed by: PHYSICAL THERAPIST

## 2022-09-15 NOTE — PROGRESS NOTES
Daily Note     Today's date: 9/15/2022  Patient name: Delano Dubin  : 1985  MRN: [id number]  Referring provider: Scout Brand MD  Dx:   Encounter Diagnosis     ICD-10-CM    1  Left knee dislocation, initial encounter  S83 105A    2  S/P ACL repair  [id number]                   Subjective: pt reports having a long day at work and having continued cramping in the knee compared to last time  He does feel relief with STM, but can not complete that all day long  Objective: See treatment diary below      Assessment: Tolerated treatment Fair  Patient was unable to tolerate all interventions today due to the increase in pain and cramping  He states that the symptoms are becoming more consistent and it seems to be limiting here in the clinic  He should continue with these interventions at home and trial more use of CP to decrease the cramping  Plan: Continue per plan of care        Precautions:     Daily Treatment Diary  Date 9/9 9/12 9/14 9/15    Visit Number 25 26 27 28    FOTO   xx     Re-Eval           Manuals    Knee flexion PROM   [facility] [address]  [facility] [address] MK pain     Patellar mobility         Knee flexion mobilization    [facility] MK pain     STM quad Calf  [facility]  PK [address]  [facility] [address]      Neuro Re-Ed     Chair scooting        Hamstring curl         Dead lift DL        squat 20x 20x 20x on slant board     (L) SLS        Side steps        Hamstring stretch 20"x3 standing  30"x3 standing  30"x3  20"x3     Ther Ex    Bike  8 MHP  10' MHP  10' MH P 15 MHP     Quad stretch         LAQ        DL LP        (L) SL LP 67# 2x10  67# 2x10  67# 2x10      lunge   2x10      Knee flexion stretch on step        Prolong hang on wall        Gastroc stretch   20'"x3  30"x4     Step down 2R 2x10  2R 2x10       Knee extension elvi        PT EDU    MK    Ther Activity    Step up                                Modalities    CP post    10'    MHP on post knee 10'

## 2022-09-19 ENCOUNTER — OFFICE VISIT (OUTPATIENT)
Dept: OBGYN CLINIC | Facility: MEDICAL CENTER | Age: 37
End: 2022-09-19

## 2022-09-19 VITALS
HEART RATE: 77 BPM | SYSTOLIC BLOOD PRESSURE: 116 MMHG | DIASTOLIC BLOOD PRESSURE: 65 MMHG | BODY MASS INDEX: 18.56 KG/M2 | WEIGHT: 137 LBS | HEIGHT: 72 IN

## 2022-09-19 DIAGNOSIS — Z98.890 STATUS POST ARTHROSCOPIC SURGERY OF LEFT KNEE: Primary | ICD-10-CM

## 2022-09-19 PROCEDURE — 99024 POSTOP FOLLOW-UP VISIT: CPT | Performed by: PHYSICIAN ASSISTANT
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
## 2022-09-20 ENCOUNTER — APPOINTMENT (OUTPATIENT)
Dept: PHYSICAL THERAPY | Facility: CLINIC | Age: 37
End: 2022-09-20
Payer: COMMERCIAL

## 2022-09-26 ENCOUNTER — OFFICE VISIT (OUTPATIENT)
Dept: PHYSICAL THERAPY | Facility: CLINIC | Age: 37
End: 2022-09-26
Payer: COMMERCIAL

## 2022-09-26 DIAGNOSIS — Z98.890 S/P ACL REPAIR: ICD-10-CM

## 2022-09-26 DIAGNOSIS — S83.105A LEFT KNEE DISLOCATION, INITIAL ENCOUNTER: Primary | ICD-10-CM

## 2022-09-26 PROCEDURE — 97112 NEUROMUSCULAR REEDUCATION: CPT | Performed by: PHYSICAL THERAPIST

## 2022-09-26 PROCEDURE — 97110 THERAPEUTIC EXERCISES: CPT | Performed by: PHYSICAL THERAPIST

## 2022-09-26 NOTE — PROGRESS NOTES
Daily Note     Today's date: 2022  Patient name: Narcisa Moore  : 1985  MRN: [id number]  Referring provider: Yani Ramos MD  Dx:   Encounter Diagnosis     ICD-10-CM    1  Left knee dislocation, initial encounter  S83 105A    2  S/P ACL repair  [id number]                   Subjective: Pt reports that he is continuing to notice improvement in his knee and feels that he is not as limited as he used to be  However, at work his biggest limitation now is tolerance to ambulation for long periods of time due to ankle pain  Objective: See treatment diary below      Assessment: Tolerated treatment well  Patient at this time demonstrated good single leg tolerance to squatting, but did demonstrate fair balance during single leg balance  He was educated that this is an important part about returning to higher levels of function without loss of balance  He is mostly limited at home with ankle mobility  Plan: Continue per plan of care        Precautions:     Daily Treatment Diary  Date 9/9 9/12 9/14 9/15 9/26   Visit Number [phone]   FOTO   xx  In 2   Re-Eval           Manuals    Knee flexion PROM   [address] pain     Patellar mobility         Knee flexion mobilization    [address] MK pain     STM quad Calf  [address]  PK c [address]      Neuro Re-Ed                     Dead lift DL        squat 20x 20x 20x on slant board     (L) SLS     10"x5   Side steps, Monster/BW     4 laps BTB   Hamstring stretch 20"x3 standing  30"x3 standing  30"x3  20"x3  20"x3 on 20"    Ther Ex    Bike  8 MHP  10' MHP  10' MH P 15 MHP  15 w/ MHP    (L) SL LP 67# 2x10  67# 2x10  67# 2x10   67#2x10   lunge   2x10   25x   bosu squat     NV   Tandem walk     NV   Gastroc stretch   20'"x3  30"x4  30"x3   Step down 2R 2x10  2R 2x10               PT EDU    MK    Ther Activity    Step up     20" 10x                            Modalities    CP post    10'    MHP on post knee 10'
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
## 2022-10-11 PROBLEM — T07.XXXA MULTIPLE LACERATIONS: Status: RESOLVED | Noted: 2022-04-24 | Resolved: 2022-10-11

## 2023-05-12 ENCOUNTER — OFFICE VISIT (OUTPATIENT)
Dept: FAMILY MEDICINE CLINIC | Facility: CLINIC | Age: 38
End: 2023-05-12

## 2023-05-12 VITALS
TEMPERATURE: 98.6 F | HEIGHT: 73 IN | HEART RATE: 93 BPM | RESPIRATION RATE: 16 BRPM | BODY MASS INDEX: 21.34 KG/M2 | SYSTOLIC BLOOD PRESSURE: 130 MMHG | WEIGHT: 161 LBS | OXYGEN SATURATION: 98 % | DIASTOLIC BLOOD PRESSURE: 70 MMHG

## 2023-05-12 DIAGNOSIS — J34.89 SINUS PRESSURE: ICD-10-CM

## 2023-05-12 DIAGNOSIS — R09.81 NASAL CONGESTION: Primary | ICD-10-CM

## 2023-05-12 RX ORDER — FEXOFENADINE HCL 180 MG/1
180 TABLET ORAL DAILY
Qty: 30 TABLET | Refills: 0 | Status: SHIPPED | OUTPATIENT
Start: 2023-05-12

## 2023-05-12 RX ORDER — FLUTICASONE PROPIONATE 50 MCG
1 SPRAY, SUSPENSION (ML) NASAL DAILY
Qty: 16 G | Refills: 1 | Status: SHIPPED | OUTPATIENT
Start: 2023-05-12

## 2023-05-12 NOTE — PROGRESS NOTES
Name: Sarahy García      : 1985      MRN: [id number]  Encounter Provider: Aren Gonzalez MD  Encounter Date: 2023   Encounter department: [address] [place] [address]     1  Nasal congestion  Assessment & Plan:  · Nasal mucosa is swollen on exam, in addition to erythematous throat  · Symptoms likely related to environmental allergies, likely worsened by prolonged use of afrin (rebound congestion)  · Will trial patient on Flonase once daily, in addition to [address] spray as needed during the day  He was also educated on the use of sameer pot, which can also help  · Will switch Claritin to Allegra daily  · Stop Afrin  · Return if worsening of symptoms, such as yellow nasal secretions, fevers, worsening headache  Orders:  -     fluticasone (FLONASE) 50 mcg/act nasal spray; 1 spray into each nostril daily  -     sodium chloride (OCEAN) 0 65 % nasal spray; 1 spray into each nostril as needed for congestion  -     fexofenadine (ALLEGRA) 180 MG tablet; Take 1 tablet (180 mg total) by mouth daily    2  Sinus pressure  Assessment & Plan:  · Likely caused by nasal congestion secondary to allergies  · No fevers or purulent nasal secretion  · Patient has pressure sensation on examination of sinus  · Start treatment for nasal congestion (see above)  · Use OTC Tylenol or Ibuprofen for headache  · Return if develops fever, worsening headache, purulent secretions           Subjective      Lucita Moritz is a 26-year-old male who presents today due to sinus pressure and nasal congestion  Patient reports that nasal congestion started two weeks ago due to environmental allergies, for which he was using claritin, however has noted that congestion has been getting worse, now associated with sinus pressure and headaches  He also started to present occasional episodes of cough   He denies fevers, purulent nasal secretion, visual disturbances, nausea "or vomiting  He has been using Afrin for the past 5 days, which helped with congestion in the beginning, Not using OTC pain medications for pain  Review of Systems   Constitutional: Negative for chills and fever  HENT: Positive for congestion and sinus pressure  Negative for ear pain and sore throat  Eyes: Negative for pain and visual disturbance  Respiratory: Positive for cough  Negative for shortness of breath  Cardiovascular: Negative for chest pain and palpitations  Gastrointestinal: Negative for abdominal pain and vomiting  Genitourinary: Negative for dysuria and hematuria  Musculoskeletal: Negative for arthralgias and back pain  Skin: Negative for color change and rash  Neurological: Negative for seizures and syncope  All other systems reviewed and are negative  Current Outpatient Medications on File Prior to Visit   Medication Sig   • acetaminophen (TYLENOL) 325 mg tablet Take 3 tablets (975 mg total) by mouth every 8 (eight) hours   • naproxen (NAPROSYN) 500 mg tablet Take 1 tablet (500 mg total) by mouth 2 (two) times a day with meals   • ondansetron (ZOFRAN-ODT) 4 mg disintegrating tablet Take 1 tablet (4 mg total) by mouth every 8 (eight) hours as needed for nausea or vomiting (Patient not taking: No sig reported)       Objective     /70 (BP Location: Right arm, Patient Position: Sitting, Cuff Size: Standard)   Pulse 93   Temp 98 6 °F (37 °C) (Temporal)   Resp 16   Ht 6' 1\" (1 854 m)   Wt 73 kg (161 lb)   SpO2 98%   BMI 21 24 kg/m²     Physical Exam  Vitals reviewed  Constitutional:       General: He is not in acute distress  Appearance: Normal appearance  He is normal weight  He is not ill-appearing, toxic-appearing or diaphoretic  HENT:      Head: Normocephalic and atraumatic  No right periorbital erythema or left periorbital erythema        Right Ear: Tympanic membrane normal       Left Ear: Tympanic membrane normal       Nose: Mucosal edema, congestion " and rhinorrhea present  Comments: Sinus pressure (maxillary, b/l)     Mouth/Throat:      Mouth: Mucous membranes are moist       Pharynx: Posterior oropharyngeal erythema present  Cardiovascular:      Rate and Rhythm: Normal rate and regular rhythm  Heart sounds: Normal heart sounds  No murmur heard  Pulmonary:      Effort: Pulmonary effort is normal  No respiratory distress  Breath sounds: Normal breath sounds  No wheezing  Musculoskeletal:         General: Normal range of motion  Cervical back: No rigidity  Lymphadenopathy:      Cervical: No cervical adenopathy  Skin:     General: Skin is warm  Neurological:      General: No focal deficit present  Mental Status: He is alert and oriented to person, place, and time  Psychiatric:         Mood and Affect: Mood normal          Behavior: Behavior normal          Thought Content:  Thought content normal          Judgment: Judgment normal        Jaden Leggett MD

## 2023-05-12 NOTE — PATIENT INSTRUCTIONS
Rinitis alérgica   LO QUE NECESITA SABER:   La rinitis alérgica o fiebre del heno es la inflamación del interior de la [place]  La inflamación es kenna reacción a los alérgenos que se encuentran en el aire  Un alérgeno puede ser cualquier cosa que cause kenna reacción alérgica  Las alergias a diferentes tipos [address], césped, [employer] o moho frecuentemente causan la rinitis alérgica  Los Record[employer] del polvo, las cucarachas, el pelo de las mascotas o el moho del interior de las rivera también pueden causar rinitis alérgica  INSTRUCCIONES SOBRE EL [facility]ITAL[facility]IA:   Llame al 911 en mary de presentar lo siguiente:  Usted tiene [employer] pecho o falta de aire  Regrese a la charo de emergencias si:  Usted tiene dolor intenso  Usted expectora roseline  Comuníquese con jones médico si:  Tiene fiebre  Usted tiene dolor de oído o sinusitis, o dolor de [place]  Nenita síntomas empeoran, [address]  A usted Thad Tire de la nariz moco amarillo, verdoso, café o con [place]  Jones nariz le sangra o le duele por dentro  Usted tiene dificultad para dormir debido a nenita síntomas  Usted tiene preguntas o inquietudes acerca de jones condición o cuidado  Medicamentos:  Los [employer] a disminuir nenita síntomas y aclarar jones congestión nasal     [place] nenita medicamentos elijah se le haya indicado  Consulte con jones médico si usted doug que jones medicamento no le está ayudando o si presenta efectos secundarios  Infórmele si es alérgico a algún medicamento  Mantenga kenna lista actualizada de los [place], las vitaminas y los productos herbales que axel  Incluya los siguientes datos de los medicamentos: cantidad, frecuencia y motivo de administración  Traiga con usted la lista o los envases de las píldoras a nenita citas de seguimiento  Lleve la lista de los medicamentos con usted en mary de kenna emergencia      Cómo manejar la rinitis alérgica: La mejor forma de manejar la rinitis alérgica es evitar alérgenos que puedan provocar nenita síntomas  Cualquiera de los siguientes puede llegar a disminuir nenita síntomas:  Enjuáguese la nariz y los senos paranasales con kenna solución de agua con sal o use un espray nasal de agua salina  [place]ales ayudará a diluir la mucosidad en la nariz eliminando el polen y la suciedad  También ayudará a reducir la inflamación para que usted pueda respirar normalmente  Pregúntele a curry médico con cuánta frecuencia debe usted enjuagarse la nariz con el espray  Reduzca los ácaros del polvo  Genevive Sultana y toallas en [place] kenna vez por semana  Cubra nenita almohadas y colchones con fundas libres de alérgenos  Limite el número de [address] de ethan y muñecos blandos que tiene curry yonas  Lave periódicamente en [place] los juguetes de curry yonas  Use kenna aspiradora que tenga filtro de aire y aspire semanalmente  Si es posible, deshágase de alfombras y sera  Estas recogen el polvo y los ácaros del polvo  Reduzca el polen  Messedamm 28 y tiffanie cerradas en la casa y javy  Permanezca adentro cuando el conteo de polen esté muy elevado  Marleta Harriet curry aire acondicionado en reciclar y [employer] filtros de aire con frecuencia  Báñese y lávese el rogelio antes de dormir todas las noches para danilo el polen que haya acumulado en el rogelio  Reduzca la caspa animal  Si es posible, no tenga gatos, perros, pájaros u otras mascotas  Si ya tiene mascotas en el hogar, manténgalas lejos de los dormitorios y habitaciones alfombradas  Báñelos con frecuencia  Reduzca el moho  No pase tiempo en sótanos  Compre plantas artificiales en vez de plantas de verdad  Mantenga la humedad de curry hogar a menos de 45%  Asegúrese que no haya estanques y charcos en curry casa o patio  No fume  Evite estar con otras personas que fumen  Pida información a curry médico si usted actualmente fuma y necesita ayuda para dejar de fumar  Acuda a nenita consultas de control con curry médico según le indicaron   Es probable que usted tenga que fabiola un especialista en alergias frecuentemente y para controlar nenita síntomas  Anote nenita preguntas para que se acuerde de hacerlas milena nenita visitas  © Copyright [facility] 2022 Information is for End User's use only and may not be sold, redistributed or otherwise used for commercial purposes  All illustrations and images included in CareNotes® are the copyrighted property of A D A M , [facility]  or [address] [place] [address] es sólo para uso en educación  Curry intención no es darle un consejo médico sobre enfermedades o tratamientos  Colsulte con curry Jose Alberto Vadito farmacéutico antes de seguir cualquier régimen médico para saber si es seguro y efectivo para usted

## 2023-05-12 NOTE — ASSESSMENT & PLAN NOTE
Patient given water.      Gaston Paniagua RN  12/04/22 9986 · Nasal mucosa is swollen on exam, in addition to erythematous throat  · Symptoms likely related to environmental allergies, likely worsened by prolonged use of afrin (rebound congestion)  · Will trial patient on Flonase once daily, in addition to [address] spray as needed during the day  He was also educated on the use of sameer pot, which can also help  · Will switch Claritin to Allegra daily  · Stop Afrin  · Return if worsening of symptoms, such as yellow nasal secretions, fevers, worsening headache

## 2023-05-12 NOTE — ASSESSMENT & PLAN NOTE
· Likely caused by nasal congestion secondary to allergies  · No fevers or purulent nasal secretion  · Patient has pressure sensation on examination of sinus  · Start treatment for nasal congestion (see above)  · Use OTC Tylenol or Ibuprofen for headache  · Return if develops fever, worsening headache, purulent secretions
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
## 2023-10-26 ENCOUNTER — OFFICE VISIT (OUTPATIENT)
Dept: FAMILY MEDICINE CLINIC | Facility: CLINIC | Age: 38
End: 2023-10-26

## 2023-10-26 VITALS
BODY MASS INDEX: 21.2 KG/M2 | WEIGHT: 160 LBS | RESPIRATION RATE: 18 BRPM | DIASTOLIC BLOOD PRESSURE: 82 MMHG | HEIGHT: 73 IN | OXYGEN SATURATION: 98 % | HEART RATE: 76 BPM | TEMPERATURE: 98.6 F | SYSTOLIC BLOOD PRESSURE: 128 MMHG

## 2023-10-26 DIAGNOSIS — J30.89 SEASONAL ALLERGIC RHINITIS DUE TO OTHER ALLERGIC TRIGGER: Primary | ICD-10-CM

## 2023-10-26 PROBLEM — J30.9 ALLERGIC RHINITIS DUE TO ALLERGEN: Status: ACTIVE | Noted: 2023-10-26

## 2023-10-26 PROBLEM — J34.89 SINUS PRESSURE: Status: RESOLVED | Noted: 2023-05-12 | Resolved: 2023-10-26

## 2023-10-26 PROBLEM — R09.81 NASAL CONGESTION: Status: RESOLVED | Noted: 2023-05-12 | Resolved: 2023-10-26

## 2023-10-26 PROCEDURE — 99213 OFFICE O/P EST LOW 20 MIN: CPT

## 2023-10-26 RX ORDER — AZELASTINE 1 MG/ML
1 SPRAY, METERED NASAL 2 TIMES DAILY
Qty: 30 ML | Refills: 0 | Status: SHIPPED | OUTPATIENT
Start: 2023-10-26

## 2023-10-26 NOTE — ASSESSMENT & PLAN NOTE
- Educated pt on avoiding afrin given risk of rebound congestion. Continue allegra. - Try switching to astelin given that flonase did not work in the past. Recommend using saline nasal spray prior to astelin nasal spray. Discussed proper administration technique.

## 2024-01-25 ENCOUNTER — OFFICE VISIT (OUTPATIENT)
Dept: FAMILY MEDICINE CLINIC | Facility: CLINIC | Age: 39
End: 2024-01-25

## 2024-01-25 VITALS
WEIGHT: 165.5 LBS | HEART RATE: 76 BPM | RESPIRATION RATE: 18 BRPM | HEIGHT: 72 IN | DIASTOLIC BLOOD PRESSURE: 60 MMHG | SYSTOLIC BLOOD PRESSURE: 128 MMHG | TEMPERATURE: 97.8 F | BODY MASS INDEX: 22.42 KG/M2 | OXYGEN SATURATION: 98 %

## 2024-01-25 DIAGNOSIS — R09.81 CHRONIC NASAL CONGESTION: Primary | ICD-10-CM

## 2024-01-25 PROCEDURE — 99213 OFFICE O/P EST LOW 20 MIN: CPT | Performed by: PHYSICIAN ASSISTANT

## 2024-01-25 RX ORDER — FLUTICASONE PROPIONATE 50 MCG
1 SPRAY, SUSPENSION (ML) NASAL DAILY
Qty: 16 G | Refills: 2 | Status: SHIPPED | OUTPATIENT
Start: 2024-01-25

## 2024-01-25 RX ORDER — LEVOCETIRIZINE DIHYDROCHLORIDE 5 MG/1
5 TABLET, FILM COATED ORAL EVERY EVENING
Qty: 90 TABLET | Refills: 1 | Status: SHIPPED | OUTPATIENT
Start: 2024-01-25
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
## 2024-01-25 NOTE — ASSESSMENT & PLAN NOTE
- Patient has been using Afrin 2-3 times daily for the past few months.  He reports this is the only thing that helps.  Explained to patient he should stop using Afrin due to side effect of rebound congestion.  - Patient has tried Claritin, Zyrtec with little relief.  Will trial Xyzal 5 mg daily.  - Will prescribe Flonase nasal spray.  - Recommend referral to ENT for further evaluation and management.  Order placed today.

## 2024-01-25 NOTE — PATIENT INSTRUCTIONS
[facility]  [phone]       [facility]     [address] [address] [phone]

## 2024-03-12 ENCOUNTER — OFFICE VISIT (OUTPATIENT)
Dept: FAMILY MEDICINE CLINIC | Facility: CLINIC | Age: 39
End: 2024-03-12

## 2024-03-12 VITALS
SYSTOLIC BLOOD PRESSURE: 120 MMHG | TEMPERATURE: 98.6 F | RESPIRATION RATE: 18 BRPM | OXYGEN SATURATION: 99 % | HEART RATE: 54 BPM | HEIGHT: 72 IN | BODY MASS INDEX: 22.48 KG/M2 | DIASTOLIC BLOOD PRESSURE: 66 MMHG | WEIGHT: 166 LBS

## 2024-03-12 DIAGNOSIS — M54.50 ACUTE BILATERAL LOW BACK PAIN WITHOUT SCIATICA: Primary | ICD-10-CM

## 2024-03-12 DIAGNOSIS — Z23 ENCOUNTER FOR IMMUNIZATION: ICD-10-CM

## 2024-03-12 DIAGNOSIS — J30.2 SEASONAL ALLERGIC RHINITIS, UNSPECIFIED TRIGGER: ICD-10-CM

## 2024-03-12 PROCEDURE — 90471 IMMUNIZATION ADMIN: CPT

## 2024-03-12 PROCEDURE — 99213 OFFICE O/P EST LOW 20 MIN: CPT

## 2024-03-12 PROCEDURE — 90686 IIV4 VACC NO PRSV 0.5 ML IM: CPT

## 2024-03-12 RX ORDER — METHOCARBAMOL 500 MG/1
500 TABLET, FILM COATED ORAL 3 TIMES DAILY PRN
Qty: 30 TABLET | Refills: 0 | Status: SHIPPED | OUTPATIENT
Start: 2024-03-12

## 2024-03-12 RX ORDER — NAPROXEN 500 MG/1
500 TABLET ORAL 2 TIMES DAILY WITH MEALS
Qty: 28 TABLET | Refills: 0 | Status: SHIPPED | OUTPATIENT
Start: 2024-03-12 | End: 2024-03-26
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
## 2024-03-23 DIAGNOSIS — M54.50 ACUTE BILATERAL LOW BACK PAIN WITHOUT SCIATICA: ICD-10-CM

## 2024-03-25 RX ORDER — NAPROXEN 500 MG/1
TABLET ORAL
Qty: 28 TABLET | Refills: 0 | OUTPATIENT
Start: 2024-03-25

## 2024-09-26 ENCOUNTER — HOSPITAL ENCOUNTER (EMERGENCY)
Facility: HOSPITAL | Age: 39
Discharge: HOME | End: 2024-09-26
Attending: EMERGENCY MEDICINE
Payer: COMMERCIAL

## 2024-09-26 ENCOUNTER — APPOINTMENT (EMERGENCY)
Dept: RADIOLOGY | Facility: HOSPITAL | Age: 39
End: 2024-09-26
Payer: COMMERCIAL

## 2024-09-26 ENCOUNTER — APPOINTMENT (EMERGENCY)
Dept: CT IMAGING | Facility: HOSPITAL | Age: 39
End: 2024-09-26
Payer: COMMERCIAL

## 2024-09-26 VITALS
DIASTOLIC BLOOD PRESSURE: 81 MMHG | BODY MASS INDEX: 24.34 KG/M2 | WEIGHT: 179.45 LBS | HEART RATE: 65 BPM | OXYGEN SATURATION: 100 % | RESPIRATION RATE: 18 BRPM | TEMPERATURE: 98.2 F | SYSTOLIC BLOOD PRESSURE: 130 MMHG

## 2024-09-26 DIAGNOSIS — S16.1XXA STRAIN OF NECK MUSCLE, INITIAL ENCOUNTER: ICD-10-CM

## 2024-09-26 DIAGNOSIS — S50.12XA CONTUSION OF LEFT FOREARM, INITIAL ENCOUNTER: ICD-10-CM
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
## 2024-09-26 PROCEDURE — 73090 X-RAY EXAM OF FOREARM: CPT

## 2024-09-26 PROCEDURE — 90471 IMMUNIZATION ADMIN: CPT

## 2024-09-26 PROCEDURE — 71045 X-RAY EXAM CHEST 1 VIEW: CPT

## 2024-09-26 PROCEDURE — 99284 EMERGENCY DEPT VISIT MOD MDM: CPT

## 2024-09-26 PROCEDURE — 72125 CT NECK SPINE W/O DYE: CPT

## 2024-09-26 PROCEDURE — 90715 TDAP VACCINE 7 YRS/> IM: CPT | Performed by: EMERGENCY MEDICINE

## 2024-09-26 PROCEDURE — 99285 EMERGENCY DEPT VISIT HI MDM: CPT | Performed by: EMERGENCY MEDICINE

## 2024-09-26 RX ORDER — ACETAMINOPHEN 325 MG/1
650 TABLET ORAL ONCE
Status: COMPLETED | OUTPATIENT
Start: 2024-09-26 | End: 2024-09-26

## 2024-09-26 RX ADMIN — TETANUS TOXOID, REDUCED DIPHTHERIA TOXOID AND ACELLULAR PERTUSSIS VACCINE, ADSORBED 0.5 ML: 5; 2.5; 8; 8; 2.5 SUSPENSION INTRAMUSCULAR at 17:21

## 2024-09-26 RX ADMIN — ACETAMINOPHEN 650 MG: 325 TABLET ORAL at 17:20
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
## 2024-09-26 NOTE — Clinical Note
Denver Cho was seen and treated in our emergency department on 9/26/2024.                Diagnosis:     Denver  may return to work on return date.    He may return on this date: 09/30/2024         If you have any questions or concerns, please don't hesitate to call.      Rich Coleman MD    ______________________________           _______________          _______________  Hospital Representative                              Date                                Time

## 2024-12-13 ENCOUNTER — HOSPITAL ENCOUNTER (OUTPATIENT)
Dept: MRI IMAGING | Facility: HOSPITAL | Age: 39
Discharge: HOME | End: 2024-12-13
Payer: COMMERCIAL

## 2024-12-13 DIAGNOSIS — S93.601A SPRAIN OF RIGHT FOOT, INITIAL ENCOUNTER: ICD-10-CM

## 2024-12-13 PROCEDURE — 73718 MRI LOWER EXTREMITY W/O DYE: CPT

## 2025-05-06 ENCOUNTER — TELEPHONE (OUTPATIENT)
Dept: FAMILY MEDICINE CLINIC | Facility: CLINIC | Age: 40
End: 2025-05-06

## 2025-05-06 NOTE — TELEPHONE ENCOUNTER
Working Departed PCP Pursuit List:      first attempt to contact patient. left message to return my call on answering machine

## (undated) DEVICE — GAUZE SPONGES,16 PLY: Brand: CURITY

## (undated) DEVICE — NEEDLE 18 G X 1 1/2

## (undated) DEVICE — ASTOUND STANDARD SURGICAL GOWN, XL: Brand: CONVERTORS

## (undated) DEVICE — PADDING CAST 4 IN  COTTON STRL

## (undated) DEVICE — GLOVE INDICATOR PI UNDERGLOVE SZ 8 BLUE

## (undated) DEVICE — ACE WRAP 6 IN UNSTERILE

## (undated) DEVICE — CUFF TOURNIQUET 30 X 4 IN QUICK CONNECT DISP 1BLA

## (undated) DEVICE — DRESSING MEPILEX AG BORDER 4 X 8 IN

## (undated) DEVICE — CHLORAPREP HI-LITE 26ML ORANGE

## (undated) DEVICE — PROBE ABLATION  APOLLO RF 90 DEG MULTI PORT

## (undated) DEVICE — SUT VICRYL 4-0 PS-2 27 IN J426H

## (undated) DEVICE — GLOVE SRG BIOGEL 7.5

## (undated) DEVICE — SUT STRATAFIX SPIRAL MONOCRYL PLUS 2-0 CT-1 30CM SXMP1B412

## (undated) DEVICE — NEEDLE HYPO 22G X 1-1/2 IN

## (undated) DEVICE — SUT 2 FIBERLOOP AR-7234

## (undated) DEVICE — SYRINGE 5ML LL

## (undated) DEVICE — STEINMANN PIN, SMOOTH
Type: IMPLANTABLE DEVICE | Site: FOOT | Status: NON-FUNCTIONAL
Brand: VARIAX
Removed: 2022-05-26

## (undated) DEVICE — DRILL BIT, AO DIA2.6MM X 135MM, SCALED: Brand: VARIAX

## (undated) DEVICE — CYSTO TUBING TUR Y IRRIGATION

## (undated) DEVICE — BLADE SHAVER DISSECTOR 4MM 13CM COOLCUT

## (undated) DEVICE — UNDYED BRAIDED (POLYGLACTIN 910), SYNTHETIC ABSORBABLE SUTURE: Brand: COATED VICRYL

## (undated) DEVICE — HOLDING PIN
Type: IMPLANTABLE DEVICE | Site: FOOT | Status: NON-FUNCTIONAL
Brand: ANCHORAGE
Removed: 2022-05-26

## (undated) DEVICE — OCCLUSIVE GAUZE STRIP,3% BISMUTH TRIBROMOPHENATE IN PETROLATUM BLEND: Brand: XEROFORM

## (undated) DEVICE — PENCIL ELECTROSURG E-Z CLEAN -0035H

## (undated) DEVICE — BLADE SHAVER DISSECTOR 5MM 13CM COOLCUT

## (undated) DEVICE — SUT FIBERSTICK #2 50IN BLUE

## (undated) DEVICE — GLOVE SRG BIOGEL 8

## (undated) DEVICE — INTENDED FOR TISSUE SEPARATION, AND OTHER PROCEDURES THAT REQUIRE A SHARP SURGICAL BLADE TO PUNCTURE OR CUT.: Brand: BARD-PARKER ® CARBON RIB-BACK BLADES

## (undated) DEVICE — ABDOMINAL PAD: Brand: DERMACEA

## (undated) DEVICE — IMPERVIOUS STOCKINETTE: Brand: DEROYAL

## (undated) DEVICE — TUBING EXTENSION 6 FT CONTIN WAVE

## (undated) DEVICE — FMS GRAVITY TUBING: Brand: FMS

## (undated) DEVICE — SUT ETHILON 3-0 PS-1 18 IN 1663G

## (undated) DEVICE — 4-PORT MANIFOLD: Brand: NEPTUNE 2

## (undated) DEVICE — CHLORAPREP HI-LITE 10.5ML ORANGE

## (undated) DEVICE — SUT VICRYL 2-0 CT-2 18 IN J726D

## (undated) DEVICE — HEAVY DUTY TABLE COVER: Brand: CONVERTORS

## (undated) DEVICE — DRAPE SHEET THREE QUARTER

## (undated) DEVICE — DRESSING XEROFORM 5 X 9

## (undated) DEVICE — SPONGE LAP 18 X 18 IN STRL RFD

## (undated) DEVICE — SUT VICRYL 2-0 CT-2 27 IN J269H

## (undated) DEVICE — DISPOSABLE EQUIPMENT COVER: Brand: SMALL TOWEL DRAPE

## (undated) DEVICE — 3M™ IOBAN™ 2 ANTIMICROBIAL INCISE DRAPE 6650EZ: Brand: IOBAN™ 2

## (undated) DEVICE — SPONGE SCRUB 4 PCT CHLORHEXIDINE

## (undated) DEVICE — MAYO STAND COVER: Brand: CONVERTORS

## (undated) DEVICE — PUDDLE VAC

## (undated) DEVICE — MEDI-VAC YANKAUER SUCTION HANDLE W/BULBOUS AND CONTROL VENT: Brand: CARDINAL HEALTH

## (undated) DEVICE — SCD SEQUENTIAL COMPRESSION COMFORT SLEEVE MEDIUM KNEE LENGTH: Brand: KENDALL SCD

## (undated) DEVICE — SYRINGE 20ML LL

## (undated) DEVICE — UNTHREADED GUIDE WIRE
Type: IMPLANTABLE DEVICE | Site: FOOT | Status: NON-FUNCTIONAL
Brand: FIXOS
Removed: 2022-05-26

## (undated) DEVICE — SUT TIGERSTICK TIGERWIRE 50IN WHITE/BLACK

## (undated) DEVICE — 3M™ STERI-DRAPE™ U-DRAPE 1015: Brand: STERI-DRAPE™

## (undated) DEVICE — BETHLEHEM UNIVERSAL  MIONR EXT: Brand: CARDINAL HEALTH

## (undated) DEVICE — ADHESIVE SKIN HIGH VISCOSITY EXOFIN 1ML

## (undated) DEVICE — PACK KNEE ARTHROSCOPY 62 PC

## (undated) DEVICE — GLOVE INDICATOR PI UNDERGLOVE SZ 8.5 BLUE

## (undated) DEVICE — BETHLEHEM UNIVERSAL  ARTHRO PK: Brand: CARDINAL HEALTH

## (undated) DEVICE — TIBURON SPLIT SHEET: Brand: CONVERTORS

## (undated) DEVICE — 3M™ STERI-STRIP™ REINFORCED ADHESIVE SKIN CLOSURES, R1547, 1/2 IN X 4 IN (12 MM X 100 MM), 6 STRIPS/ENVELOPE: Brand: 3M™ STERI-STRIP™

## (undated) DEVICE — 3M™ DURAPORE™ SURGICAL TAPE 1538-1, 1 INCH X 10 YARD (2,5CM X 9,1M), 12 ROLLS/BOX: Brand: 3M™ DURAPORE™

## (undated) DEVICE — PADDING CAST 6IN COTTON STRL

## (undated) DEVICE — SPLINT 5 X 30 IN FAST SET PLASTER

## (undated) DEVICE — COBAN 6 IN STERILE

## (undated) DEVICE — GLOVE INDICATOR PI UNDERGLOVE SZ 7 BLUE

## (undated) DEVICE — CANNULATED DRILL: Brand: FIXOS

## (undated) DEVICE — DRESSING MEPILEX AG BORDER 4 X 4 IN

## (undated) DEVICE — SUT MONOCRYL 2-0 SH 27 IN Y417H

## (undated) DEVICE — LIGHT GLOVE GREEN

## (undated) DEVICE — PAD CAST 6 IN COTTON NON STERILE

## (undated) DEVICE — CUTTER FLIPCUTTER III 6-12MM

## (undated) DEVICE — PLASTIC ADHESIVE BANDAGE: Brand: CURITY

## (undated) DEVICE — SUT TIGERLOOP #2 20IN AR-7234T